# Patient Record
Sex: MALE | Race: WHITE | NOT HISPANIC OR LATINO | Employment: FULL TIME | URBAN - METROPOLITAN AREA
[De-identification: names, ages, dates, MRNs, and addresses within clinical notes are randomized per-mention and may not be internally consistent; named-entity substitution may affect disease eponyms.]

---

## 2017-01-01 PROBLEM — K64.4 EXTERNAL HEMORRHOIDS: Status: ACTIVE | Noted: 2017-01-01

## 2017-01-01 PROBLEM — K57.30 DIVERTICULOSIS OF LARGE INTESTINE: Status: ACTIVE | Noted: 2017-01-01

## 2017-01-09 ENCOUNTER — OFFICE VISIT (OUTPATIENT)
Dept: SURGERY | Facility: CLINIC | Age: 37
End: 2017-01-09
Payer: COMMERCIAL

## 2017-01-09 ENCOUNTER — TELEPHONE (OUTPATIENT)
Dept: SURGERY | Facility: CLINIC | Age: 37
End: 2017-01-09

## 2017-01-09 VITALS — HEIGHT: 71 IN | WEIGHT: 315 LBS | BODY MASS INDEX: 44.1 KG/M2 | TEMPERATURE: 97.7 F

## 2017-01-09 DIAGNOSIS — E66.01 MORBID OBESITY WITH BMI OF 50.0-59.9, ADULT (H): ICD-10-CM

## 2017-01-09 DIAGNOSIS — K82.8 BILIARY DYSKINESIA: Primary | ICD-10-CM

## 2017-01-09 DIAGNOSIS — G89.18 POST-OP PAIN: ICD-10-CM

## 2017-01-09 PROCEDURE — 99243 OFF/OP CNSLTJ NEW/EST LOW 30: CPT | Performed by: SURGERY

## 2017-01-09 RX ORDER — CEFAZOLIN SODIUM 1 G/3ML
1 INJECTION, POWDER, FOR SOLUTION INTRAMUSCULAR; INTRAVENOUS SEE ADMIN INSTRUCTIONS
Status: CANCELLED | OUTPATIENT
Start: 2017-01-09

## 2017-01-09 RX ORDER — CEFAZOLIN SODIUM 1 G/50ML
3 SOLUTION INTRAVENOUS
Status: CANCELLED | OUTPATIENT
Start: 2017-01-09

## 2017-01-09 NOTE — MR AVS SNAPSHOT
After Visit Summary   1/9/2017    Frankie Gomez    MRN: [id number]           Patient Information     Date Of Birth          1980        Visit Information        Provider Department      1/9/2017 9:00 AM Maria Elena Reynoso MD [facility]        Care Instructions    You should stay on a low-fat diet prior to surgery. This means of avoiding all high fat foods.    You can eat things like cereal with low-fat milk. Avoid ice cream but you may have sherbet.  You can eat chicken and turkey without the skin. Extra lean hamburger is okay particular if it is rinsed after cooking. No high fat or marbled meats like ribeye.  You can have pasta with tomato sauce but no cheese or Gideon, etc.  Avoid peanut butter as this is oil based.  Salads are okay but you must use fat-free salad dressing.  Fruits and vegetables are great. Avoid dipping sauces.            Follow-ups after your visit        Who to contact     If you have questions or need follow up information about today's clinic visit or your schedule please contact [facility] directly at [phone].  Normal or non-critical lab and imaging results will be communicated to you by [employer], letter or phone within 4 business days after the clinic has received the results. If you do not hear from us within 7 days, please contact the clinic through [employer] or phone. If you have a critical or abnormal lab result, we will notify you by phone as soon as possible.  Submit refill requests through [employer] or call your pharmacy and they will forward the refill request to us. Please allow 3 business days for your refill to be completed.          Additional Information About Your Visit        MyChart Information     [employer] gives you secure access to your electronic health record. If you see a primary care provider, you can also send messages to your care team and make appointments. If you have questions, please call your primary  "care clinic.  If you do not have a primary care provider, please call [phone] and they will assist you.        Care EveryWhere ID     This is your Care EveryWhere ID. This could be used by other organizations to access your [facility] medical records  [id number]        Your Vitals Were     Temperature Height BMI (Body Mass Index)             97.7  F (36.5  C) (Skin) 5' 11\" (1.803 m) 53.58 kg/m2          Blood Pressure from Last 3 Encounters:   12/30/16 143/94   11/07/16 124/90   09/11/16 153/108    Weight from Last 3 Encounters:   01/09/17 384 lb (174.181 kg)   12/30/16 379 lb (171.913 kg)   11/16/16 375 lb (170.099 kg)              Today, you had the following     No orders found for display       Primary Care Provider Office Phone # Fax #    Erika Brian [phone]       [place] [id number]        Thank you!     Thank you for choosing [facility]  for your care. Our goal is always to provide you with excellent care. Hearing back from our patients is one way we can continue to improve our services. Please take a few minutes to complete the written survey that you may receive in the mail after your visit with us. Thank you!             Your Updated Medication List - Protect others around you: Learn how to safely use, store and throw away your medicines at www.disposemymeds.org.          This list is accurate as of: 1/9/17  9:15 AM.  Always use your most recent med list.                   Brand Name Dispense Instructions for use    IBUPROFEN PO      Take 600 mg by mouth daily       lisinopril-hydrochlorothiazide 10-12.5 MG per tablet    PRINZIDE/ZESTORETIC    90 tablet    Take 1 tablet by mouth daily       omeprazole 40 MG capsule    priLOSEC     Take 40 mg by mouth as needed       order for DME     1 Device    Auto CPAP @@ 5-15 cm with heated humidifier via nasal mask.       TYLENOL PO      Take 1,000 mg by mouth daily         "

## 2017-01-09 NOTE — NURSING NOTE
"Chief Complaint   Patient presents with     Consult     RUQ pain       Initial Temp(Src) 97.7  F (36.5  C) (Skin)  Ht 5' 11\" (1.803 m)  Wt 384 lb (174.181 kg)  BMI 53.58 kg/m2 Estimated body mass index is 53.58 kg/(m^2) as calculated from the following:    Height as of this encounter: 5' 11\" (1.803 m).    Weight as of this encounter: 384 lb (174.181 kg).  BP completed using cuff size: NA (Not Taken)  Norman DODGE CMA      "

## 2017-01-09 NOTE — PATIENT INSTRUCTIONS
You should stay on a low-fat diet prior to surgery. This means of avoiding all high fat foods.    You can eat things like cereal with low-fat milk. Avoid ice cream but you may have sherbet.  You can eat chicken and turkey without the skin. Extra lean hamburger is okay particular if it is rinsed after cooking. No high fat or marbled meats like ribeye.  You can have pasta with tomato sauce but no cheese or Gideon, etc.  Avoid peanut butter as this is oil based.  Salads are okay but you must use fat-free salad dressing.  Fruits and vegetables are great. Avoid dipping sauces.

## 2017-01-09 NOTE — PROGRESS NOTES
[facility] Surgery Consultation    Frankie Gomez MRN# [id number]   Age: 36 year old YOB: 1980     Date of consultation: 1/9/2017    Reason for consult: Biliary dyskinesia       Requesting physician: Erika Brian                       Chief Complaint:   Abdominal pain, right upper quadrant     History is obtained from the patient and review of the chart         History of Present Illness:   This patient is a 36 year old morbidly obese male with past medical history as noted below, who presents with complaints of right upper quadrant abdominal pain after eating fatty foods such as pizza.Patient was noted have distended gallbladder on ultrasound. CT showed no other cause for his pain. Colonoscopy and EGD did not show etiology of his pain. He complains of early satiety with fatty food. He complains of right upper quadrant abdominal pain with fatty food. HIDA scan was abnormal with diminished ejection fraction. As best as he can recall, he says he CCK injection did cause mild symptoms.              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     Former smoker              Past Surgical History:     Past Surgical History   Procedure Laterality Date     Hc colonoscopy w snare removal tumor/polyp/lesion  1998     Appendectomy  07/25/09     Excise mass foot Right 2/13/2015     Procedure: EXCISE MASS FOOT;  Surgeon: Ramo Decker DPM;  Location: [facility]     Colonoscopy N/A 12/30/2016     Procedure: COMBINED  COLONOSCOPY, SINGLE OR MULTIPLE BIOPSY/POLYPECTOMY BY BIOPSY;  Surgeon: Rikki Jenkins MD;  Location: [facility] GI     Esophagoscopy, gastroscopy, duodenoscopy (egd), combined N/A 12/30/2016     Procedure: COMBINED ESOPHAGOSCOPY, GASTROSCOPY, DUODENOSCOPY (EGD), BIOPSY SINGLE OR MULTIPLE;  Surgeon: Rikki Jenkins MD;  Location:  GI             Social History:     Social History   Substance Use Topics     Smoking status: Former Smoker -- 0.75 packs/day for 13 years     Types: Cigarettes     Quit date: 04/15/2013     Smokeless tobacco: Never Used      Comment: using electric cigarette     Alcohol Use: Yes      Comment: 3-4 times/year             Family History:     Family History   Problem Relation Age of Onset     CANCER Paternal Grandmother      Connective Tissue Disorder Mother      sjogrens     Depression Mother      on meds     Neurologic Disorder Mother      restless leg, migraines     Thyroid Disease Mother      C.A.D. Mother      Obesity Mother      Az-en-Y, snores     Obesity Father      snores     Obesity Paternal Grandfather              Immunizations:     VACCINE/DOSE   Diptheria   DPT   DTAP   HBIG   Hepatitis A   Hepatitis B   HIB   Influenza   Measles   Meningococcal   MMR   Mumps   Pneumococcal   Polio   Rubella   Small Pox   TDAP   Varicella   Zoster             Allergies:     Allergies   Allergen Reactions     No Known Allergies              Medications:     Current Outpatient Prescriptions   Medication Sig     IBUPROFEN PO Take 600 mg by mouth daily     omeprazole (PRILOSEC) 40 MG capsule Take 40 mg by mouth as needed     order for DME Auto CPAP @@ 5-15 cm with heated humidifier via nasal mask.     lisinopril-hydrochlorothiazide (PRINZIDE,ZESTORETIC) 10-12.5 MG per tablet Take 1 tablet by mouth daily     [DISCONTINUED] hydrochlorothiazide 12.5 MG TABS Take 1 tablet (12.5 mg) by mouth daily     Acetaminophen (TYLENOL PO) Take 1,000 mg by mouth daily      No current facility-administered  "medications for this visit.             Review of Systems:   C: NEGATIVE for fever, chills, change in weight  E/M: NEGATIVE for ear, mouth and throat problems  R: NEGATIVE for significant cough or SOB  CV: NEGATIVE for chest pain, palpitations or peripheral edema  GI: positive for nausea abdominal pain and loose stool. Worsened by fatty food.          Physical Exam:   All vitals have been reviewed  Temp(Src) 97.7  F (36.5  C) (Skin)  Ht 5' 11\" (1.803 m)  Wt 384 lb (174.181 kg)  BMI 53.58 kg/m2  Body mass index is 53.58 kg/(m^2).        General: Alert and oriented in no obvious distress  Eyes: No scleral icterus  Skin: Warm and dry without diaphoresis or jaundice  Respiratory: Unlabored  Abdomen: Obese, soft without tenderness in right upper quadrant at present. There is no rebound rigidity or guarding.  Musculoskeletal: Patient noted to move all extremities normally  Psychiatric: No obvious changes in mood or affect               Data:   All laboratory data reviewed  Results for orders placed or performed during the hospital encounter of 12/30/16   UPPER GI ENDOSCOPY   Result Value Ref Range    [place] [address] [phone]     Endoscopy Department  _______________________________________________________________________________  Patient Name: Frankie Gomez           Procedure Date: 12/30/2016 2:23 PM  MRN: [id number]                       Account Number: [id number]  YOB: 1980             Admit Type: Outpatient  Age: 36                               Room: Room 1  Gender: Male                          Note Status: Finalized  Attending MD: Rikki Jenkins MD    _______________________________________________________________________________     Procedure:           Upper GI endoscopy  Indications:         Epigastric abdominal pain, Diarrhea  Providers:           Rikki Jenkins MD  Referring MD:        Erika ZALDIVAR" MD Snehal, Troy Drummond MD  Medicines:           Midazolam 7 mg IV, Fentanyl 125 micrograms IV, Lidocaine                        gel  Complications:       No immediate complications.  _______ ________________________________________________________________________  Procedure:           Pre-Anesthesia Assessment:                       - Prior to the procedure, a History and Physical was                        performed, and patient medications, allergies and                        sensitivities were reviewed. The patient's tolerance of                        previous anesthesia was reviewed.                       - ASA Grade Assessment: II - A patient with mild                        systemic disease.                       After obtaining informed consent, the endoscope was                        passed under direct vision. Throughout the procedure,                        the patient's blood pressure, pulse, and oxygen                        saturations were monitored continuously. The Endoscope                        was introduced through the mouth, and advanced to the                        third part of duodenum.                                                                                    Findings:       The esophagus was normal.       The stomach was normal.       The examined duodenum was normal. Biopsies were taken with a cold        forceps for histology.                                                                                   Impression:          - Normal esophagus.                       - Normal stomach.                       - Normal examined duodenum.  Recommendation:      - Await pathology results.                       - Perform a colonoscopy today.                                                                                     ______________________  Rikki Jenkins MD  12/30/2016 2:45 PM  This report has been signed electronically.  Number of Addenda: 0    Note Initiated On:  12/30/2016 2:23 PM     COLONOSCOPY   Result Value Ref Range    COLONOSCOPY       [place] [address] [facility] [place] [address] [phone]     Endoscopy Department  _______________________________________________________________________________  Patient Name: Frankie Gomez           Procedure Date: 12/30/2016 2:45 PM  MRN: [id number]                       Account Number: [id number]  YOB: 1980             Admit Type: Outpatient  Age: 36                               Room: Room 1  Gender: Male                          Note Status: Finalized  Attending MD: Rikki Jenkins MD    _______________________________________________________________________________     Procedure:           Colonoscopy  Indications:         Abdominal pain, Diarrhea  Providers:           Rikki Jenkins MD  Referring MD:        Erika Brian MD, Troy Drummond MD  Medicines:           Midazolam 4 mg IV, Fentanyl 25 micrograms IV, See the                        other procedure note for documentation of the                        administered m edications  Complications:       No immediate complications.  _______________________________________________________________________________  Procedure:           Pre-Anesthesia Assessment:                       - Prior to the procedure, a History and Physical was                        performed, and patient medications, allergies and                        sensitivities were reviewed. The patient's tolerance of                        previous anesthesia was reviewed.                       - ASA Grade Assessment: II - A patient with mild                        systemic disease.                       After obtaining informed consent, the colonoscope was                        passed under direct vision. Throughout the procedure,                        the patient's blood pressure, pulse, and oxygen                        saturations were monitored  continuously. The Colonoscope                        was introduced through the anus and advanced to the                        terminal il eum. The quality of the bowel preparation was                        excellent.                                                                                   Findings:       The terminal ileum appeared normal.       A few small and large-mouthed diverticula were found in the sigmoid        colon. The colon was otherwise normal. Biopsies were taken with a cold        forceps for histology.       Medium-sized external hemorrhoids were noted.                                                                                   Impression:          - Diverticulosis in the sigmoid colon.                       - External hemorrhoids, medium.  Recommendation:      - Await pathology results.                       - Advise propofol for future sedated exams.                       - Patient has a contact number available for                        emergencies. The signs and symptoms of potential delayed                        complications were discussed with the patient. Return to                         normal activities tomorrow. Written discharge                        instructions were provided to the patient.                                                                                     ______________________  Sia Jenkins MD  12/30/2016 3:00 PM  This report has been signed electronically.  Number of Addenda: 0    Note Initiated On: 12/30/2016 2:45 PM     Surgical pathology exam   Result Value Ref Range    Copath Report       Patient Name: ROSEMARIE CARLSON  MR#: [id number]  Specimen #: [id number]  Collected: 12/30/2016  Received: 1/2/2017  Reported: 1/3/2017 15:26  Ordering Phy(s): SIA JENKINS    For improved result formatting, select 'View Enhanced Report Format'  under Linked Documents section.    SPECIMEN(S):  A: Duodenal biopsy  B: Colon biopsy, random    FINAL  "DIAGNOSIS:   And A.  Duodenum, mucosal biopsy:  - Normal small bowel mucosa.    B.  Colon, right mucosal biopsies:  - Normal colonic mucosa.    Electronically signed out by:    Eyal Araujo M.D.    CLINICAL HISTORY:  36-year-old male.  From electronic medical record:  Epigastric abdominal  pain, diarrhea.  On 12/30/60 upper GI endoscopy reported as normal  esophagus, normal stomach and normal examined and duodenum.    GROSS:  A. The specimen is received in formalin, labeled with the patient's name  and date of birth, and designated \"duodenal biopsies\". It consists of  six tan tissue fragments ranging from 0.1-0.2 cm in greatest dimension.   Entirely submitted in one cassette.    B. The specimen is received in formalin, labeled with the patient's name  and date of birth, and designated \"random colon biopsies\". It consists  of six tan tissue fragments ranging from 0.1-0.2 cm in greatest  dimension.  One specimen fragment is inked blue as an identification  marker.  Entirely submitted in one cassette. (Dictated by: Bridgett SINCLAIR 1/2/2017 09:11 AM)    MICROSCOPIC:  Microscopic examination is performed.    CPT Codes:  A: 00448-QC8  B: 75304-NS1    TESTING LAB LOCATION:  [address] [address] [phone]    COLLECTION SITE:  Client: [facility]  Location: [facility] (P)            ULTRASOUND ABDOMEN LIMITED RIGHT UPPER QUADRANT  9/12/2016 12:57 AM       HISTORY: Right upper quadrant abdominal pain.     COMPARISON: None.     FINDINGS: Markedly limited visualization of the liver due to large  body habitus. The gallbladder is mildly distended but otherwise  unremarkable without visualized gallstones, wall thickening or  pericholecystic fluid. No focal tenderness over the gallbladder. The  common duct is mildly dilated measuring 1.0 cm in diameter. The  intrahepatic bile ducts are not well-visualized. The right kidney " has  normal size and echogenicity measuring 11.4 cm in length. No right  intrarenal collecting system dilatation, calculi or masses. No free  fluid in the upper right hemiabdomen.                                                                       IMPRESSION:    1. The gallbladder is mildly distended, but is otherwise unremarkable  in appearance.  2. Mild dilatation of the common bile duct, measuring 1.0 cm in  diameter.  3. Markedly limited visualization of the liver due to large body  habitus.        CT scan dated 9/12/16  shows:    IMPRESSION:    1. No convincing cause of acute pain identified in the abdomen or  pelvis.  2. No renal or ureteral calculi or evidence of urinary obstruction.  3. A few colonic diverticula are present without evidence of  diverticulitis.  4. The gallbladder is mildly distended, but otherwise unremarkable.     NUCLEAR MEDICINE HEPATOBILIARY SCAN WITH GALLBLADDER EJECTION FRACTION  11/16/2016 2:05 PM       HISTORY: Right upper quadrant pain.     TECHNIQUE: 7.5 mCi technetium labeled mebrofenin given intravenously.  3.4 mcg CCK given intravenously at 60 minutes.     COMPARISON: None.     FINDINGS: There is prompt and homogeneous uptake of the  radiopharmaceutical by the liver. Intrahepatic and faint extrahepatic  bile duct visualization is seen by 15 minutes and small bowel is  identified by 20 minutes. The gallbladder begins to visualize at 30  minutes and fills progressively to 60 minutes. Computer-calculated  gallbladder ejection fraction is 11% which is abnormally low (normal  35-75%).                                                                       IMPRESSION:  1. No evidence for cystic duct or common duct obstruction.  2. Delayed visualization of the gallbladder which may indicate chronic  cholecystitis.  3. Abnormally low gallbladder ejection fraction at 11%.          Assessment and Plan:   Assessment:  biliary dyskinesia, morbid obesity     Patient Active Problem List     Diagnosis Date Noted     Diverticulosis of large intestine 01/01/2017     Priority: Medium     External hemorrhoids 01/01/2017     Priority: Medium     SVT (supraventricular tachycardia) (H) 05/27/2015     Priority: Medium     NASIR (obstructive sleep apnea) 12/15/2014     [place] 11/20/2014 AHI 5.2 RDI 11.8        Heartburn      Morbid obesity (H)      Hypertension      Hyperlipidemia      Diagnosis updated by automated process. Provider to review and confirm.       Asthma      [employer] utility       Fatty liver disease, nonalcoholic      Generalized anxiety disorder      Diagnosis updated by automated process. Provider to review and confirm.       Depression      Panic      Chronic low back pain      fall injury, fell through stairs-hit concrete       Lumbosacral neuritis 01/28/2013     CARDIOVASCULAR SCREENING; LDL GOAL LESS THAN 130 02/02/2011     Major depressive disorder, single episode, moderate (H) 02/02/2011     Displacement of intervertebral disc, site unspecified, without myelopathy 02/12/2007     Tobacco use disorder 11/21/2006     Pain in joint, forearm 06/25/2006     Degeneration of lumbar or lumbosacral intervertebral disc 08/10/2005           Plan:   The patient was thoroughly counseled regarding his biliary dyskinesia. Low-fat diet was recommended. Laparoscopic cholecystectomy was discussed.    The patient was informed that the proposed procedure or medical intervention involves removal of the gallbladder laparoscopically (with small incisions and camera) possibly open and does offer a very good likelihood of symptom relief.     The patient was made aware of the risks of the procedure, including but not limited to:    bleeding, conversion to open, bile leak, bile duct injury or other adjacent organ injury, retained gallstones, cardiac or pulmonary related complications and anesthesia complications. Also that difficulties may be encountered during recovery to include:  bile leak or retained stone  requiring ERCP.     In the course of the evaluation we did discuss other therapeutic options with the patient, including antibiotics and/or low fat diet. The risks and benefits of these options were also discussed which include but are not limited to: recurrent worsening episodes.     Also discussed were possible problems or difficulties the patient may encounter if treatment was not pursued at this time. These include: worsening episodes, cholangitis and/or pancreatitis.     The patient was informed that Maria Elena Reynoso MD will be primarily responsible for the procedure. Assistance during the procedure and during hospitalization may also be provided by other physicians, nurses and technicians.       The patient will be provided additional education resources by the support staff. If there are ever any questions regarding their diagnosis or the procedure, the patient is encouraged to ask.     All of the patient s  questions have been answered to his satisfaction and he has indicated a clear understanding of this discussion.   Frankie expressed understanding of risks, benefits and alternatives and wished to proceed.     All findings, test results, and diagnosis were discussed with the patient. Frankie  participated in the decision making process and agreed with the plan of care. Questions were sought and answered.                Attestation:  I have reviewed today's vital signs, notes, medications, labs and imaging.  Amount of time performed on this consult: 30 minutes of which greater than 50% was spent in counseling and coordination of care.    Maria Elena Reynoso MD       Please schedule for surgery, pre op H&P, and post ops.    Surgery:  Patient Name:  Frankie Gomez ([id number])  Procedure:   Laparoscopic cholecystectomy  Diagnosis:    Biliary dyskinesia  Morbid obesity   Assistant: first assist  Surgeon:  Maria Elena Reynoso MD  Anesthesia:  General  PT type:  Same Day Surgery  Time needed: 75  minutes  Patient position:  lying supine  Mini fluoro:  No  C-arm:  no  Equipment:  routine  Anticoagulation:  No  Vendor:  no  Surgeon Notes: morbid obesity    Post op appts:    12-15 days po    Expected work restrictions:  10 pound lifting,pushing and pulling restriction for 3 weeks postop    [facility] Discussed:  NO

## 2017-01-09 NOTE — TELEPHONE ENCOUNTER
Surgery Scheduled    Date of Surgery 2/13 Time of Surgery 200pm  Procedure: Laparoscopic cholecystectomy  Hospital/Surgical Facility: [place] [facility]  Surgeon: Dr. Reynoso  Type of Anesthesia : General  Pre-op 1/30 with Dr. Brian  2 week post op:2/27 with Dr. reynoso        Surgery packet given to patient in clinic. Patients instructed to arrive 1 hours prior to surgery. Patient understood and agrees to plan.    Nancy Han  Surgical Scheduler

## 2017-01-24 NOTE — PROGRESS NOTES
[address]  [facility] [address]  [phone]  Dept: [phone]    PRE-OP EVALUATION:  Today's date: 2017    Frankie Gomez (: 1980) presents for pre-operative evaluation assessment as requested by Dr. Reynoso.  He requires evaluation and anesthesia risk assessment prior to undergoing surgery/procedure for treatment of gallbladder .  Proposed procedure: LAPAROSCOPIC CHOLECYSTECTOMY    Date of Surgery/ Procedure: 17   Time of Surgery/ Procedure: 2:00pm  Hospital/Surgical Facility:  [facility]    Primary Physician: Erika Brian  Type of Anesthesia Anticipated: General    Patient has a Health Care Directive or Living Will:  NO    1. YES - DO YOU HAVE A HISTORY OF HEART ATTACK, STROKE, STENT, BYPASS OR SURGERY ON AN ARTERY IN THE HEAD, NECK, HEART OR LEG? cathetor ablation for SVT  2. NO - Do you ever have any pain or discomfort in your chest?  3. NO - Do you have a history of  Heart Failure?  4. YES - Are you troubled by shortness of breath when: walking on the level, up a slight hill or at night?  5. YES - Do you currently have a cold, bronchitis or other respiratory infection?  6. YES - Do you have a cough, shortness of breath or wheezing?  7. NO - Do you sometimes get pains in the calves of your legs when you walk?  8. NO - Do you or anyone in your family have previous history of blood clots?  9. NO - Do you or does anyone in your family have a serious bleeding problem such as prolonged bleeding following surgeries or cuts?  10. NO - Have you ever had problems with anemia or been told to take iron pills?  11. NO - Have you had any abnormal blood loss such as black, tarry or bloody stools, or abnormal vaginal bleeding?  12. NO - Have you ever had a blood transfusion?  13. NO - Have you or any of your relatives ever had problems with anesthesia?  14. YES - Do you have sleep apnea, excessive snoring or daytime drowsiness?  15. NO - Do you  have any prosthetic heart valves?  16. NO - Do you have prosthetic joints?  17. NO - Is there any chance that you may be pregnant?      HPI:                                                      Brief HPI related to upcoming procedure: recurrent ab pain -- gallbladder thought to be source      See problem list for active medical problems.  Problems all longstanding and stable, except as noted/documented.  See ROS for pertinent symptoms related to these conditions.                                                                                                  .    MEDICAL HISTORY:                                                      Patient Active Problem List    Diagnosis Date Noted     Diverticulosis of large intestine 01/01/2017     Priority: Medium     External hemorrhoids 01/01/2017     Priority: Medium     SVT (supraventricular tachycardia) (H) 05/27/2015     Priority: Medium     NASIR (obstructive sleep apnea) 12/15/2014     Priority: Medium     [place]ce 11/20/2014 AHI 5.2 RDI 11.8        Heartburn      Priority: Medium     Morbid obesity (H)      Priority: Medium     Hypertension      Priority: Medium     Hyperlipidemia      Priority: Medium     Diagnosis updated by automated process. Provider to review and confirm.       Asthma      Priority: Medium     Imo Update utility       Fatty liver disease, nonalcoholic      Priority: Medium     Generalized anxiety disorder      Priority: Medium     Diagnosis updated by automated process. Provider to review and confirm.       Panic      Priority: Medium     Chronic low back pain      Priority: Medium     fall injury, fell through stairs-hit concrete       Lumbosacral neuritis 01/28/2013     Priority: Medium     CARDIOVASCULAR SCREENING; LDL GOAL LESS THAN 130 02/02/2011     Priority: Medium     Major depressive disorder, single episode, moderate (H) 02/02/2011     Priority: Medium     Displacement of intervertebral disc, site unspecified, without myelopathy 02/12/2007      Priority: Medium     Tobacco use disorder 11/21/2006     Priority: Medium     Pain in joint, forearm 06/25/2006     Priority: Medium     Degeneration of lumbar or lumbosacral intervertebral disc 08/10/2005     Priority: Medium      Past Medical History   Diagnosis Date     Morbid obesity (H)      Unspecified sinusitis (chronic)      Chronic low back pain      fall injury, fell through stairs-hit concrete     Hypertension      Hyperlipaemia      Diverticulitis      OA (osteoarthritis)      Fatty liver disease, nonalcoholic      Depression      MARISOL (generalised anxiety disorder)      Panic      NASIR (obstructive sleep apnea) 11/20/2014     AHI 5.2     SVT (supraventricular tachycardia) (H)      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     Former smoker      Past Surgical History   Procedure Laterality Date     Hc colonoscopy w snare removal tumor/polyp/lesion  1998     Appendectomy  07/25/09     Excise mass foot Right 2/13/2015     Procedure: EXCISE MASS FOOT;  Surgeon: Ramo Decker DPM;  Location: [facility] OR     Colonoscopy N/A 12/30/2016     Procedure: COMBINED COLONOSCOPY, SINGLE OR MULTIPLE BIOPSY/POLYPECTOMY BY BIOPSY;  Surgeon: Rikki Jenkins MD;  Location:  GI     Esophagoscopy, gastroscopy, duodenoscopy (egd), combined N/A 12/30/2016     Procedure: COMBINED ESOPHAGOSCOPY, GASTROSCOPY, DUODENOSCOPY (EGD), BIOPSY SINGLE OR MULTIPLE;  Surgeon: Rikki Jenkins MD;  Location: [facility] GI     Current Outpatient Prescriptions   Medication Sig Dispense Refill     IBUPROFEN PO Take 600 mg by mouth daily       order for DME Auto CPAP @@ 5-15 cm with heated humidifier via nasal mask. 1 Device 0     lisinopril-hydrochlorothiazide (PRINZIDE,ZESTORETIC) 10-12.5 MG per tablet Take 1 tablet by mouth daily 90 tablet 1     Acetaminophen (TYLENOL PO) Take 1,000 mg by mouth daily        omeprazole (PRILOSEC) 40 MG capsule Take 40 mg by mouth as needed       [DISCONTINUED] hydrochlorothiazide 12.5 MG TABS Take 1 tablet (12.5 mg) by  "mouth daily 90 tablet 3     OTC products: NSAIDS    Allergies   Allergen Reactions     No Known Allergies       Latex Allergy: NO    Social History   Substance Use Topics     Smoking status: Former Smoker -- 0.75 packs/day for 13 years     Types: Cigarettes     Quit date: 04/15/2013     Smokeless tobacco: Never Used      Comment: using electric cigarette     Alcohol Use: No     History   Drug Use     Yes     Special: Marijuana     Comment: marijuana occasional use       REVIEW OF SYSTEMS:                                                    Congestion noted today, improving.  A little cough but mucinex improved    Constitutional, neuro, ENT, endocrine, pulmonary, cardiac, gastrointestinal, genitourinary, musculoskeletal, integument and psychiatric systems are negative, except as otherwise noted.    EXAM:                                                    /80 mmHg  Pulse 80  Temp(Src) 98.6  F (37  C) (Temporal)  Resp 18  Ht 5' 11\" (1.803 m)  Wt 376 lb (170.552 kg)  BMI 52.46 kg/m2  SpO2 98%    GENERAL APPEARANCE: healthy, alert and no distress     EYES: EOMI, - PERRL     HENT: ear canals and TM's normal and nose and mouth without ulcers or lesions     NECK: no adenopathy, no asymmetry, masses, or scars and thyroid normal to palpation     RESP: lungs clear to auscultation - no rales, rhonchi or wheezes     BREAST: normal without masses, tenderness or nipple discharge and no palpable axillary masses or adenopathy     CV: regular rates and rhythm, normal S1 S2, no S3 or S4 and no murmur, click or rub -     ABDOMEN:  soft, nontender, no HSM or masses and bowel sounds normal     Rectal: Prostate symmetric w/o nodularity, no masses palpated     MS: extremities normal- no gross deformities noted, no evidence of inflammation in joints, FROM in all extremities.     SKIN: no suspicious lesions or rashes  Rectal:  Normal small prostate     NEURO: Normal strength and tone, sensory exam grossly normal, mentation intact " and speech normal     PSYCH: mentation appears normal. and affect normal/bright     LYMPHATICS: No axillary, cervical, inguinal, or supraclavicular nodes    DIAGNOSTICS:                                                    EKG: Not indicated due to non-vascular surgery and low risk of event (age <65 and without cardiac risk factors)    Recent Labs   Lab Test  12/15/16   1134  11/07/16   1706  09/11/16   2340   06/01/16   1745   08/12/13   1121   HGB  15.5  14.7  13.8   < >  14.2   < >  14.5   PLT  268  269  311   < >  276   < >  252   NA   --   137  140   < >  140   < >  143   POTASSIUM   --   3.7  3.4   < >  3.6   < >  3.9   CR  0.85  0.82  0.94   < >  0.75   < >  0.68   A1C   --    --    --    --   5.5   --   5.2    < > = values in this interval not displayed.        IMPRESSION:                                                    Reason for surgery/procedure: gallbladder   Diagnosis/reason for consult: asthma, depression, anxiety, morbid obesity, NASIR    The proposed surgical procedure is considered INTERMEDIATE risk.    REVISED CARDIAC RISK INDEX  The patient has the following serious cardiovascular risks for perioperative complications such as (MI, PE, VFib and 3  AV Block):  No serious cardiac risks  INTERPRETATION: 0 risks: Class I (very low risk - 0.4% complication rate)    The patient has the following additional risks for perioperative complications:  No identified additional risks      ICD-10-CM    1. Preop general physical exam Z01.818    2. Need for prophylactic vaccination and inoculation against influenza Z23    3. NASIR (obstructive sleep apnea) G47.33    4. Generalized anxiety disorder F41.1    5. Major depressive disorder, single episode, moderate (H) F32.1        RECOMMENDATIONS:                                                        Obstructive Sleep Apnea (or suspected sleep apnea)  Patient is to bring their home CPAP with them on the day of surgery        Anticoagulant or Antiplatelet Medication  Use  NSAIDS: Ibuprofen (Motrin):         Stop 7 day prior to surgery        ACE Inhibitor or Angiotensin Receptor Blocker (ARB) Use  Ace inhibitor or Angiotensin Receptor Blocker (ARB) and should HOLD this medication for the 24 hours prior to surgery.      APPROVAL GIVEN to proceed with proposed procedure, without further diagnostic evaluation       Patient has BPH like symptoms -- scoring 25 on the AUA scale, though most symptom descriptions sound to be muscular relaxtion issues.  Rectal done today with very tight sphincter and discomfort with exam suggestive of this as well.  Can do trial of flomax, but advised that therapy may be more helpful.  He is not sure about that.  Seems set on prostate is the issue.    Signed Electronically by: Erika Brian MD, MD    Copy of this evaluation report is provided to requesting physician.    [facility] Preop Guidelines  HPI      ROS      Physical Exam

## 2017-01-24 NOTE — PATIENT INSTRUCTIONS
Before Your Surgery      Call your surgeon if there is any change in your health. This includes signs of a cold or flu (such as a sore throat, runny nose, cough, rash or fever).    Do not smoke, drink alcohol or take over the counter medicine (unless your surgeon or primary care doctor tells you to) for the 24 hours before and after surgery.    If you take prescribed drugs: Follow your doctor s orders about which medicines to take and which to stop until after surgery.    Eating and drinking prior to surgery: follow the instructions from your surgeon    Take a shower or bath the night before surgery. Use the soap your surgeon gave you to gently clean your skin. If you do not have soap from your surgeon, use your regular soap. Do not shave or scrub the surgery site.  Wear clean pajamas and have clean sheets on your bed.     DO not take prinzide on the day of surgery (or 24 hours prior to surgery)  STOP ibuprofen for 7 days prior (or per surgeon instructions)

## 2017-01-30 ENCOUNTER — OFFICE VISIT (OUTPATIENT)
Dept: FAMILY MEDICINE | Facility: OTHER | Age: 37
End: 2017-01-30
Payer: COMMERCIAL

## 2017-01-30 VITALS
SYSTOLIC BLOOD PRESSURE: 112 MMHG | RESPIRATION RATE: 18 BRPM | DIASTOLIC BLOOD PRESSURE: 80 MMHG | HEIGHT: 71 IN | OXYGEN SATURATION: 98 % | WEIGHT: 315 LBS | TEMPERATURE: 98.6 F | BODY MASS INDEX: 44.1 KG/M2 | HEART RATE: 80 BPM

## 2017-01-30 DIAGNOSIS — E78.5 HYPERLIPIDEMIA, UNSPECIFIED HYPERLIPIDEMIA TYPE: Primary | ICD-10-CM

## 2017-01-30 DIAGNOSIS — N40.1 BENIGN PROSTATIC HYPERPLASIA WITH LOWER URINARY TRACT SYMPTOMS, UNSPECIFIED MORPHOLOGY: ICD-10-CM

## 2017-01-30 DIAGNOSIS — E66.01 MORBID OBESITY DUE TO EXCESS CALORIES (H): ICD-10-CM

## 2017-01-30 DIAGNOSIS — F41.1 GENERALIZED ANXIETY DISORDER: ICD-10-CM

## 2017-01-30 DIAGNOSIS — Z23 NEED FOR PROPHYLACTIC VACCINATION AND INOCULATION AGAINST INFLUENZA: ICD-10-CM

## 2017-01-30 DIAGNOSIS — K64.4 EXTERNAL HEMORRHOIDS: ICD-10-CM

## 2017-01-30 DIAGNOSIS — Z13.6 CARDIOVASCULAR SCREENING; LDL GOAL LESS THAN 130: ICD-10-CM

## 2017-01-30 DIAGNOSIS — Z01.818 PREOP GENERAL PHYSICAL EXAM: Primary | ICD-10-CM

## 2017-01-30 DIAGNOSIS — F32.1 MAJOR DEPRESSIVE DISORDER, SINGLE EPISODE, MODERATE (H): ICD-10-CM

## 2017-01-30 DIAGNOSIS — G47.33 OSA (OBSTRUCTIVE SLEEP APNEA): ICD-10-CM

## 2017-01-30 LAB
CHOLEST SERPL-MCNC: 249 MG/DL
HDLC SERPL-MCNC: 35 MG/DL
LDLC SERPL CALC-MCNC: 177 MG/DL
NONHDLC SERPL-MCNC: 214 MG/DL
PSA SERPL-ACNC: 0.76 UG/L (ref 0–4)
TRIGL SERPL-MCNC: 187 MG/DL

## 2017-01-30 PROCEDURE — 36415 COLL VENOUS BLD VENIPUNCTURE: CPT | Performed by: FAMILY MEDICINE

## 2017-01-30 PROCEDURE — 80061 LIPID PANEL: CPT | Performed by: FAMILY MEDICINE

## 2017-01-30 PROCEDURE — 99214 OFFICE O/P EST MOD 30 MIN: CPT | Performed by: FAMILY MEDICINE

## 2017-01-30 PROCEDURE — G0103 PSA SCREENING: HCPCS | Performed by: FAMILY MEDICINE

## 2017-01-30 RX ORDER — TAMSULOSIN HYDROCHLORIDE 0.4 MG/1
0.4 CAPSULE ORAL DAILY
Qty: 30 CAPSULE | Refills: 1 | Status: SHIPPED | OUTPATIENT
Start: 2017-01-30 | End: 2018-02-18

## 2017-01-30 ASSESSMENT — PAIN SCALES - GENERAL: PAINLEVEL: NO PAIN (0)

## 2017-01-30 ASSESSMENT — ANXIETY QUESTIONNAIRES
7. FEELING AFRAID AS IF SOMETHING AWFUL MIGHT HAPPEN: 0 = NOT AT ALL
GAD7 TOTAL SCORE: 2
GAD7 TOTAL SCORE: 2

## 2017-01-30 ASSESSMENT — PATIENT HEALTH QUESTIONNAIRE - PHQ9
10. IF YOU CHECKED OFF ANY PROBLEMS, HOW DIFFICULT HAVE THESE PROBLEMS MADE IT FOR YOU TO DO YOUR WORK, TAKE CARE OF THINGS AT HOME, OR GET ALONG WITH OTHER PEOPLE: NOT DIFFICULT AT ALL
SUM OF ALL RESPONSES TO PHQ QUESTIONS 1-9: 5

## 2017-01-30 NOTE — MR AVS SNAPSHOT
After Visit Summary   1/30/2017    Frankie Gomez    MRN: [id number]           Patient Information     Date Of Birth          1980        Visit Information        Provider Department      1/30/2017 8:00 AM Erika Brian MD [facility]        Today's Diagnoses     Preop general physical exam    -  1     Need for prophylactic vaccination and inoculation against influenza         NASIR (obstructive sleep apnea)         Generalized anxiety disorder         Major depressive disorder, single episode, moderate (H)         CARDIOVASCULAR SCREENING; LDL GOAL LESS THAN 130         Morbid obesity due to excess calories (H)         Benign prostatic hyperplasia with lower urinary tract symptoms, unspecified morphology         External hemorrhoids           Care Instructions      Before Your Surgery      Call your surgeon if there is any change in your health. This includes signs of a cold or flu (such as a sore throat, runny nose, cough, rash or fever).    Do not smoke, drink alcohol or take over the counter medicine (unless your surgeon or primary care doctor tells you to) for the 24 hours before and after surgery.    If you take prescribed drugs: Follow your doctor s orders about which medicines to take and which to stop until after surgery.    Eating and drinking prior to surgery: follow the instructions from your surgeon    Take a shower or bath the night before surgery. Use the soap your surgeon gave you to gently clean your skin. If you do not have soap from your surgeon, use your regular soap. Do not shave or scrub the surgery site.  Wear clean pajamas and have clean sheets on your bed.     DO not take prinzide on the day of surgery (or 24 hours prior to surgery)  STOP ibuprofen for 7 days prior (or per surgeon instructions)        Follow-ups after your visit        Your next 10 appointments already scheduled     Feb 13, 2017   Procedure with Maria Elena Reynoso MD   [place]  [facility] ([facility])    [address] [place] [id number] [phone]           From [address]: Exit at "[employer] on south side of [place]. Turn right on [address]. Turn left at stoplight on [address]. [facility] will be in view two blocks ahead            Feb 27, 2017  8:00 AM   Return Visit with Maria Elena Reynoso MD   [facility]    [address]  [place] [id number] [phone]              Who to contact     If you have questions or need follow up information about today's clinic visit or your schedule please contact [facility] directly at [phone].  Normal or non-critical lab and imaging results will be communicated to you by MyChart, letter or phone within 4 business days after the clinic has received the results. If you do not hear from us within 7 days, please contact the clinic through [employer] or phone. If you have a critical or abnormal lab result, we will notify you by phone as soon as possible.  Submit refill requests through [email or web address] or call your pharmacy and they will forward the refill request to us. Please allow 3 business days for your refill to be completed.          Additional Information About Your Visit        [employer] Social Information     [email or web address] gives you secure access to your electronic health record. If you see a primary care provider, you can also send messages to your care team and make appointments. If you have questions, please call your primary care clinic.  If you do not have a primary care provider, please call [phone] and they will assist you.        Care EveryWhere ID     This is your Care EveryWhere ID. This could be used by other organizations to access your [facility] medical records  [id number]        Your Vitals Were     Pulse Temperature Respirations Height BMI (Body Mass Index) Pulse Oximetry    80 98.6  F (37  C) (Temporal)  "18 5' 11\" (1.803 m) 52.46 kg/m2 98%       Blood Pressure from Last 3 Encounters:   01/30/17 112/80   12/30/16 143/94   11/07/16 124/90    Weight from Last 3 Encounters:   01/30/17 376 lb (170.552 kg)   01/09/17 384 lb (174.181 kg)   12/30/16 379 lb (171.913 kg)              We Performed the Following     Asthma Action Plan   (Please complete E-AAP by signing order and opening link in order details)     Lipid panel reflex to direct LDL     PSA, screen          Today's Medication Changes          These changes are accurate as of: 1/30/17  8:46 AM.  If you have any questions, ask your nurse or doctor.               These medicines have changed or have updated prescriptions.        Dose/Directions    * order for DME   This may have changed:  Another medication with the same name was added. Make sure you understand how and when to take each.   Used for:  Sleep apnea, unspecified type   Changed by:  Rosey Low APRN CNP        Auto CPAP @@ 5-15 cm with heated humidifier via nasal mask.   Quantity:  1 Device   Refills:  0       * order for DME   This may have changed:  You were already taking a medication with the same name, and this prescription was added. Make sure you understand how and when to take each.   Used for:  NASIR (obstructive sleep apnea)   Changed by:  Erika Brian MD        Equipment being ordered: cpap supplies -- tubing with humidity and nasal mask   Quantity:  1 Device   Refills:  0       * Notice:  This list has 2 medication(s) that are the same as other medications prescribed for you. Read the directions carefully, and ask your doctor or other care provider to review them with you.         Where to get your medicines      Some of these will need a paper prescription and others can be bought over the counter.  Ask your nurse if you have questions.     Bring a paper prescription for each of these medications    - order for DME             Primary Care Provider Office Phone # Fax #    " Erika Brian -[phone]       [facility]  [address] [address]        Thank you!     Thank you for choosing [facility]  for your care. Our goal is always to provide you with excellent care. Hearing back from our patients is one way we can continue to improve our services. Please take a few minutes to complete the written survey that you may receive in the mail after your visit with us. Thank you!             Your Updated Medication List - Protect others around you: Learn how to safely use, store and throw away your medicines at www.disposemymeds.org.          This list is accurate as of: 1/30/17  8:46 AM.  Always use your most recent med list.                   Brand Name Dispense Instructions for use    IBUPROFEN PO      Take 600 mg by mouth daily       lisinopril-hydrochlorothiazide 10-12.5 MG per tablet    PRINZIDE/ZESTORETIC    90 tablet    Take 1 tablet by mouth daily       omeprazole 40 MG capsule    priLOSEC     Take 40 mg by mouth as needed       * order for DME     1 Device    Auto CPAP @@ 5-15 cm with heated humidifier via nasal mask.       * order for DME     1 Device    Equipment being ordered: cpap supplies -- tubing with humidity and nasal mask       TYLENOL PO      Take 1,000 mg by mouth daily       * Notice:  This list has 2 medication(s) that are the same as other medications prescribed for you. Read the directions carefully, and ask your doctor or other care provider to review them with you.

## 2017-01-30 NOTE — NURSING NOTE
"Chief Complaint   Patient presents with     Pre-Op Exam     Panel Management     flu, lipid, phq9, evelina, aap, act       Initial /80 mmHg  Pulse 80  Temp(Src) 98.6  F (37  C) (Temporal)  Resp 18  Ht 5' 11\" (1.803 m)  Wt 376 lb (170.552 kg)  BMI 52.46 kg/m2  SpO2 98% Estimated body mass index is 52.46 kg/(m^2) as calculated from the following:    Height as of this encounter: 5' 11\" (1.803 m).    Weight as of this encounter: 376 lb (170.552 kg).  BP completed using cuff size: large  "

## 2017-01-30 NOTE — PROGRESS NOTES
Quick Note:    Frankie, your PSA results were normal. The cholesterol has increased over the years and is at your highest yet. We may need to consider meds soon as it has continued to rise. I will have staff call to d/w you.  Please let me know if you have any questions.   Erika Brian MD      ______

## 2017-01-30 NOTE — TELEPHONE ENCOUNTER
Please call and offer cholesterol meds.and let me know where to send meds.  ALso plan recheck in 6-8 weeks fasting and will add blood sugar screen as well.    Frankie, your PSA results were normal.  The cholesterol has increased over the years and is at your highest yet.  We may need to consider meds soon as it has continued to rise. I will have staff call to d/w you.  Please let me know if you have any questions.    Erika Brian MD

## 2017-01-30 NOTE — Clinical Note
My Asthma Action Plan  Name: Frankie Gomez   Date: 1/30/2017   My doctor: Erika Brian   My clinic: [address]  [facility] [address] [phone] My Control Medicine: none  My Rescue Medicine: Albuterol        Dose: 108   My Asthma Severity: intermittent  Avoid your asthma triggers: See attached        GREEN ZONE   Good Control    I feel good    No cough or wheeze    Can work, sleep and play without asthma symptoms       Take your asthma control medicine every day.    1. If exercise triggers your asthma, take Albuterol 2 puffs    15 minutes before exercise or sports, and    During exercise if you have asthma symptoms  2. Spacer to use with inhaler: yes - ask us if you do not have one             YELLOW ZONE Getting Worse  I have ANY of these:    I do not feel good    Cough or wheeze    Chest feels tight    Wake up at night   1. Keep taking your Green Zone medications  2. Start taking your rescue medicine:    every 20 minutes for up to 1 hour. Then every 4 hours for 24-48 hours.  3. If you stay in the Yellow Zone for more than 12-24 hours, contact your doctor.  4. If you do not return to the Green Zone in 12-24 hours or you get worse, start taking your oral steroid medicine if prescribed by your provider.           RED ZONE Medical Alert - Get Help  I have ANY of these:    I feel awful    Medicine is not helping    Breathing getting harder    Trouble walking or talking    Nose opens wide to breathe       1. Take your rescue medicine NOW  2. If your provider has prescribed an oral steroid medicine, start taking it NOW  3. Call your doctor NOW  4. If you are still in the Red Zone after 20 minutes and you have not reached your doctor:    Take your rescue medicine again and    Call 911 or go to the emergency room right away    See your regular doctor within 2 weeks of an Emergency Room or Urgent Care visit for follow-up treatment.          Electronically signed by: Erika  MD Snehal, January 30, 2017    Annual Reminders:  Meet with Asthma Educator,  Flu Shot in the Fall, consider Pneumonia Vaccination for patients with asthma (aged 19 and older).                                  Asthma Triggers  How To Control Things That Make Your Asthma Worse    Triggers are things that make your asthma worse.  Look at the list below to help you find your triggers and what you can do about them.  You can help prevent asthma flare-ups by staying away from your triggers.      Trigger                                                          What you can do   Cigarette Smoke  Tobacco smoke can make asthma worse. Do not allow smoking in your home, car or around you.  Be sure no one smokes at a child s day care or school.  If you smoke, ask your health care provider for ways to help you quick.  Ask family members to quit too.  Ask your health care provider for a referral to Quit plan to help you quit smoking, or call 8-321-686-PLAN.     Colds, Flu, Bronchitis  These are common triggers of asthma. Wash your hands often.  Don t touch your eyes, nose or mouth.  Get a flu shot every year.     Dust Mites  These are tiny bugs that live in cloth or carpet. They are too small to see. Wash sheets and blankets in hot water every week.   Encase pillows and mattress in dust mite proof covers.  Avoid having carpet if you can. If you have carpet, vacuum weekly.   Use a dust mask and HEPA vacuum.   Pollen and Outdoor Mold  Some people are allergic to trees, grass, or weed pollen, or molds. Try to keep your windows closed.  Limit time out doors when pollen count is high.   Ask you health care provider about taking medicine during allergy season.     Animal Dander  Some people are allergic to skin flakes, urine or saliva from pets with fur or feathers. Keep pets with fur or feathers out of your home.    If you can t keep the pet outdoors, then keep the pet out of your bedroom.  Keep the bedroom door closed.  Keep pets off  cloth furniture and away from stuffed toys.     Mice, Rats, and Cockroaches  Some people are allergic to the waste from these pets.   Cover food and garbage.  Clean up spills and food crumbs.  Store grease in the refrigerator.   Keep food out of the bedroom.   Indoor Mold  This can be a trigger if your home has high moisture Fix leaking faucets, pipes, or other sources of water.   Clean moldy surfaces.  Dehumidify basement if it is damp and smelly.   Smoke, Strong Odors, and Sprays  These can reduce air quality. Stay away from strong odors and sprays, such as perfume, powder, hair spray, paints, smoke incense, paints, cleaning products, candles and new carpet.   Exercise or Sports  Some people with asthma have this trigger. Be active!  Ask you doctor about taking medicine before sports or exercise to prevent symptoms.    Warm up for 5-10 minutes before and after sports or exercise.     Other Triggers of Asthma  Cold air:  Cover your nose and mouth with a scarf.  Sometimes laughing or crying can be a trigger.  Some medicines and food can trigger asthma.

## 2017-01-31 ASSESSMENT — ANXIETY QUESTIONNAIRES: GAD7 TOTAL SCORE: 2

## 2017-01-31 ASSESSMENT — ASTHMA QUESTIONNAIRES: ACT_TOTALSCORE: 20

## 2017-01-31 ASSESSMENT — PATIENT HEALTH QUESTIONNAIRE - PHQ9: SUM OF ALL RESPONSES TO PHQ QUESTIONS 1-9: 5

## 2017-02-01 RX ORDER — ATORVASTATIN CALCIUM 10 MG/1
10 TABLET, FILM COATED ORAL DAILY
Qty: 90 TABLET | Refills: 1 | Status: SHIPPED | OUTPATIENT
Start: 2017-02-01 | End: 2017-12-12

## 2017-02-01 NOTE — TELEPHONE ENCOUNTER
Pharmacy pended - patient would like to start medication. Will send to [facility] to review/advise.  Noemy Harkins, CMA

## 2017-02-10 NOTE — H&P (VIEW-ONLY)
[address]  [facility] [address]  [phone]  Dept: [phone]    PRE-OP EVALUATION:  Today's date: 2017    Frankie Gomez (: 1980) presents for pre-operative evaluation assessment as requested by Dr. Reynoso.  He requires evaluation and anesthesia risk assessment prior to undergoing surgery/procedure for treatment of gallbladder .  Proposed procedure: LAPAROSCOPIC CHOLECYSTECTOMY    Date of Surgery/ Procedure: 17   Time of Surgery/ Procedure: 2:00pm  Hospital/Surgical Facility:  [facility]    Primary Physician: Erika Brian  Type of Anesthesia Anticipated: General    Patient has a Health Care Directive or Living Will:  NO    1. YES - DO YOU HAVE A HISTORY OF HEART ATTACK, STROKE, STENT, BYPASS OR SURGERY ON AN ARTERY IN THE HEAD, NECK, HEART OR LEG? cathetor ablation for SVT  2. NO - Do you ever have any pain or discomfort in your chest?  3. NO - Do you have a history of  Heart Failure?  4. YES - Are you troubled by shortness of breath when: walking on the level, up a slight hill or at night?  5. YES - Do you currently have a cold, bronchitis or other respiratory infection?  6. YES - Do you have a cough, shortness of breath or wheezing?  7. NO - Do you sometimes get pains in the calves of your legs when you walk?  8. NO - Do you or anyone in your family have previous history of blood clots?  9. NO - Do you or does anyone in your family have a serious bleeding problem such as prolonged bleeding following surgeries or cuts?  10. NO - Have you ever had problems with anemia or been told to take iron pills?  11. NO - Have you had any abnormal blood loss such as black, tarry or bloody stools, or abnormal vaginal bleeding?  12. NO - Have you ever had a blood transfusion?  13. NO - Have you or any of your relatives ever had problems with anesthesia?  14. YES - Do you have sleep apnea, excessive snoring or daytime drowsiness?  15. NO - Do you  have any prosthetic heart valves?  16. NO - Do you have prosthetic joints?  17. NO - Is there any chance that you may be pregnant?      HPI:                                                      Brief HPI related to upcoming procedure: recurrent ab pain -- gallbladder thought to be source      See problem list for active medical problems.  Problems all longstanding and stable, except as noted/documented.  See ROS for pertinent symptoms related to these conditions.                                                                                                  .    MEDICAL HISTORY:                                                      Patient Active Problem List    Diagnosis Date Noted     Diverticulosis of large intestine 01/01/2017     Priority: Medium     External hemorrhoids 01/01/2017     Priority: Medium     SVT (supraventricular tachycardia) (H) 05/27/2015     Priority: Medium     NASIR (obstructive sleep apnea) 12/15/2014     Priority: Medium     [place] 11/20/2014 AHI 5.2 RDI 11.8        Heartburn      Priority: Medium     Morbid obesity (H)      Priority: Medium     Hypertension      Priority: Medium     Hyperlipidemia      Priority: Medium     Diagnosis updated by automated process. Provider to review and confirm.       Asthma      Priority: Medium     [employer] Update utility       Fatty liver disease, nonalcoholic      Priority: Medium     Generalized anxiety disorder      Priority: Medium     Diagnosis updated by automated process. Provider to review and confirm.       Panic      Priority: Medium     Chronic low back pain      Priority: Medium     fall injury, fell through stairs-hit concrete       Lumbosacral neuritis 01/28/2013     Priority: Medium     CARDIOVASCULAR SCREENING; LDL GOAL LESS THAN 130 02/02/2011     Priority: Medium     Major depressive disorder, single episode, moderate (H) 02/02/2011     Priority: Medium     Displacement of intervertebral disc, site unspecified, without myelopathy 02/12/2007      Priority: Medium     Tobacco use disorder 11/21/2006     Priority: Medium     Pain in joint, forearm 06/25/2006     Priority: Medium     Degeneration of lumbar or lumbosacral intervertebral disc 08/10/2005     Priority: Medium      Past Medical History   Diagnosis Date     Morbid obesity (H)      Unspecified sinusitis (chronic)      Chronic low back pain      fall injury, fell through stairs-hit concrete     Hypertension      Hyperlipaemia      Diverticulitis      OA (osteoarthritis)      Fatty liver disease, nonalcoholic      Depression      MARISOL (generalised anxiety disorder)      Panic      NASIR (obstructive sleep apnea) 11/20/2014     AHI 5.2     SVT (supraventricular tachycardia) (H)      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     Former smoker      Past Surgical History   Procedure Laterality Date     Hc colonoscopy w snare removal tumor/polyp/lesion  1998     Appendectomy  07/25/09     Excise mass foot Right 2/13/2015     Procedure: EXCISE MASS FOOT;  Surgeon: Ramo Decker DPM;  Location: [facility] OR     Colonoscopy N/A 12/30/2016     Procedure: COMBINED COLONOSCOPY, SINGLE OR MULTIPLE BIOPSY/POLYPECTOMY BY BIOPSY;  Surgeon: Rikki Jenkins MD;  Location:  GI     Esophagoscopy, gastroscopy, duodenoscopy (egd), combined N/A 12/30/2016     Procedure: COMBINED ESOPHAGOSCOPY, GASTROSCOPY, DUODENOSCOPY (EGD), BIOPSY SINGLE OR MULTIPLE;  Surgeon: Rikki Jenkins MD;  Location: [facility] GI     Current Outpatient Prescriptions   Medication Sig Dispense Refill     IBUPROFEN PO Take 600 mg by mouth daily       order for DME Auto CPAP @@ 5-15 cm with heated humidifier via nasal mask. 1 Device 0     lisinopril-hydrochlorothiazide (PRINZIDE,ZESTORETIC) 10-12.5 MG per tablet Take 1 tablet by mouth daily 90 tablet 1     Acetaminophen (TYLENOL PO) Take 1,000 mg by mouth daily        omeprazole (PRILOSEC) 40 MG capsule Take 40 mg by mouth as needed       [DISCONTINUED] hydrochlorothiazide 12.5 MG TABS Take 1 tablet (12.5 mg) by  "mouth daily 90 tablet 3     OTC products: NSAIDS    Allergies   Allergen Reactions     No Known Allergies       Latex Allergy: NO    Social History   Substance Use Topics     Smoking status: Former Smoker -- 0.75 packs/day for 13 years     Types: Cigarettes     Quit date: 04/15/2013     Smokeless tobacco: Never Used      Comment: using electric cigarette     Alcohol Use: No     History   Drug Use     Yes     Special: Marijuana     Comment: marijuana occasional use       REVIEW OF SYSTEMS:                                                    Congestion noted today, improving.  A little cough but mucinex improved    Constitutional, neuro, ENT, endocrine, pulmonary, cardiac, gastrointestinal, genitourinary, musculoskeletal, integument and psychiatric systems are negative, except as otherwise noted.    EXAM:                                                    /80 mmHg  Pulse 80  Temp(Src) 98.6  F (37  C) (Temporal)  Resp 18  Ht 5' 11\" (1.803 m)  Wt 376 lb (170.552 kg)  BMI 52.46 kg/m2  SpO2 98%    GENERAL APPEARANCE: healthy, alert and no distress     EYES: EOMI, - PERRL     HENT: ear canals and TM's normal and nose and mouth without ulcers or lesions     NECK: no adenopathy, no asymmetry, masses, or scars and thyroid normal to palpation     RESP: lungs clear to auscultation - no rales, rhonchi or wheezes     BREAST: normal without masses, tenderness or nipple discharge and no palpable axillary masses or adenopathy     CV: regular rates and rhythm, normal S1 S2, no S3 or S4 and no murmur, click or rub -     ABDOMEN:  soft, nontender, no HSM or masses and bowel sounds normal     Rectal: Prostate symmetric w/o nodularity, no masses palpated     MS: extremities normal- no gross deformities noted, no evidence of inflammation in joints, FROM in all extremities.     SKIN: no suspicious lesions or rashes  Rectal:  Normal small prostate     NEURO: Normal strength and tone, sensory exam grossly normal, mentation intact " and speech normal     PSYCH: mentation appears normal. and affect normal/bright     LYMPHATICS: No axillary, cervical, inguinal, or supraclavicular nodes    DIAGNOSTICS:                                                    EKG: Not indicated due to non-vascular surgery and low risk of event (age <65 and without cardiac risk factors)    Recent Labs   Lab Test  12/15/16   1134  11/07/16   1706  09/11/16   2340   06/01/16   1745   08/12/13   1121   HGB  15.5  14.7  13.8   < >  14.2   < >  14.5   PLT  268  269  311   < >  276   < >  252   NA   --   137  140   < >  140   < >  143   POTASSIUM   --   3.7  3.4   < >  3.6   < >  3.9   CR  0.85  0.82  0.94   < >  0.75   < >  0.68   A1C   --    --    --    --   5.5   --   5.2    < > = values in this interval not displayed.        IMPRESSION:                                                    Reason for surgery/procedure: gallbladder   Diagnosis/reason for consult: asthma, depression, anxiety, morbid obesity, NASIR    The proposed surgical procedure is considered INTERMEDIATE risk.    REVISED CARDIAC RISK INDEX  The patient has the following serious cardiovascular risks for perioperative complications such as (MI, PE, VFib and 3  AV Block):  No serious cardiac risks  INTERPRETATION: 0 risks: Class I (very low risk - 0.4% complication rate)    The patient has the following additional risks for perioperative complications:  No identified additional risks      ICD-10-CM    1. Preop general physical exam Z01.818    2. Need for prophylactic vaccination and inoculation against influenza Z23    3. NASIR (obstructive sleep apnea) G47.33    4. Generalized anxiety disorder F41.1    5. Major depressive disorder, single episode, moderate (H) F32.1        RECOMMENDATIONS:                                                        Obstructive Sleep Apnea (or suspected sleep apnea)  Patient is to bring their home CPAP with them on the day of surgery        Anticoagulant or Antiplatelet Medication  Use  NSAIDS: Ibuprofen (Motrin):         Stop 7 day prior to surgery        ACE Inhibitor or Angiotensin Receptor Blocker (ARB) Use  Ace inhibitor or Angiotensin Receptor Blocker (ARB) and should HOLD this medication for the 24 hours prior to surgery.      APPROVAL GIVEN to proceed with proposed procedure, without further diagnostic evaluation       Patient has BPH like symptoms -- scoring 25 on the AUA scale, though most symptom descriptions sound to be muscular relaxtion issues.  Rectal done today with very tight sphincter and discomfort with exam suggestive of this as well.  Can do trial of flomax, but advised that therapy may be more helpful.  He is not sure about that.  Seems set on prostate is the issue.    Signed Electronically by: Erika Brian MD, MD    Copy of this evaluation report is provided to requesting physician.    [facility] Preop Guidelines  HPI      ROS      Physical Exam

## 2017-02-10 NOTE — TELEPHONE ENCOUNTER
Left message for return call.  Move to [facility] schedule if patient would like- Dr. Reynoso will be out.

## 2017-02-11 ENCOUNTER — ANESTHESIA EVENT (OUTPATIENT)
Dept: SURGERY | Facility: CLINIC | Age: 37
End: 2017-02-11
Payer: COMMERCIAL

## 2017-02-13 ENCOUNTER — OFFICE VISIT (OUTPATIENT)
Dept: SURGERY | Facility: CLINIC | Age: 37
End: 2017-02-13
Payer: COMMERCIAL

## 2017-02-13 ENCOUNTER — ANESTHESIA (OUTPATIENT)
Dept: SURGERY | Facility: CLINIC | Age: 37
End: 2017-02-13
Payer: COMMERCIAL

## 2017-02-13 ENCOUNTER — HOSPITAL ENCOUNTER (OUTPATIENT)
Facility: CLINIC | Age: 37
Discharge: HOME | End: 2017-02-14
Attending: SPECIALIST | Admitting: SPECIALIST
Payer: COMMERCIAL

## 2017-02-13 ENCOUNTER — SURGERY (OUTPATIENT)
Age: 37
End: 2017-02-13

## 2017-02-13 DIAGNOSIS — K82.8 BILIARY DYSKINESIA: Primary | ICD-10-CM

## 2017-02-13 DIAGNOSIS — G89.18 POST-OP PAIN: Primary | ICD-10-CM

## 2017-02-13 DIAGNOSIS — E66.01 MORBID OBESITY WITH BMI OF 50.0-59.9, ADULT (H): ICD-10-CM

## 2017-02-13 PROBLEM — Z90.49 S/P LAPAROSCOPIC CHOLECYSTECTOMY: Status: ACTIVE | Noted: 2017-02-13

## 2017-02-13 PROCEDURE — 25800025 ZZH RX 258: Performed by: NURSE ANESTHETIST, CERTIFIED REGISTERED

## 2017-02-13 PROCEDURE — 25000132 ZZH RX MED GY IP 250 OP 250 PS 637: Performed by: SPECIALIST

## 2017-02-13 PROCEDURE — 25000566 ZZH SEVOFLURANE, EA 15 MIN: Performed by: SPECIALIST

## 2017-02-13 PROCEDURE — 71000015 ZZH RECOVERY PHASE 1 LEVEL 2 EA ADDTL HR: Performed by: SPECIALIST

## 2017-02-13 PROCEDURE — 25000128 H RX IP 250 OP 636: Performed by: SPECIALIST

## 2017-02-13 PROCEDURE — 25000125 ZZHC RX 250: Performed by: NURSE ANESTHETIST, CERTIFIED REGISTERED

## 2017-02-13 PROCEDURE — 36000056 ZZH SURGERY LEVEL 3 1ST 30 MIN: Performed by: SPECIALIST

## 2017-02-13 PROCEDURE — 27110028 ZZH OR GENERAL SUPPLY NON-STERILE: Performed by: SPECIALIST

## 2017-02-13 PROCEDURE — 25800025 ZZH RX 258: Performed by: SPECIALIST

## 2017-02-13 PROCEDURE — 25000564 ZZH DESFLURANE, EA 15 MIN: Performed by: SPECIALIST

## 2017-02-13 PROCEDURE — 37000008 ZZH ANESTHESIA TECHNICAL FEE, 1ST 30 MIN: Performed by: SPECIALIST

## 2017-02-13 PROCEDURE — 40000306 ZZH STATISTIC PRE PROC ASSESS II: Performed by: SPECIALIST

## 2017-02-13 PROCEDURE — 71000027 ZZH RECOVERY PHASE 2 EACH 15 MINS: Performed by: SPECIALIST

## 2017-02-13 PROCEDURE — 71000014 ZZH RECOVERY PHASE 1 LEVEL 2 FIRST HR: Performed by: SPECIALIST

## 2017-02-13 PROCEDURE — 25000128 H RX IP 250 OP 636: Performed by: NURSE ANESTHETIST, CERTIFIED REGISTERED

## 2017-02-13 PROCEDURE — 36000058 ZZH SURGERY LEVEL 3 EA 15 ADDTL MIN: Performed by: SPECIALIST

## 2017-02-13 PROCEDURE — 25000132 ZZH RX MED GY IP 250 OP 250 PS 637: Performed by: NURSE ANESTHETIST, CERTIFIED REGISTERED

## 2017-02-13 PROCEDURE — 99213 OFFICE O/P EST LOW 20 MIN: CPT | Performed by: SPECIALIST

## 2017-02-13 PROCEDURE — 88304 TISSUE EXAM BY PATHOLOGIST: CPT | Performed by: SPECIALIST

## 2017-02-13 PROCEDURE — 88304 TISSUE EXAM BY PATHOLOGIST: CPT | Mod: 26 | Performed by: SPECIALIST

## 2017-02-13 PROCEDURE — 47562 LAPAROSCOPIC CHOLECYSTECTOMY: CPT | Mod: 22 | Performed by: SPECIALIST

## 2017-02-13 PROCEDURE — 37000009 ZZH ANESTHESIA TECHNICAL FEE, EACH ADDTL 15 MIN: Performed by: SPECIALIST

## 2017-02-13 PROCEDURE — 27210794 ZZH OR GENERAL SUPPLY STERILE: Performed by: SPECIALIST

## 2017-02-13 PROCEDURE — 25000125 ZZHC RX 250: Performed by: SPECIALIST

## 2017-02-13 RX ORDER — GLYCOPYRROLATE 0.2 MG/ML
INJECTION, SOLUTION INTRAMUSCULAR; INTRAVENOUS PRN
Status: DISCONTINUED | OUTPATIENT
Start: 2017-02-13 | End: 2017-02-13

## 2017-02-13 RX ORDER — ONDANSETRON 2 MG/ML
4 INJECTION INTRAMUSCULAR; INTRAVENOUS EVERY 6 HOURS PRN
Status: DISCONTINUED | OUTPATIENT
Start: 2017-02-13 | End: 2017-02-14 | Stop reason: HOSPADM

## 2017-02-13 RX ORDER — DIMENHYDRINATE 50 MG/ML
25 INJECTION, SOLUTION INTRAMUSCULAR; INTRAVENOUS
Status: DISCONTINUED | OUTPATIENT
Start: 2017-02-13 | End: 2017-02-13 | Stop reason: HOSPADM

## 2017-02-13 RX ORDER — ONDANSETRON 4 MG/1
4 TABLET, ORALLY DISINTEGRATING ORAL EVERY 30 MIN PRN
Status: DISCONTINUED | OUTPATIENT
Start: 2017-02-13 | End: 2017-02-13 | Stop reason: HOSPADM

## 2017-02-13 RX ORDER — DIMENHYDRINATE 50 MG/ML
INJECTION, SOLUTION INTRAMUSCULAR; INTRAVENOUS PRN
Status: DISCONTINUED | OUTPATIENT
Start: 2017-02-13 | End: 2017-02-13

## 2017-02-13 RX ORDER — OXYCODONE AND ACETAMINOPHEN 5; 325 MG/1; MG/1
1-2 TABLET ORAL EVERY 4 HOURS PRN
Status: DISCONTINUED | OUTPATIENT
Start: 2017-02-13 | End: 2017-02-14 | Stop reason: HOSPADM

## 2017-02-13 RX ORDER — SCOLOPAMINE TRANSDERMAL SYSTEM 1 MG/1
1 PATCH, EXTENDED RELEASE TRANSDERMAL ONCE
Status: COMPLETED | OUTPATIENT
Start: 2017-02-13 | End: 2017-02-13

## 2017-02-13 RX ORDER — HYDROXYZINE HYDROCHLORIDE 25 MG/1
50 TABLET, FILM COATED ORAL
Status: COMPLETED | OUTPATIENT
Start: 2017-02-13 | End: 2017-02-13

## 2017-02-13 RX ORDER — HYDROMORPHONE HYDROCHLORIDE 1 MG/ML
.3-.5 INJECTION, SOLUTION INTRAMUSCULAR; INTRAVENOUS; SUBCUTANEOUS
Status: DISCONTINUED | OUTPATIENT
Start: 2017-02-13 | End: 2017-02-14 | Stop reason: HOSPADM

## 2017-02-13 RX ORDER — SODIUM CHLORIDE, SODIUM LACTATE, POTASSIUM CHLORIDE, CALCIUM CHLORIDE 600; 310; 30; 20 MG/100ML; MG/100ML; MG/100ML; MG/100ML
INJECTION, SOLUTION INTRAVENOUS CONTINUOUS
Status: DISCONTINUED | OUTPATIENT
Start: 2017-02-13 | End: 2017-02-13 | Stop reason: HOSPADM

## 2017-02-13 RX ORDER — OXYCODONE HYDROCHLORIDE 5 MG/1
15 TABLET ORAL EVERY 4 HOURS PRN
Status: DISCONTINUED | OUTPATIENT
Start: 2017-02-13 | End: 2017-02-14 | Stop reason: HOSPADM

## 2017-02-13 RX ORDER — HYDRALAZINE HYDROCHLORIDE 20 MG/ML
2.5-5 INJECTION INTRAMUSCULAR; INTRAVENOUS EVERY 10 MIN PRN
Status: DISCONTINUED | OUTPATIENT
Start: 2017-02-13 | End: 2017-02-13 | Stop reason: HOSPADM

## 2017-02-13 RX ORDER — ONDANSETRON 2 MG/ML
4 INJECTION INTRAMUSCULAR; INTRAVENOUS EVERY 30 MIN PRN
Status: DISCONTINUED | OUTPATIENT
Start: 2017-02-13 | End: 2017-02-13 | Stop reason: HOSPADM

## 2017-02-13 RX ORDER — NALOXONE HYDROCHLORIDE 0.4 MG/ML
.1-.4 INJECTION, SOLUTION INTRAMUSCULAR; INTRAVENOUS; SUBCUTANEOUS
Status: DISCONTINUED | OUTPATIENT
Start: 2017-02-13 | End: 2017-02-14

## 2017-02-13 RX ORDER — MEPERIDINE HYDROCHLORIDE 50 MG/ML
12.5 INJECTION INTRAMUSCULAR; INTRAVENOUS; SUBCUTANEOUS
Status: DISCONTINUED | OUTPATIENT
Start: 2017-02-13 | End: 2017-02-13 | Stop reason: HOSPADM

## 2017-02-13 RX ORDER — FENTANYL CITRATE 50 UG/ML
25-50 INJECTION, SOLUTION INTRAMUSCULAR; INTRAVENOUS
Status: DISCONTINUED | OUTPATIENT
Start: 2017-02-13 | End: 2017-02-13 | Stop reason: HOSPADM

## 2017-02-13 RX ORDER — KETOROLAC TROMETHAMINE 30 MG/ML
30 INJECTION, SOLUTION INTRAMUSCULAR; INTRAVENOUS EVERY 6 HOURS PRN
Status: DISCONTINUED | OUTPATIENT
Start: 2017-02-13 | End: 2017-02-13 | Stop reason: HOSPADM

## 2017-02-13 RX ORDER — ALBUTEROL SULFATE 90 UG/1
AEROSOL, METERED RESPIRATORY (INHALATION) PRN
Status: DISCONTINUED | OUTPATIENT
Start: 2017-02-13 | End: 2017-02-13

## 2017-02-13 RX ORDER — ONDANSETRON 4 MG/1
4 TABLET, ORALLY DISINTEGRATING ORAL EVERY 6 HOURS PRN
Status: DISCONTINUED | OUTPATIENT
Start: 2017-02-13 | End: 2017-02-14 | Stop reason: HOSPADM

## 2017-02-13 RX ORDER — CEFAZOLIN SODIUM 1 G/50ML
3 SOLUTION INTRAVENOUS
Status: DISCONTINUED | OUTPATIENT
Start: 2017-02-13 | End: 2017-02-13 | Stop reason: HOSPADM

## 2017-02-13 RX ORDER — BUPIVACAINE HYDROCHLORIDE AND EPINEPHRINE 2.5; 5 MG/ML; UG/ML
INJECTION, SOLUTION INFILTRATION; PERINEURAL PRN
Status: DISCONTINUED | OUTPATIENT
Start: 2017-02-13 | End: 2017-02-13 | Stop reason: HOSPADM

## 2017-02-13 RX ORDER — OXYCODONE HYDROCHLORIDE 5 MG/1
10 TABLET ORAL EVERY 4 HOURS PRN
Status: DISCONTINUED | OUTPATIENT
Start: 2017-02-13 | End: 2017-02-14 | Stop reason: HOSPADM

## 2017-02-13 RX ORDER — LIDOCAINE HYDROCHLORIDE 20 MG/ML
INJECTION, SOLUTION INFILTRATION; PERINEURAL PRN
Status: DISCONTINUED | OUTPATIENT
Start: 2017-02-13 | End: 2017-02-13

## 2017-02-13 RX ORDER — DEXTROSE MONOHYDRATE, SODIUM CHLORIDE, AND POTASSIUM CHLORIDE 50; 1.49; 4.5 G/1000ML; G/1000ML; G/1000ML
INJECTION, SOLUTION INTRAVENOUS CONTINUOUS
Status: DISCONTINUED | OUTPATIENT
Start: 2017-02-13 | End: 2017-02-14 | Stop reason: HOSPADM

## 2017-02-13 RX ORDER — CEFAZOLIN SODIUM 1 G/3ML
1 INJECTION, POWDER, FOR SOLUTION INTRAMUSCULAR; INTRAVENOUS SEE ADMIN INSTRUCTIONS
Status: DISCONTINUED | OUTPATIENT
Start: 2017-02-13 | End: 2017-02-13 | Stop reason: HOSPADM

## 2017-02-13 RX ORDER — NEOSTIGMINE METHYLSULFATE 1 MG/ML
VIAL (ML) INJECTION PRN
Status: DISCONTINUED | OUTPATIENT
Start: 2017-02-13 | End: 2017-02-13

## 2017-02-13 RX ORDER — NALOXONE HYDROCHLORIDE 0.4 MG/ML
.1-.4 INJECTION, SOLUTION INTRAMUSCULAR; INTRAVENOUS; SUBCUTANEOUS
Status: DISCONTINUED | OUTPATIENT
Start: 2017-02-13 | End: 2017-02-14 | Stop reason: HOSPADM

## 2017-02-13 RX ORDER — OXYCODONE AND ACETAMINOPHEN 5; 325 MG/1; MG/1
1-2 TABLET ORAL
Status: COMPLETED | OUTPATIENT
Start: 2017-02-13 | End: 2017-02-13

## 2017-02-13 RX ORDER — ONDANSETRON 2 MG/ML
INJECTION INTRAMUSCULAR; INTRAVENOUS PRN
Status: DISCONTINUED | OUTPATIENT
Start: 2017-02-13 | End: 2017-02-13

## 2017-02-13 RX ORDER — OXYCODONE AND ACETAMINOPHEN 5; 325 MG/1; MG/1
1-2 TABLET ORAL EVERY 4 HOURS PRN
Qty: 30 TABLET | Refills: 0 | Status: SHIPPED | OUTPATIENT
Start: 2017-02-13 | End: 2017-02-16

## 2017-02-13 RX ORDER — NALOXONE HYDROCHLORIDE 0.4 MG/ML
.1-.4 INJECTION, SOLUTION INTRAMUSCULAR; INTRAVENOUS; SUBCUTANEOUS
Status: DISCONTINUED | OUTPATIENT
Start: 2017-02-13 | End: 2017-02-13 | Stop reason: HOSPADM

## 2017-02-13 RX ORDER — FENTANYL CITRATE 50 UG/ML
INJECTION, SOLUTION INTRAMUSCULAR; INTRAVENOUS PRN
Status: DISCONTINUED | OUTPATIENT
Start: 2017-02-13 | End: 2017-02-13

## 2017-02-13 RX ORDER — ALBUTEROL SULFATE 0.83 MG/ML
2.5 SOLUTION RESPIRATORY (INHALATION) EVERY 4 HOURS PRN
Status: DISCONTINUED | OUTPATIENT
Start: 2017-02-13 | End: 2017-02-13 | Stop reason: HOSPADM

## 2017-02-13 RX ORDER — PROPOFOL 10 MG/ML
INJECTION, EMULSION INTRAVENOUS PRN
Status: DISCONTINUED | OUTPATIENT
Start: 2017-02-13 | End: 2017-02-13

## 2017-02-13 RX ADMIN — FENTANYL CITRATE 50 MCG: 50 INJECTION, SOLUTION INTRAMUSCULAR; INTRAVENOUS at 16:21

## 2017-02-13 RX ADMIN — FENTANYL CITRATE 50 MCG: 50 INJECTION, SOLUTION INTRAMUSCULAR; INTRAVENOUS at 15:57

## 2017-02-13 RX ADMIN — SCOPALAMINE 1 PATCH: 1 PATCH, EXTENDED RELEASE TRANSDERMAL at 17:18

## 2017-02-13 RX ADMIN — FENTANYL CITRATE 50 MCG: 50 INJECTION, SOLUTION INTRAMUSCULAR; INTRAVENOUS at 14:20

## 2017-02-13 RX ADMIN — OXYCODONE HYDROCHLORIDE AND ACETAMINOPHEN 2 TABLET: 5; 325 TABLET ORAL at 16:39

## 2017-02-13 RX ADMIN — HYDROMORPHONE HYDROCHLORIDE 0.5 MG: 1 INJECTION, SOLUTION INTRAMUSCULAR; INTRAVENOUS; SUBCUTANEOUS at 23:40

## 2017-02-13 RX ADMIN — SODIUM CHLORIDE, POTASSIUM CHLORIDE, SODIUM LACTATE AND CALCIUM CHLORIDE: 600; 310; 30; 20 INJECTION, SOLUTION INTRAVENOUS at 13:18

## 2017-02-13 RX ADMIN — ONDANSETRON 4 MG: 2 INJECTION INTRAMUSCULAR; INTRAVENOUS at 14:15

## 2017-02-13 RX ADMIN — PHENYLEPHRINE HYDROCHLORIDE 100 MCG: 10 INJECTION, SOLUTION INTRAMUSCULAR; INTRAVENOUS; SUBCUTANEOUS at 14:28

## 2017-02-13 RX ADMIN — SUCCINYLCHOLINE CHLORIDE 200 MG: 20 INJECTION, SOLUTION INTRAMUSCULAR; INTRAVENOUS at 14:05

## 2017-02-13 RX ADMIN — DIMENHYDRINATE 25 MG: 50 INJECTION, SOLUTION INTRAMUSCULAR; INTRAVENOUS at 14:18

## 2017-02-13 RX ADMIN — KETOROLAC TROMETHAMINE 30 MG: 30 INJECTION, SOLUTION INTRAMUSCULAR at 16:04

## 2017-02-13 RX ADMIN — MIDAZOLAM HYDROCHLORIDE 2 MG: 1 INJECTION, SOLUTION INTRAMUSCULAR; INTRAVENOUS at 13:48

## 2017-02-13 RX ADMIN — FENTANYL CITRATE 100 MCG: 50 INJECTION, SOLUTION INTRAMUSCULAR; INTRAVENOUS at 15:41

## 2017-02-13 RX ADMIN — POTASSIUM CHLORIDE, DEXTROSE MONOHYDRATE AND SODIUM CHLORIDE: 150; 5; 450 INJECTION, SOLUTION INTRAVENOUS at 21:05

## 2017-02-13 RX ADMIN — GLYCOPYRROLATE 1 MG: 0.2 INJECTION, SOLUTION INTRAMUSCULAR; INTRAVENOUS at 15:32

## 2017-02-13 RX ADMIN — RANITIDINE HYDROCHLORIDE 150 MG: 150 TABLET, FILM COATED ORAL at 20:01

## 2017-02-13 RX ADMIN — NEOSTIGMINE METHYLSULFATE 5 MG: 1 INJECTION INTRAMUSCULAR; INTRAVENOUS; SUBCUTANEOUS at 15:32

## 2017-02-13 RX ADMIN — SODIUM CHLORIDE, POTASSIUM CHLORIDE, SODIUM LACTATE AND CALCIUM CHLORIDE: 600; 310; 30; 20 INJECTION, SOLUTION INTRAVENOUS at 14:17

## 2017-02-13 RX ADMIN — FENTANYL CITRATE 50 MCG: 50 INJECTION, SOLUTION INTRAMUSCULAR; INTRAVENOUS at 16:46

## 2017-02-13 RX ADMIN — HYDROMORPHONE HYDROCHLORIDE 0.5 MG: 1 INJECTION, SOLUTION INTRAMUSCULAR; INTRAVENOUS; SUBCUTANEOUS at 15:59

## 2017-02-13 RX ADMIN — Medication 20 ML: at 15:35

## 2017-02-13 RX ADMIN — LIDOCAINE HYDROCHLORIDE 40 MG: 20 INJECTION, SOLUTION INFILTRATION; PERINEURAL at 14:05

## 2017-02-13 RX ADMIN — HYDROMORPHONE HYDROCHLORIDE 0.5 MG: 1 INJECTION, SOLUTION INTRAMUSCULAR; INTRAVENOUS; SUBCUTANEOUS at 20:02

## 2017-02-13 RX ADMIN — HYDROMORPHONE HYDROCHLORIDE 0.5 MG: 1 INJECTION, SOLUTION INTRAMUSCULAR; INTRAVENOUS; SUBCUTANEOUS at 16:26

## 2017-02-13 RX ADMIN — PHENYLEPHRINE HYDROCHLORIDE 100 MCG: 10 INJECTION, SOLUTION INTRAMUSCULAR; INTRAVENOUS; SUBCUTANEOUS at 14:25

## 2017-02-13 RX ADMIN — FENTANYL CITRATE 50 MCG: 50 INJECTION, SOLUTION INTRAMUSCULAR; INTRAVENOUS at 16:11

## 2017-02-13 RX ADMIN — SODIUM CHLORIDE, POTASSIUM CHLORIDE, SODIUM LACTATE AND CALCIUM CHLORIDE: 600; 310; 30; 20 INJECTION, SOLUTION INTRAVENOUS at 17:09

## 2017-02-13 RX ADMIN — FENTANYL CITRATE 100 MCG: 50 INJECTION, SOLUTION INTRAMUSCULAR; INTRAVENOUS at 14:03

## 2017-02-13 RX ADMIN — ROCURONIUM BROMIDE 10 MG: 10 INJECTION INTRAVENOUS at 14:50

## 2017-02-13 RX ADMIN — HYDROXYZINE HYDROCHLORIDE 50 MG: 25 TABLET ORAL at 17:17

## 2017-02-13 RX ADMIN — ROCURONIUM BROMIDE 10 MG: 10 INJECTION INTRAVENOUS at 14:20

## 2017-02-13 RX ADMIN — HYDROMORPHONE HYDROCHLORIDE 1 MG: 1 INJECTION, SOLUTION INTRAMUSCULAR; INTRAVENOUS; SUBCUTANEOUS at 15:43

## 2017-02-13 RX ADMIN — ROCURONIUM BROMIDE 40 MG: 10 INJECTION INTRAVENOUS at 14:14

## 2017-02-13 RX ADMIN — FENTANYL CITRATE 50 MCG: 50 INJECTION, SOLUTION INTRAMUSCULAR; INTRAVENOUS at 18:56

## 2017-02-13 RX ADMIN — FENTANYL CITRATE 50 MCG: 50 INJECTION, SOLUTION INTRAMUSCULAR; INTRAVENOUS at 15:15

## 2017-02-13 RX ADMIN — LIDOCAINE HYDROCHLORIDE 0.1 ML: 10 INJECTION, SOLUTION EPIDURAL; INFILTRATION; INTRACAUDAL; PERINEURAL at 13:17

## 2017-02-13 RX ADMIN — Medication 40 ML: at 14:45

## 2017-02-13 RX ADMIN — HYDROMORPHONE HYDROCHLORIDE 0.5 MG: 1 INJECTION, SOLUTION INTRAMUSCULAR; INTRAVENOUS; SUBCUTANEOUS at 16:14

## 2017-02-13 RX ADMIN — PROPOFOL 400 MG: 10 INJECTION, EMULSION INTRAVENOUS at 14:05

## 2017-02-13 RX ADMIN — HYDROMORPHONE HYDROCHLORIDE 0.5 MG: 1 INJECTION, SOLUTION INTRAMUSCULAR; INTRAVENOUS; SUBCUTANEOUS at 16:38

## 2017-02-13 RX ADMIN — OXYCODONE HYDROCHLORIDE 10 MG: 5 TABLET ORAL at 21:10

## 2017-02-13 RX ADMIN — ALBUTEROL SULFATE 2 PUFF: 90 AEROSOL, METERED RESPIRATORY (INHALATION) at 13:40

## 2017-02-13 RX ADMIN — FENTANYL CITRATE 50 MCG: 50 INJECTION, SOLUTION INTRAMUSCULAR; INTRAVENOUS at 14:52

## 2017-02-13 ASSESSMENT — ENCOUNTER SYMPTOMS: DYSRHYTHMIAS: 1

## 2017-02-13 NOTE — IP AVS SNAPSHOT
MRN:[id number]                      After Visit Summary   2/13/2017    Frankie Gomez    MRN: [id number]           Thank you!     Thank you for choosing [facility] for your care. Our goal is always to provide you with excellent care. Hearing back from our patients is one way we can continue to improve our services. Please take a few minutes to complete the written survey that you may receive in the mail after you visit with us. Thank you!        Patient Information     Date Of Birth          1980        About your hospital stay     You were admitted on:  February 13, 2017 You last received care in the:  [address] [facility]    You were discharged on:  February 14, 2017       Who to Call     For medical emergencies, please call 911.  For non-urgent questions about your medical care, please call your primary care provider or clinic, [phone]  For questions related to your surgery, please call your surgery clinic        Attending Provider     Provider Specialty    Toby Bishop MD General Surgery       Primary Care Provider Office Phone # Fax #    Erika Kelsy Brian [phone]       [facility]  [address] [address]        After Care Instructions     Diet Instructions       Resume pre-procedure diet            Discharge Instructions       Patient to follow up with appointment in 7-10 days.            Do not - immerse incision in water until sutures removed       Do not immerse incision in water until sutures removed            Dressing       Keep dressing clean and dry.  Dressing / incisional care as instructed by RN and or Surgeon            Ice to affected area       Ice to operative site PRN            No Alcohol       For 24 hours post procedure            No driving or operating machinery        until the day after procedure            No lifting        No lifting over 20 lbs and no strenuous physical activity for  2 weeks             Shower       No shower for 24 hours post procedure. May shower Postoperative Day (POD)  2                  Your next 10 appointments already scheduled     Feb 20, 2017  2:45 PM CST   [facility]    [address] [place] [id number] [phone]              Further instructions from your care team                    Home Care Following Laparoscopic Cholecystectomy  Dr. Bishop    Wound Healing and Care of the Incision    Your recovery will be much quicker since you don t have a large abdominal incision.  During laparoscopic surgery, gas is put into your abdomen to lift the abdominal wall up and away from the operative site.  Before the end of your surgery, the doctor rinses the area with a sterile liquid.  Most, but not all of this gas is removed before your surgery ends.  Your body will absorb the rest.  You may experience pain in the shoulder areas or a bloated abdomen because of the gas.    Remove dressings/bandages after 48 hours.  If you  have small strips of tape over your incisions, leave them in place.  They will gradually curl up and fall off.  If any of them are still in place in two weeks, remove them.    You may then shower.  Pat your incisions dry after showering.  If there is any drainage from the incisions or if it is more comfortable for you, put a new band-aid/gauze over each incision.    Activity    For the first week, avoid heavy lifting (no more than 10 pounds) and strenuous activities, otherwise you may return to your usual activity.    Listen to what your body is telling you.  If any activity hurts: STOP and try it again in a few days.    Many patients report feeling more tired the second week after surgery.  This is most likely related to the healing process.    Returning to work will depend on how you feel and the type of work that you do and should be discussed with your doctor.  Most  people return to work within 1-2 weeks of surgery.    Drainage    You may experience drainage from one or more of your incisions.  If drainage is present, wash the area with mild soap and water twice a day.  If drainage is staining your clothing, cover with a light bandage (gauze or band-aid).  If you are concerned about the amount or the color of drainage, call your doctor.    Diet and Appetite    Stay on a low fat diet for the next 5-6 weeks.  It is not uncommon to experience some loose stools or diarrhea after surgery.  This is your body s way of adapting to the bile, which will slowly drain into your intestine.    Remember - most prescription pain pills can and do cause constipation.  Walking, extra fluids and increased fiber (fresh fruits and vegetables, etc.) are natural remedies for constipation.  Ask your doctor about a stool softener such as Colace or Metamucil.    Driving    Most people can drive within 2-3 days of surgery.  You should have stopped taking prescription pain medication and be pain free enough to make an emergency stop before beginning to drive.    Call your physician if you have:     Redness, or concerns about drainage from your incisions.    A temperature of more than 100.5 degrees F.    Pain not relieved by your prescription pain pills and or a short rest.    Any questions and concerns about your recovery.    Follow-up Care    Make an office appointment for 1 week after surgery.  Call to schedule. ([phone])      [facility]  Adult Discharge Orders & Instructions     For 24 hours after surgery    1. Get plenty of rest.  A responsible adult must stay with you for at least 24 hours after you leave the hospital.   2. Do not drive or use heavy equipment.  If you have weakness or tingling, don't drive or use heavy equipment until this feeling goes away.  3. Do not drink alcohol.  4. Avoid strenuous or risky activities.  Ask for help when climbing stairs.   5. You  "may feel lightheaded.  IF so, sit for a few minutes before standing.  Have someone help you get up.   6. If you have nausea (feel sick to your stomach): Drink only clear liquids such as apple juice, ginger ale, broth or 7-Up.  Rest may also help.  Be sure to drink enough fluids.  Move to a regular diet as you feel able.  7. You may have a slight fever. Call the doctor if your fever is over 100 F (37.7 C) (taken under the tongue) or lasts longer than 24 hours.  8. You may have a dry mouth, a sore throat, muscle aches or trouble sleeping.  These should go away after 24 hours.  9. Do not make important or legal decisions.   Call your doctor for any of the followin.  Signs of infection (fever, growing tenderness at the surgery site, a large amount of drainage or bleeding, severe pain, foul-smelling drainage, redness, swelling).    2. It has been over 8 to 10 hours since surgery and you are still not able to urinate (pass water).    3.  Headache for over 24 hours.    To contact a doctor, call [phone], a 24 hour nurse advice line.       Pending Results     Date and Time Order Name Status Description    2017 1532 Surgical pathology exam In process             Admission Information     Date & Time Provider Department Dept. Phone    2017 Toby Bishop MD [address] Medical Surgical [phone]      Your Vitals Were     Blood Pressure Pulse Temperature Respirations Height Weight    130/79 (BP Location: Right arm) 72 98.3  F (36.8  C) (Oral) 16 1.803 m (5' 11\") 166.9 kg (368 lb)    Pulse Oximetry BMI (Body Mass Index)                96% 51.33 kg/m2          [employer] Information     [employer] gives you secure access to your electronic health record. If you see a primary care provider, you can also send messages to your care team and make appointments. If you have questions, please call your primary care clinic.  If you do not have a primary care provider, please call [phone] and they will " assist you.        Care EveryWhere ID     This is your Care EveryWhere ID. This could be used by other organizations to access your [facility] medical records  [id number]           Review of your medicines      START taking        Dose / Directions    oxyCODONE-acetaminophen 5-325 MG per tablet   Commonly known as:  PERCOCET   Used for:  Post-op pain        Dose:  1-2 tablet   Take 1-2 tablets by mouth every 4 hours as needed for pain (moderate to severe)   Quantity:  30 tablet   Refills:  0         CONTINUE these medicines which have NOT CHANGED        Dose / Directions    atorvastatin 10 MG tablet   Commonly known as:  LIPITOR   Used for:  Hyperlipidemia, unspecified hyperlipidemia type        Dose:  10 mg   Take 1 tablet (10 mg) by mouth daily   Quantity:  90 tablet   Refills:  1       IBUPROFEN PO   Indication:  Mild to Moderate Pain        Dose:  600 mg   Take 600 mg by mouth daily Reported on 2/13/2017   Refills:  0       lisinopril-hydrochlorothiazide 10-12.5 MG per tablet   Commonly known as:  PRINZIDE/ZESTORETIC   Used for:  Essential hypertension with goal blood pressure less than 130/85        Dose:  1 tablet   Take 1 tablet by mouth daily   Quantity:  90 tablet   Refills:  1       omeprazole 40 MG capsule   Commonly known as:  priLOSEC        Dose:  40 mg   Take 40 mg by mouth as needed Reported on 2/13/2017   Refills:  0       * order for DME   Used for:  Sleep apnea, unspecified type        Auto CPAP @@ 5-15 cm with heated humidifier via nasal mask.   Quantity:  1 Device   Refills:  0       * order for DME   Used for:  NASIR (obstructive sleep apnea)        Equipment being ordered: cpap supplies -- tubing with humidity and nasal mask   Quantity:  1 Device   Refills:  0       tamsulosin 0.4 MG capsule   Commonly known as:  FLOMAX   Used for:  Benign prostatic hyperplasia with lower urinary tract symptoms, unspecified morphology        Dose:  0.4 mg   Take 1 capsule (0.4 mg) by mouth daily   Quantity:  30  capsule   Refills:  1       TYLENOL PO   Notes to Patient:  Make sure not to be taking at the same time as Percocet. Max daily acetaminophen dose of 4,000mg in a 24 hour period.        Dose:  1000 mg   Take 1,000 mg by mouth daily Reported on 2/13/2017   Refills:  0       * Notice:  This list has 2 medication(s) that are the same as other medications prescribed for you. Read the directions carefully, and ask your doctor or other care provider to review them with you.         Where to get your medicines      Some of these will need a paper prescription and others can be bought over the counter. Ask your nurse if you have questions.     Bring a paper prescription for each of these medications     oxyCODONE-acetaminophen 5-325 MG per tablet                Protect others around you: Learn how to safely use, store and throw away your medicines at www.disposemymeds.org.             Medication List: This is a list of all your medications and when to take them. Check marks below indicate your daily home schedule. Keep this list as a reference.      Medications           Morning Afternoon Evening Bedtime As Needed    atorvastatin 10 MG tablet   Commonly known as:  LIPITOR   Take 1 tablet (10 mg) by mouth daily                                   IBUPROFEN PO   Take 600 mg by mouth daily Reported on 2/13/2017                                   lisinopril-hydrochlorothiazide 10-12.5 MG per tablet   Commonly known as:  PRINZIDE/ZESTORETIC   Take 1 tablet by mouth daily                                   omeprazole 40 MG capsule   Commonly known as:  priLOSEC   Take 40 mg by mouth as needed Reported on 2/13/2017                                   * order for DME   Auto CPAP @@ 5-15 cm with heated humidifier via nasal mask.                                * order for DME   Equipment being ordered: cpap supplies -- tubing with humidity and nasal mask                                oxyCODONE-acetaminophen 5-325 MG per tablet   Commonly  known as:  PERCOCET   Take 1-2 tablets by mouth every 4 hours as needed for pain (moderate to severe)   Last time this was given:  2 tablets on 2/13/2017  4:39 PM                                   tamsulosin 0.4 MG capsule   Commonly known as:  FLOMAX   Take 1 capsule (0.4 mg) by mouth daily                                   TYLENOL PO   Take 1,000 mg by mouth daily Reported on 2/13/2017   Notes to Patient:  Make sure not to be taking at the same time as Percocet. Max daily acetaminophen dose of 4,000mg in a 24 hour period.                                   * Notice:  This list has 2 medication(s) that are the same as other medications prescribed for you. Read the directions carefully, and ask your doctor or other care provider to review them with you.

## 2017-02-13 NOTE — NURSING NOTE
"    Chief Complaint   Patient presents with     Consult     viridiana       Initial There were no vitals taken for this visit. Estimated body mass index is 52.44 kg/(m^2) as calculated from the following:    Height as of 1/30/17: 5' 11\" (1.803 m).    Weight as of 1/30/17: 376 lb (170.6 kg)..  BP completed using cuff size: NA (Not Taken)      Reva Rainey CMA  ([facility])    "

## 2017-02-13 NOTE — ANESTHESIA CARE TRANSFER NOTE
Patient: Frankie Gomez    Procedure(s):  Laparoscopic Cholecystectomy - Wound Class: II-Clean Contaminated    Diagnosis: biliary dyskinsea, morbid obesity  Diagnosis Additional Information: No value filed.    Anesthesia Type:   General, RSI, ETT     Note:  Airway :Face Mask  Patient transferred to:PACU        Vitals: (Last set prior to Anesthesia Care Transfer)    CRNA VITALS  2/13/2017 1511 - 2/13/2017 1547      2/13/2017             Pulse: 105    SpO2: 93 %    Resp Rate (observed): (!)  5                Electronically Signed By: TASHA Mijares CRNA  February 13, 2017  3:47 PM

## 2017-02-13 NOTE — BRIEF OP NOTE
[facility] Brief Operative Note    Pre-operative diagnosis: biliary dyskinsea, morbid obesity   Post-operative diagnosis Same   Procedure: Procedure(s):  Laparoscopic Cholecystectomy - Wound Class: II-Clean Contaminated - 22 modifier   Surgeon: Toby Bishop MD, FACS   Assistants(s): Jonah Degroot Surg Tech, Diana Garcia MS3   Estimated blood loss: Less than 10 ml    Specimens: Gallbladder   Findings: Gallbladder with adhesions and edema.  Morbid obesity making the procedure technically difficult     Toby Bishop MD, FACS    #[id number]

## 2017-02-13 NOTE — PROGRESS NOTES
F/U for biliary dyskinesia      Subjective:  Pt had seen Dr. Reynoso for biliary dyskinesia.  Was scheduled for OR today.  Dr. Reynoso is out on medical leave.  He is scheduled for his lap juan carlos today.  Now presents to meet me.  Still with RUQ pain. CCK reproduced his sx.   Denies nausea, vomiting, fever or chills.    Objective:  B/P: Data Unavailable, T: Data Unavailable, P: Data Unavailable, R: Data Unavailable  Abd: soft, non tender, non distended    Assessment/Plan:  Patient is a 36-year-old morbid obese white male with biliary dyskinesia. He was originally scheduled Dr. Reynoso but she is out on medical leave.  The patient elected to proceed with this procedure. Platelets times for laparoscopic cholecystectomy. The procedure, risks, benefits and alternative discussed were discussed and he agrees to proceed.    Toby Bishop MD, FACS

## 2017-02-13 NOTE — ANESTHESIA POSTPROCEDURE EVALUATION
Patient: Frankie Gomez    Procedure(s):  Laparoscopic Cholecystectomy - Wound Class: II-Clean Contaminated    Diagnosis:biliary dyskinsea, morbid obesity  Diagnosis Additional Information: No value filed.    Anesthesia Type:  General, RSI, ETT    Note:  Anesthesia Post Evaluation    Patient location during evaluation: PACU  Patient participation: Able to fully participate in evaluation  Level of consciousness: awake  Pain management: satisfactory to patient  Airway patency: patent  Cardiovascular status: acceptable and hemodynamically stable  Respiratory status: acceptable, room air and nonlabored ventilation  Hydration status: stable  PONV: none     Anesthetic complications: None    Comments: Patient was happy with the anesthesia care received and no anesthesia related complications were noted.  He did have higher than average pain from the procedure with increased amounts of narcotics needed.  I will follow up with the patient again if it is needed.        Last vitals:  Vitals:    02/13/17 1645 02/13/17 1650 02/13/17 1655   BP: 112/68 124/69 107/71   Resp: 12 14 21   Temp:      SpO2: 99% 98% 98%         Electronically Signed By: TASHA Pro CRNA  February 13, 2017  5:05 PM

## 2017-02-13 NOTE — TELEPHONE ENCOUNTER
Spoke to patient, he will proceed with surgery today with Dr. Bishop.  Patient scheduled free of charge consult with Dr. Bishop

## 2017-02-13 NOTE — IP AVS SNAPSHOT
[address] [facility]    [address] [facility]     [place] [address]    Phone:  [phone]                                       After Visit Summary   2/13/2017    Frankie Gomez    MRN: [id number]           After Visit Summary Signature Page     I have received my discharge instructions, and my questions have been answered. I have discussed any challenges I see with this plan with the nurse or doctor.    ..........................................................................................................................................  Patient/Patient Representative Signature      ..........................................................................................................................................  Patient Representative Print Name and Relationship to Patient    ..................................................               ................................................  Date                                            Time    ..........................................................................................................................................  Reviewed by Signature/Title    ...................................................              ..............................................  Date                                                            Time

## 2017-02-13 NOTE — DISCHARGE INSTRUCTIONS
Home Care Following Laparoscopic Cholecystectomy  Dr. Bishop    Wound Healing and Care of the Incision    Your recovery will be much quicker since you don t have a large abdominal incision.  During laparoscopic surgery, gas is put into your abdomen to lift the abdominal wall up and away from the operative site.  Before the end of your surgery, the doctor rinses the area with a sterile liquid.  Most, but not all of this gas is removed before your surgery ends.  Your body will absorb the rest.  You may experience pain in the shoulder areas or a bloated abdomen because of the gas.    Remove dressings/bandages after 48 hours.  If you  have small strips of tape over your incisions, leave them in place.  They will gradually curl up and fall off.  If any of them are still in place in two weeks, remove them.    You may then shower.  Pat your incisions dry after showering.  If there is any drainage from the incisions or if it is more comfortable for you, put a new band-aid/gauze over each incision.    Activity    For the first week, avoid heavy lifting (no more than 10 pounds) and strenuous activities, otherwise you may return to your usual activity.    Listen to what your body is telling you.  If any activity hurts: STOP and try it again in a few days.    Many patients report feeling more tired the second week after surgery.  This is most likely related to the healing process.    Returning to work will depend on how you feel and the type of work that you do and should be discussed with your doctor.  Most people return to work within 1-2 weeks of surgery.    Drainage    You may experience drainage from one or more of your incisions.  If drainage is present, wash the area with mild soap and water twice a day.  If drainage is staining your clothing, cover with a light bandage (gauze or band-aid).  If you are concerned about the amount or the color of drainage, call your doctor.    Diet and Appetite    Stay on a  low fat diet for the next 5-6 weeks.  It is not uncommon to experience some loose stools or diarrhea after surgery.  This is your body s way of adapting to the bile, which will slowly drain into your intestine.    Remember - most prescription pain pills can and do cause constipation.  Walking, extra fluids and increased fiber (fresh fruits and vegetables, etc.) are natural remedies for constipation.  Ask your doctor about a stool softener such as Colace or Metamucil.    Driving    Most people can drive within 2-3 days of surgery.  You should have stopped taking prescription pain medication and be pain free enough to make an emergency stop before beginning to drive.    Call your physician if you have:     Redness, or concerns about drainage from your incisions.    A temperature of more than 100.5 degrees F.    Pain not relieved by your prescription pain pills and or a short rest.    Any questions and concerns about your recovery.    Follow-up Care    Make an office appointment for 1 week after surgery.  Call to schedule. ([phone])      [facility]  Adult Discharge Orders & Instructions     For 24 hours after surgery    1. Get plenty of rest.  A responsible adult must stay with you for at least 24 hours after you leave the hospital.   2. Do not drive or use heavy equipment.  If you have weakness or tingling, don't drive or use heavy equipment until this feeling goes away.  3. Do not drink alcohol.  4. Avoid strenuous or risky activities.  Ask for help when climbing stairs.   5. You may feel lightheaded.  IF so, sit for a few minutes before standing.  Have someone help you get up.   6. If you have nausea (feel sick to your stomach): Drink only clear liquids such as apple juice, ginger ale, broth or 7-Up.  Rest may also help.  Be sure to drink enough fluids.  Move to a regular diet as you feel able.  7. You may have a slight fever. Call the doctor if your fever is over 100 F (37.7 C) (taken  under the tongue) or lasts longer than 24 hours.  8. You may have a dry mouth, a sore throat, muscle aches or trouble sleeping.  These should go away after 24 hours.  9. Do not make important or legal decisions.   Call your doctor for any of the followin.  Signs of infection (fever, growing tenderness at the surgery site, a large amount of drainage or bleeding, severe pain, foul-smelling drainage, redness, swelling).    2. It has been over 8 to 10 hours since surgery and you are still not able to urinate (pass water).    3.  Headache for over 24 hours.    To contact a doctor, call [phone], a 24 hour nurse advice line.

## 2017-02-13 NOTE — MR AVS SNAPSHOT
After Visit Summary   2/13/2017    Frankie Gomez    MRN: [id number]           Patient Information     Date Of Birth          1980        Visit Information        Provider Department      2/13/2017 9:30 AM Toby Bishop MD [facility]        Today's Diagnoses     Biliary dyskinesia    -  1    Morbid obesity with BMI of 50.0-59.9, adult (H)           Follow-ups after your visit        Your next 10 appointments already scheduled     Feb 13, 2017   Procedure with Toby Bishop MD   [facility] ([facility])    [address] [id number] [phone]           From [address]: Exit at [address] on south side of [place]. Turn right on [address]. Turn left at stoplight on [address]. [facility] will be in view two blocks ahead            Feb 20, 2017  2:45 PM CST   Return Visit with Toby Bishop MD   [facility] ([facility])    [address] [id number] [phone]              Who to contact     If you have questions or need follow up information about today's clinic visit or your schedule please contact [facility] directly at [phone].  Normal or non-critical lab and imaging results will be communicated to you by Data Virtualityhart, letter or phone within 4 business days after the clinic has received the results. If you do not hear from us within 7 days, please contact the clinic through Data Virtualityhart or phone. If you have a critical or abnormal lab result, we will notify you by phone as soon as possible.  Submit refill requests through OpinewsTV or call your pharmacy and they will forward the refill request to us. Please allow 3 business days for your refill to be completed.          Additional Information About Your Visit        [employer] Information     OpinewsTV gives you secure access to your electronic health record. If you see a primary  care provider, you can also send messages to your care team and make appointments. If you have questions, please call your primary care clinic.  If you do not have a primary care provider, please call [phone] and they will assist you.        Care EveryWhere ID     This is your Care EveryWhere ID. This could be used by other organizations to access your [place] medical records  [id number]         Blood Pressure from Last 3 Encounters:   01/30/17 112/80   12/30/16 (!) 143/94   11/07/16 124/90    Weight from Last 3 Encounters:   01/30/17 (!) 376 lb (170.6 kg)   01/09/17 (!) 384 lb (174.2 kg)   12/30/16 (!) 379 lb (171.9 kg)              Today, you had the following     No orders found for display       Primary Care Provider Office Phone # Fax #    Erika Kelsy Brian [phone]       [address] [address]        Thank you!     Thank you for choosing [facility]  for your care. Our goal is always to provide you with excellent care. Hearing back from our patients is one way we can continue to improve our services. Please take a few minutes to complete the written survey that you may receive in the mail after your visit with us. Thank you!             Your Updated Medication List - Protect others around you: Learn how to safely use, store and throw away your medicines at www.disposemymeds.org.          This list is accurate as of: 2/13/17 10:13 AM.  Always use your most recent med list.                   Brand Name Dispense Instructions for use    atorvastatin 10 MG tablet    LIPITOR    90 tablet    Take 1 tablet (10 mg) by mouth daily       IBUPROFEN PO      Take 600 mg by mouth daily Reported on 2/13/2017       lisinopril-hydrochlorothiazide 10-12.5 MG per tablet    PRINZIDE/ZESTORETIC    90 tablet    Take 1 tablet by mouth daily       omeprazole 40 MG capsule    priLOSEC     Take 40 mg by mouth as needed Reported on 2/13/2017       * order  for DME     1 Device    Auto CPAP @@ 5-15 cm with heated humidifier via nasal mask.       * order for DME     1 Device    Equipment being ordered: cpap supplies -- tubing with humidity and nasal mask       tamsulosin 0.4 MG capsule    FLOMAX    30 capsule    Take 1 capsule (0.4 mg) by mouth daily       TYLENOL PO      Take 1,000 mg by mouth daily Reported on 2/13/2017       * Notice:  This list has 2 medication(s) that are the same as other medications prescribed for you. Read the directions carefully, and ask your doctor or other care provider to review them with you.

## 2017-02-13 NOTE — ANESTHESIA PREPROCEDURE EVALUATION
Anesthesia Evaluation     . Pt has had prior anesthetic.     No history of anesthetic complications     ROS/MED HX    ENT/Pulmonary:     (+)sleep apnea, asthma Treatment: Inhaler prn,  uses CPAP , . .    Neurologic:  - neg neurologic ROS     Cardiovascular:     (+) hypertension----. : . . . :. dysrhythmias (hx SVT, none since cardiac ablation in 2015) . Previous cardiac testing Echodate:3-2015results:Valves normal, left ventricular concentric hypertrophy. EF 55-60%date: results:ECG reviewed date:6-1-16 results:Sinus Rhythm   -Prominent R(V1) and left axis for age -nonspecific -Seen with pulmonary or congenital heart disease -possible anterior fascicular block.    date: results:          METS/Exercise Tolerance:     Hematologic:  - neg hematologic  ROS       Musculoskeletal: Comment: CLBP        GI/Hepatic:     (+) GERD Asymptomatic on medication, liver disease,       Renal/Genitourinary:  - ROS Renal section negative       Endo:     (+) Obesity, .      Psychiatric:     (+) psychiatric history anxiety and depression      Infectious Disease:  - neg infectious disease ROS       Malignancy:      - no malignancy   Other:    - neg other ROS           Physical Exam  Normal systems: cardiovascular, pulmonary and dental    Airway   Mallampati: II  TM distance: >3 FB  Neck ROM: full    Dental     Cardiovascular   Rhythm and rate: regular and normal      Pulmonary    breath sounds clear to auscultation                    Anesthesia Plan      History & Physical Review  History and physical reviewed and following examination; no interval change.    ASA Status:  3 .    NPO Status:  > 8 hours    Plan for General and ETT with Intravenous and Propofol induction. Maintenance will be Inhalation and Balanced.    PONV prophylaxis:  Ondansetron (or other 5HT-3) and Meclizine or Dimenhydrinate  Will evaluate pt airway prior to paralytic as he has rare heartburn with no material ever in his throat.      Postoperative Care  Postoperative  pain management:  IV analgesics and Oral pain medications.      Consents  Anesthetic plan, risks, benefits and alternatives discussed with:  Patient.  Use of blood products discussed: No .   .                          .

## 2017-02-14 VITALS
OXYGEN SATURATION: 96 % | SYSTOLIC BLOOD PRESSURE: 130 MMHG | RESPIRATION RATE: 16 BRPM | HEIGHT: 71 IN | HEART RATE: 72 BPM | TEMPERATURE: 98.3 F | WEIGHT: 315 LBS | DIASTOLIC BLOOD PRESSURE: 79 MMHG | BODY MASS INDEX: 44.1 KG/M2

## 2017-02-14 DIAGNOSIS — R11.0 NAUSEA: Primary | ICD-10-CM

## 2017-02-14 LAB — GLUCOSE BLDC GLUCOMTR-MCNC: 126 MG/DL (ref 70–99)

## 2017-02-14 PROCEDURE — 82962 GLUCOSE BLOOD TEST: CPT

## 2017-02-14 PROCEDURE — 25800025 ZZH RX 258: Performed by: SPECIALIST

## 2017-02-14 PROCEDURE — 25000132 ZZH RX MED GY IP 250 OP 250 PS 637: Performed by: NURSE ANESTHETIST, CERTIFIED REGISTERED

## 2017-02-14 RX ORDER — ONDANSETRON 4 MG/1
4-8 TABLET, FILM COATED ORAL EVERY 8 HOURS PRN
Qty: 20 TABLET | Refills: 1 | Status: SHIPPED | OUTPATIENT
Start: 2017-02-14 | End: 2018-02-19

## 2017-02-14 RX ADMIN — POTASSIUM CHLORIDE, DEXTROSE MONOHYDRATE AND SODIUM CHLORIDE: 150; 5; 450 INJECTION, SOLUTION INTRAVENOUS at 06:30

## 2017-02-14 RX ADMIN — OXYCODONE HYDROCHLORIDE 10 MG: 5 TABLET ORAL at 10:11

## 2017-02-14 RX ADMIN — OXYCODONE HYDROCHLORIDE 10 MG: 5 TABLET ORAL at 01:27

## 2017-02-14 RX ADMIN — OXYCODONE HYDROCHLORIDE 10 MG: 5 TABLET ORAL at 06:30

## 2017-02-14 NOTE — PLAN OF CARE
Problem: Goal Outcome Summary  Goal: Goal Outcome Summary  Outcome: No Change  Patients vitals stable on room air. Patient using CPAP for sleep. Patient alert and oriented x4. Due to void. Patient has been tolerating fluids; advanced to regular diet and doing well. Dressings are clean, dry and intact. Lungs are clear. Patients pain is severe in abdomen. Dilaudid given at 2000 and oxycodone at 2115. CMS intact.

## 2017-02-14 NOTE — PROGRESS NOTES
Surgery POD #1      Subjective:  Pt is feeling much better - pain controlled with oral meds.  (+)Flatus      Objective:    Vitals:    02/13/17 2121 02/13/17 2230 02/13/17 2350 02/14/17 0730   BP: 140/75 121/78 124/84 130/79   BP Location:    Right arm   Pulse: 84 60 69 72   Resp: 16 18 18 16   Temp:  98.9  F (37.2  C) 98.9  F (37.2  C) 98.3  F (36.8  C)   TempSrc:  Oral Oral Oral   SpO2: 94% 97% 96% 96%   Weight:       Height:         Abd: soft, obese, incisions clean    Results for orders placed or performed during the hospital encounter of 02/13/17 (from the past 24 hour(s))   Glucose by meter   Result Value Ref Range    Glucose 126 (H) 70 - 99 mg/dL     Assessment/plan:  Pt s/p lap juan carlos - ONS for pain control.  Much improved.  Can go home.    Toby Bishop MD, FACS

## 2017-02-14 NOTE — OR NURSING
Transfer from   II to Room 256  Transferred to bed via Pt self assisit    S: 37 y/o male  S/P laparoscopic Cholecystectomy       Anesthesia Type:  general       Surgeon:  Dr. 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     Unspecified sinusitis (chronic)            Surgical History:    Past Surgical History   Procedure Laterality Date      colonoscopy w snare removal tumor/polyp/lesion  1998     Appendectomy  07/25/09     Excise mass foot Right 2/13/2015     Procedure: EXCISE MASS FOOT;  Surgeon: Ramo Decker DPM;  Location: [facility] OR     Colonoscopy N/A 12/30/2016     Procedure: COMBINED COLONOSCOPY, SINGLE OR MULTIPLE BIOPSY/POLYPECTOMY BY BIOPSY;  Surgeon: Rikki Jenkins MD;  Location:  GI     Esophagoscopy, gastroscopy, duodenoscopy (egd), combined N/A 12/30/2016     Procedure: COMBINED ESOPHAGOSCOPY, GASTROSCOPY, DUODENOSCOPY (EGD), BIOPSY SINGLE OR MULTIPLE;  Surgeon: Rikki Jenkins MD;  Location:  GI       A:  EBL: 10ml        IVF:  2200ml        UOP:  No urge        NPO:  ___Yes _x__No         Vomiting:  ___Yes _x__No         Drainage: see flowsheet        Skin Integrity: WDL except incisions        RFO: ___Yes_x__No         SSI Patient?  ___Yes__x_No         Brace/sling/equipment:  ___Yes_x__No          See flowsheets record for ongoing assessment, vital signs and pain assessment.    R: Post-Op vitals and assessments as ordered/indicated per  patient's condition.       Follow Post-Op orders and notify Physician prn.       Continue to involve patient/family in plan of care and discharge planning.       Reinforce Pre-Operative education.       Implement skin safety interventions as appropriate.    Telephone report given to MEI Beckham [facility]

## 2017-02-14 NOTE — PROGRESS NOTES
S-(situation): Patient registered to Observation. Patient arrived to room 256 via cart from ED.     B-(background): Lap Iman.     A-(assessment):  Vitals stable on 2L nasal cannula. Patients pain is severe in abdomen. Dressings are clean, dry and intact. Lungs are clear. CMS intact. Alert and oriented x4.     R-(recommendations): Orders and observation goals reviewed with patient.     Nursing Observation criteria listed below was met:    Skin issues/needs documented:Yes  Isolation needs addressed, if appropriate: NA  Fall Prevention: Education given and documented and signage used: Yes  Education Assessment documented:Yes  Education Documented (Pre-existing chronic infection such as, MRSA/VRE need education on admission): Yes  New medication patient education completed and documented (Possible Side Effects of Common Medications handout): Yes  Home medications if not able to send immediately home with family stored here: NA  Reminder note placed in discharge instructions: NA  Patient has discharge needs (If yes, please explain): No

## 2017-02-14 NOTE — PROGRESS NOTES
S:  End of shift note  B:  POD#1 cholecystectomy  A:  Pt afebrile.  VSS.  Had one dose one IV dilaudid.  PO oxycodone has controlled pain.  4 stabs wounds on abd c/d/i.  Pos. Bowel sounds.  No flatus.  Tolerating. Regular diet.  Up ambulated x1.  R:  Cont to monitor and control pain.

## 2017-02-14 NOTE — OP NOTE
DATE OF PROCEDURE:  2/13/2017      PREOPERATIVE DIAGNOSIS:  Biliary dyskinesia, morbid obesity.      POSTOPERATIVE DIAGNOSIS:  Biliary dyskinesia morbid obesity.      PROCEDURE:  Laparoscopic cholecystectomy, 22 modifier.        SURGEON:   Toby Bishop M.D.      FIRST ASSISTANT:  Jonah Degroot, Surg. Tech. who helped with exposure of the gallbladder.      SECOND ASSISTANT:   Diana Garcia MS3      ANESTHESIA:  General by endotracheal tube.      INDICATIONS FOR PROCEDURE:  Mr. Frankie Gomez is a 36-year-old gentleman who presented with multiple episodes of right upper quadrant pain and subsequent HIDA scan revealed biliary dyskinesia and CCK reproduced his symptoms.  For this reason, it was elected to take him to the operating room for a laparoscopic cholecystectomy.      OPERATIVE FINDINGS:  Gallbladder with adhesions and edema; morbid obesity did make the procedure technically difficult.      DETAILS OF PROCEDURE:  The patient was taken to the operating room and placed on the table in supine position.  After induction of general anesthesia, the abdomen was prepped and draped in sterile fashion.  He had an old supraumbilical incision from his laparoscopic appendectomy.  This was opened using an 11 blade.  We then bluntly dissected down to the fascia.  The fascia was opened using an 11 blade.  We then dissected into the preperitoneal space.  We attempted to identify his peritoneum which was technically difficult due to his extensive morbid obesity.  Eventually it was elected to attempt to pass a Visiport 5 mm port through this area and attempt to gain access to the abdomen.  This was done successfully and the abdomen was insufflated to 15 mmHg pressure.  Generalized inspection of the abdomen was carried out and there was no evidence of any injury to underlying bowel from the Visiport.  Two right upper quadrant 5 mm trocars and a 12 mm subxiphoid trocar was placed.  The gallbladder was grasped, pulled into  position and multiple omental adhesions were taken down using combination of ConMed and [place] dissector.  Eventually identified the base of the gallbladder.  We then dissected down to the medial and lateral aspects of the gallbladder which was quite edematous.  We continued to alternate medially and laterally eventually identifying the cystic duct.  The cystic artery was found to be right adjacent to the cystic duct and was dissected separately and cauterized, clipped and transected.  After skeletonizing the cystic duct, the cystic duct was then clipped and transected.  The gallbladder was removed from the liver bed using cautery and then removed from the abdomen using EndoCatch bag.  The area was then copiously irrigated.  All fluid was suctioned out.  We then reinspected the liver bed; there was no evidence of any bleeding.  The subxiphoid trocar was then removed and that area was then closed using a fascial closure device.  The remaining 5 mm trocars were removed, and the supraumbilical fascia was then closed using a figure-of-eight 0 Vicryl.  All skin incisions were closed using staples.  Sterile dressings were applied.      The patient was then taken from the operating room to the recovery room in stable condition to be sent home.         DELANEY ROLLINS MD             D: 2017 15:43   T: 2017 17:14   MT: SHANON#[id number]      Name:     ROSEMARIE CARLSON   MRN:      [id number]        Account:        [id number]   :      1980           Procedure Date: 2017      Document: [id number]

## 2017-02-14 NOTE — PROGRESS NOTES
S-(situation): Patient discharged to home via walking with his mother.    B-(background): Observation goals met     A-(assessment): Goals were met for D/C.    R-(recommendations): Discharge instructions reviewed with pt and Mom. Listed belongings gathered and returned to patient.  F/U appt has been made.  Patient Education resolved: Yes  New medications-Pt. Has been educated about reason of use and side effects Yes  Home and hospital acquired medications returned to patient NA  Medication Bin checked and emptied on discharge Yes

## 2017-02-15 LAB — COPATH REPORT: NORMAL

## 2017-02-16 RX ORDER — OXYCODONE AND ACETAMINOPHEN 5; 325 MG/1; MG/1
1-2 TABLET ORAL EVERY 4 HOURS PRN
Qty: 30 TABLET | Refills: 0 | Status: SHIPPED | OUTPATIENT
Start: 2017-02-16 | End: 2018-02-19

## 2017-02-16 NOTE — TELEPHONE ENCOUNTER
Refill for oxycodone needed. Surgery on Monday, gall bladder removed. Uses [facility] pharmacy. Please call.  Francia Lin RN-[facility]

## 2017-02-16 NOTE — TELEPHONE ENCOUNTER
Patient called and notifed RX has been placed at [facility] for patient pick-up.  Patient verbally states he understands place of Rx .........................................................Reva Rainey CMA  ([facility])

## 2017-02-20 ENCOUNTER — OFFICE VISIT (OUTPATIENT)
Dept: SURGERY | Facility: CLINIC | Age: 37
End: 2017-02-20
Payer: COMMERCIAL

## 2017-02-20 VITALS — BODY MASS INDEX: 52.87 KG/M2 | TEMPERATURE: 98.7 F | OXYGEN SATURATION: 100 % | HEART RATE: 135 BPM | WEIGHT: 315 LBS

## 2017-02-20 DIAGNOSIS — Z98.890 POST-OPERATIVE STATE: Primary | ICD-10-CM

## 2017-02-20 PROCEDURE — 99024 POSTOP FOLLOW-UP VISIT: CPT | Performed by: SPECIALIST

## 2017-02-20 RX ORDER — OXYCODONE AND ACETAMINOPHEN 5; 325 MG/1; MG/1
1 TABLET ORAL EVERY 6 HOURS PRN
Qty: 20 TABLET | Refills: 0 | Status: SHIPPED | OUTPATIENT
Start: 2017-02-20 | End: 2018-02-19

## 2017-02-20 NOTE — NURSING NOTE
"Chief Complaint   Patient presents with     Surgical Followup       Initial Pulse 135  Temp 98.7  F (37.1  C) (Temporal)  Wt (!) 379 lb 1.6 oz (172 kg)  SpO2 100%  BMI 52.87 kg/m2 Estimated body mass index is 52.87 kg/(m^2) as calculated from the following:    Height as of 2/13/17: 5' 11\" (1.803 m).    Weight as of this encounter: 379 lb 1.6 oz (172 kg).  Medication Reconciliation: complete    "

## 2017-02-20 NOTE — PROGRESS NOTES
F/U for lap juan carlos.    Subjective:  Pt feels OK.  Pre-op sx have resolved.   Passing gas, moving his bowels.  Had episode of vomiting and diarrhea yesterday - now resolved.  Feels better today.  Still some pain at subxyphoid port.    Objective:  B/P: Data Unavailable, T: 98.7, P: 135, R: Data Unavailable  Abd: Soft, non tender, non distended, staples out.  Some tenderness at subxyphoid port.      Assessment/Plan:  Pt s/p lap juan carlos. Doing well.  Pre-op sx have resolved.  He will increase her activity as fatty food intake as tolerated.  F/U with me PRN.  Knows go to ER is pain worsens or develops fevers/chills    Toby Bishop MD

## 2017-02-20 NOTE — MR AVS SNAPSHOT
After Visit Summary   2/20/2017    Frankie Gomez    MRN: [id number]           Patient Information     Date Of Birth          1980        Visit Information        Provider Department      2/20/2017 2:45 PM Toby Bishop MD [facility]        Today's Diagnoses     Post-operative state    -  1       Follow-ups after your visit        Who to contact     If you have questions or need follow up information about today's clinic visit or your schedule please contact [facility] directly at [phone].  Normal or non-critical lab and imaging results will be communicated to you by [employer] Portfolio[employer], letter or phone within 4 business days after the clinic has received the results. If you do not hear from us within 7 days, please contact the clinic through Palettet or phone. If you have a critical or abnormal lab result, we will notify you by phone as soon as possible.  Submit refill requests through [employer] or call your pharmacy and they will forward the refill request to us. Please allow 3 business days for your refill to be completed.          Additional Information About Your Visit        MyChart Information     [employer] gives you secure access to your electronic health record. If you see a primary care provider, you can also send messages to your care team and make appointments. If you have questions, please call your primary care clinic.  If you do not have a primary care provider, please call [phone] and they will assist you.        Care EveryWhere ID     This is your Care EveryWhere ID. This could be used by other organizations to access your [place] medical records  [id number]        Your Vitals Were     Pulse Temperature Pulse Oximetry BMI (Body Mass Index)          135 98.7  F (37.1  C) (Temporal) 100% 52.87 kg/m2         Blood Pressure from Last 3 Encounters:   02/14/17 130/79   01/30/17 112/80   12/30/16 (!) 143/94    Weight from Last 3 Encounters:   02/20/17  (!) 379 lb 1.6 oz (172 kg)   02/13/17 (!) 368 lb (166.9 kg)   01/30/17 (!) 376 lb (170.6 kg)              Today, you had the following     No orders found for display         Today's Medication Changes          These changes are accurate as of: 2/20/17  3:46 PM.  If you have any questions, ask your nurse or doctor.               These medicines have changed or have updated prescriptions.        Dose/Directions    * oxyCODONE-acetaminophen 5-325 MG per tablet   Commonly known as:  PERCOCET   This may have changed:  Another medication with the same name was added. Make sure you understand how and when to take each.   Used for:  Post-op pain   Changed by:  Maria Elena Reynoso MD        Dose:  1-2 tablet   Take 1-2 tablets by mouth every 4 hours as needed for pain (moderate to severe)   Quantity:  30 tablet   Refills:  0       * oxyCODONE-acetaminophen 5-325 MG per tablet   Commonly known as:  PERCOCET   This may have changed:  You were already taking a medication with the same name, and this prescription was added. Make sure you understand how and when to take each.   Used for:  Post-operative state   Changed by:  Toby Bishop MD        Dose:  1 tablet   Take 1 tablet by mouth every 6 hours as needed for pain   Quantity:  20 tablet   Refills:  0       * Notice:  This list has 2 medication(s) that are the same as other medications prescribed for you. Read the directions carefully, and ask your doctor or other care provider to review them with you.         Where to get your medicines      Some of these will need a paper prescription and others can be bought over the counter.  Ask your nurse if you have questions.     Bring a paper prescription for each of these medications     oxyCODONE-acetaminophen 5-325 MG per tablet                Primary Care Provider Office Phone # Fax #    Erika Brian -[phone]       [address] [address]        Thank you!      Thank you for choosing [facility]  for your care. Our goal is always to provide you with excellent care. Hearing back from our patients is one way we can continue to improve our services. Please take a few minutes to complete the written survey that you may receive in the mail after your visit with us. Thank you!             Your Updated Medication List - Protect others around you: Learn how to safely use, store and throw away your medicines at www.disposemymeds.org.          This list is accurate as of: 2/20/17  3:46 PM.  Always use your most recent med list.                   Brand Name Dispense Instructions for use    atorvastatin 10 MG tablet    LIPITOR    90 tablet    Take 1 tablet (10 mg) by mouth daily       IBUPROFEN PO      Take 600 mg by mouth daily Reported on 2/13/2017       lisinopril-hydrochlorothiazide 10-12.5 MG per tablet    PRINZIDE/ZESTORETIC    90 tablet    Take 1 tablet by mouth daily       omeprazole 40 MG capsule    priLOSEC     Take 40 mg by mouth as needed Reported on 2/13/2017       ondansetron 4 MG tablet    ZOFRAN    20 tablet    Take 1-2 tablets (4-8 mg) by mouth every 8 hours as needed for nausea       * order for DME     1 Device    Auto CPAP @@ 5-15 cm with heated humidifier via nasal mask.       * order for DME     1 Device    Equipment being ordered: cpap supplies -- tubing with humidity and nasal mask       * oxyCODONE-acetaminophen 5-325 MG per tablet    PERCOCET    30 tablet    Take 1-2 tablets by mouth every 4 hours as needed for pain (moderate to severe)       * oxyCODONE-acetaminophen 5-325 MG per tablet    PERCOCET    20 tablet    Take 1 tablet by mouth every 6 hours as needed for pain       tamsulosin 0.4 MG capsule    FLOMAX    30 capsule    Take 1 capsule (0.4 mg) by mouth daily       TYLENOL PO      Take 1,000 mg by mouth daily Reported on 2/13/2017       * Notice:  This list has 4 medication(s) that are the same as other medications prescribed for you.  Read the directions carefully, and ask your doctor or other care provider to review them with you.

## 2017-02-21 DIAGNOSIS — I10 ESSENTIAL HYPERTENSION WITH GOAL BLOOD PRESSURE LESS THAN 130/85: ICD-10-CM

## 2017-02-21 RX ORDER — LISINOPRIL/HYDROCHLOROTHIAZIDE 10-12.5 MG
1 TABLET ORAL DAILY
Qty: 90 TABLET | Refills: 0 | Status: SHIPPED | OUTPATIENT
Start: 2017-02-21 | End: 2017-07-05

## 2017-02-21 NOTE — TELEPHONE ENCOUNTER
Prinzide      Last Written Prescription Date: 06/01/16  Last Fill Quantity: 90, # refills: 1  Last Office Visit with [facility] or [facility] prescribing provider: 01/30/17       Potassium   Date Value Ref Range Status   11/07/2016 3.7 3.4 - 5.3 mmol/L Final     Creatinine   Date Value Ref Range Status   12/15/2016 0.85 0.66 - 1.25 mg/dL Final     BP Readings from Last 3 Encounters:   02/14/17 130/79   01/30/17 112/80   12/30/16 (!) 143/94     3 months approved per medication refill protocol.   Katerina Chester, [facility]  [phone]

## 2017-02-27 ENCOUNTER — TELEPHONE (OUTPATIENT)
Dept: SURGERY | Facility: CLINIC | Age: 37
End: 2017-02-27

## 2017-02-27 NOTE — TELEPHONE ENCOUNTER
Letter written per Dr. Bishop. MOISE for patient with this information. Letter will be at upper level specialty clinic.  Norman DODGE CMA

## 2017-02-27 NOTE — TELEPHONE ENCOUNTER
Reason for Call:  Other call back    Detailed comments: Dr. Bishop patient, had GB removed on 02/13/17. Patient went back to work today, they are requesting a return to work without restrictions. Please call with any questions or ready to be picked up     Phone Number Patient can be reached at: Home number on file [phone] (home)    Best Time: as soon as possible, patient went back to work today and they need a note today.     Can we leave a detailed message on this number? YES    Call taken on 2/27/2017 at 12:11 PM by Nkechi Hutchison

## 2017-02-27 NOTE — LETTER
[address] [address]  [phone]          February 27, 2017    RE:  Frankie Gomez                                                                                                                                                       [address] [address] [place] [id number]            To whom it may concern:    Frankie Gomez is under my professional care. He  may return to work with the following: The employee is ABLE to return to work today, unrestricted.         Sincerely,        Toby Bishop MD

## 2017-05-26 ENCOUNTER — ALLIED HEALTH/NURSE VISIT (OUTPATIENT)
Dept: PEDIATRICS | Facility: CLINIC | Age: 37
End: 2017-05-26
Payer: COMMERCIAL

## 2017-05-26 VITALS — SYSTOLIC BLOOD PRESSURE: 112 MMHG | HEART RATE: 92 BPM | DIASTOLIC BLOOD PRESSURE: 85 MMHG

## 2017-05-26 DIAGNOSIS — I10 ESSENTIAL HYPERTENSION: Primary | ICD-10-CM

## 2017-05-26 PROCEDURE — 99207 ZZC NO CHARGE NURSE ONLY: CPT | Performed by: FAMILY MEDICINE

## 2017-05-26 NOTE — PROGRESS NOTES
Frankie Gomez is enrolled/participating in the retail pharmacy Blood Pressure Goals Achievement Program (BPGAP).  Frankie Gomez was evaluated at [facility] on May 26, 2017 at which time his blood pressure was:    BP Readings from Last 3 Encounters:   05/26/17 112/85   02/14/17 130/79   01/30/17 112/80     Reviewed lifestyle modifications for blood pressure control and reduction: including making healthy food choices, managing weight, getting regular exercise, smoking cessation, reducing alcohol consumption, monitoring blood pressure regularly.     Frankie Gomez is not experiencing symptoms.    Follow-Up: BP is at goal of < 140/90mmHg (patient 18+ years of age with or without diabetes).  Recommended follow-up at pharmacy in 6 months.     Recommendation to Provider:    Completed by: Stef Hatch Pharm. D.  [facility] Pharmacy Manager  [phone]  5/26/2017

## 2017-05-26 NOTE — MR AVS SNAPSHOT
After Visit Summary   5/26/2017    Frankie Gomez    MRN: [id number]           Patient Information     Date Of Birth          1980        Visit Information        Provider Department      5/26/2017 6:47 PM Erika Brian MD [facility]        Today's Diagnoses     Essential hypertension    -  1       Follow-ups after your visit        Follow-up notes from your care team     Return in about 6 months (around 11/26/2017).      Who to contact     If you have questions or need follow up information about today's clinic visit or your schedule please contact [facility] directly at [phone].  Normal or non-critical lab and imaging results will be communicated to you by [email or web address], letter or phone within 4 business days after the clinic has received the results. If you do not hear from us within 7 days, please contact the clinic through [email or web address] or phone. If you have a critical or abnormal lab result, we will notify you by phone as soon as possible.  Submit refill requests through Your Last Chance or call your pharmacy and they will forward the refill request to us. Please allow 3 business days for your refill to be completed.          Additional Information About Your Visit        MyChart Information     Your Last Chance gives you secure access to your electronic health record. If you see a primary care provider, you can also send messages to your care team and make appointments. If you have questions, please call your primary care clinic.  If you do not have a primary care provider, please call [phone] and they will assist you.      Your Last Chance is an electronic gateway that provides easy, online access to your medical records. With Your Last Chance, you can request a clinic appointment, read your test results, renew a prescription or communicate with your care team.     To access your existing account, please contact your [facility] or call [phone] for  assistance.        Care EveryWhere ID     This is your Care EveryWhere ID. This could be used by other organizations to access your [facility] medical records  [id number]        Your Vitals Were     Pulse                   92            Blood Pressure from Last 3 Encounters:   05/26/17 112/85   02/14/17 130/79   01/30/17 112/80    Weight from Last 3 Encounters:   02/20/17 (!) 379 lb 1.6 oz (172 kg)   02/13/17 (!) 368 lb (166.9 kg)   01/30/17 (!) 376 lb (170.6 kg)              Today, you had the following     No orders found for display       Primary Care Provider Office Phone # Fax #    Erika Kelsy Brian [phone]       [address] [address]        Thank you!     Thank you for choosing [facility]  for your care. Our goal is always to provide you with excellent care. Hearing back from our patients is one way we can continue to improve our services. Please take a few minutes to complete the written survey that you may receive in the mail after your visit with us. Thank you!             Your Updated Medication List - Protect others around you: Learn how to safely use, store and throw away your medicines at www.disposemymeds.org.          This list is accurate as of: 5/26/17  6:49 PM.  Always use your most recent med list.                   Brand Name Dispense Instructions for use    atorvastatin 10 MG tablet    LIPITOR    90 tablet    Take 1 tablet (10 mg) by mouth daily       IBUPROFEN PO      Take 600 mg by mouth daily Reported on 2/13/2017       lisinopril-hydrochlorothiazide 10-12.5 MG per tablet    PRINZIDE/ZESTORETIC    90 tablet    Take 1 tablet by mouth daily       omeprazole 40 MG capsule    priLOSEC     Take 40 mg by mouth as needed Reported on 2/13/2017       ondansetron 4 MG tablet    ZOFRAN    20 tablet    Take 1-2 tablets (4-8 mg) by mouth every 8 hours as needed for nausea       * order for DME     1 Device    Auto CPAP @@ 5-15  cm with heated humidifier via nasal mask.       * order for DME     1 Device    Equipment being ordered: cpap supplies -- tubing with humidity and nasal mask       * oxyCODONE-acetaminophen 5-325 MG per tablet    PERCOCET    30 tablet    Take 1-2 tablets by mouth every 4 hours as needed for pain (moderate to severe)       * oxyCODONE-acetaminophen 5-325 MG per tablet    PERCOCET    20 tablet    Take 1 tablet by mouth every 6 hours as needed for pain       tamsulosin 0.4 MG capsule    FLOMAX    30 capsule    Take 1 capsule (0.4 mg) by mouth daily       TYLENOL PO      Take 1,000 mg by mouth daily Reported on 2/13/2017       * Notice:  This list has 4 medication(s) that are the same as other medications prescribed for you. Read the directions carefully, and ask your doctor or other care provider to review them with you.

## 2017-07-05 DIAGNOSIS — I10 ESSENTIAL HYPERTENSION WITH GOAL BLOOD PRESSURE LESS THAN 130/85: ICD-10-CM

## 2017-07-05 NOTE — TELEPHONE ENCOUNTER
lisinopril-hydrochlorothiazide (PRINZIDE/ZESTORETIC) 10-12.5 MG per tablet        Last Written Prescription Date: 02/21/17  Last Fill Quantity: 90, # refills: 0  Last Office Visit with G, [facility] or [facility] prescribing provider: 01/30/17       Potassium   Date Value Ref Range Status   11/07/2016 3.7 3.4 - 5.3 mmol/L Final     Creatinine   Date Value Ref Range Status   12/15/2016 0.85 0.66 - 1.25 mg/dL Final     BP Readings from Last 3 Encounters:   05/26/17 112/85   02/14/17 130/79   01/30/17 112/80

## 2017-07-07 RX ORDER — LISINOPRIL/HYDROCHLOROTHIAZIDE 10-12.5 MG
TABLET ORAL
Qty: 90 TABLET | Refills: 1 | Status: SHIPPED | OUTPATIENT
Start: 2017-07-07 | End: 2018-02-18

## 2017-07-07 NOTE — TELEPHONE ENCOUNTER
lisinopril-hydrochlorothiazide (PRINZIDE/ZESTORETIC) 10-12.5 MG per tablet  Prescription approved per [facility] Refill Protocol.    Meera Levi RN, BSN

## 2017-12-12 DIAGNOSIS — E78.5 HYPERLIPIDEMIA, UNSPECIFIED HYPERLIPIDEMIA TYPE: ICD-10-CM

## 2017-12-13 RX ORDER — ATORVASTATIN CALCIUM 10 MG/1
10 TABLET, FILM COATED ORAL DAILY
Qty: 30 TABLET | Refills: 0 | Status: SHIPPED | OUTPATIENT
Start: 2017-12-13 | End: 2018-01-24

## 2017-12-13 NOTE — TELEPHONE ENCOUNTER
Hello,  last fill date:10-  Last quantity:90    Thank You,  Diana Sullivan  Pharmacy Technician  [facility]  [phone]

## 2018-01-24 DIAGNOSIS — E78.5 HYPERLIPIDEMIA, UNSPECIFIED HYPERLIPIDEMIA TYPE: ICD-10-CM

## 2018-01-26 ENCOUNTER — TELEPHONE (OUTPATIENT)
Dept: FAMILY MEDICINE | Facility: OTHER | Age: 38
End: 2018-01-26

## 2018-01-26 RX ORDER — ATORVASTATIN CALCIUM 10 MG/1
TABLET, FILM COATED ORAL
Qty: 30 TABLET | Refills: 0 | Status: SHIPPED | OUTPATIENT
Start: 2018-01-26 | End: 2018-02-18

## 2018-01-26 NOTE — TELEPHONE ENCOUNTER
"Assuming patient would need an appointment - but will flag for RN to triage, due to being \"winded a couple of times\"  Noemy Harkins, [facility]      "

## 2018-01-26 NOTE — TELEPHONE ENCOUNTER
Atorvastatin    Routing refill request to provider for review/approval because:  Rocío given x1 and patient did not follow up, please advise    Maria Isabel Uriarte, RN, BSN

## 2018-01-26 NOTE — TELEPHONE ENCOUNTER
Patient reports using an old albuterol inhaler on occasion when he gets congested.   Advised he needs an appointment and was scheduled.     Next 5 appointments (look out 90 days)     Feb 19, 2018  5:30 PM CST   [facility]   [facility]    [address] [place] [address]   [phone]                Edie Nayak RN, BSN

## 2018-01-26 NOTE — TELEPHONE ENCOUNTER
Reason for call:  Medication  Reason for Call:  Medication or medication refill:    Do you use a [facility]?  Name of the pharmacy and phone number for the current request:  [facility]    Name of the medication requested: albuterol inhaler    Other request: pt would like to have this because he has be winded a couple times and he would like to keep this as an emergency when he is not feeling well. Pt was seen last 1/30/17. Please advise.     Can we leave a detailed message on this number? YES    Phone number patient can be reached at: Home number on file [phone] (home)    Best Time: anytime    Call taken on 1/26/2018 at 10:12 AM by Irais Chappell

## 2018-01-26 NOTE — TELEPHONE ENCOUNTER
Please notify due for yearly recheck in office and fasting lab recheck. Will give ramon refill x2. No further refills until seen.     AE patient    Quoc Metz PA-C  [facility]

## 2018-02-07 ENCOUNTER — TELEPHONE (OUTPATIENT)
Dept: FAMILY MEDICINE | Facility: OTHER | Age: 38
End: 2018-02-07

## 2018-02-07 NOTE — TELEPHONE ENCOUNTER
Frankie Gomez is a 37 year old male who calls with intermittent sore throat and pain in nostrils, lungs and chills.    NURSING ASSESSMENT:  Description: Feels like the nasal cavities are burning or stinging sensation, like he has a runny nose. Throat is intermittently hurting. Feels like he has fluid in the lungs, having pain in the lungs. Takig mucinex and stated having a slight productive cough that is clearish to tan. Having the chills, increased tiredness and achy. Feels more sluggish. Denies headache, facial pressure, runny nose, difficulty breathing.  Onset/duration:  About 5 days  Precip. factors:  asthma  Associated symptoms:  Cough, sore throat, chills, body aches, tired  Improves/worsens symptoms:  Same   Pain scale (0-10)  Intermittent pain in throat  Last exam/Treatment:  01/30/2017  Allergies:   Allergies   Allergen Reactions     No Known Allergies      NURSING PLAN: Nursing advice to patient discussed past 48 hour iveth of flu like symptoms. discussed home care measures and to be evaluated if no improvment    RECOMMENDED DISPOSITION:  See in 24 hours - for evaluation  Will comply with recommendation: Yes stated took today off so will go to  to be evaluated   If further questions/concerns or if symptoms do not improve, worsen or new symptoms develop, call your PCP or [facility] as soon as possible.    NOTES:  Disposition was determined by the first positive assessment question, therefore all previous assessment questions were negative    Guideline used:  Telephone Triage Protocols for Nurses, Fifth Edition, Nazia Tsai  Cough  Influenza  Sinus Problems  Nursing Judgement    Meera Levi, RN, BSN

## 2018-02-07 NOTE — TELEPHONE ENCOUNTER
Reason for call:  Patient reporting a symptom    Symptom or request: symptoms    Duration (how long have symptoms been present): 5 days    Have you been treated for this before? No    Additional comments: patient is asking to speak to a nurse he is not sure if he has a cold or possible sinus stuff going on he is wondering if he should be seen or just take some over the counter medication?    Phone Number patient can be reached at:  Home number on file [phone] (home) or Cell number on file:    Telephone Information:   Mobile [phone]       Best Time:  anytime    Can we leave a detailed message on this number:  YES    Call taken on 2/7/2018 at 12:34 PM by Tracy Huff

## 2018-02-09 NOTE — PROGRESS NOTES
SUBJECTIVE:   Frankie Gomez is a 37 year old male who presents to clinic today for the following health issues:    HPI    Hyperlipidemia Follow-Up      Rate your low fat/cholesterol diet?: fair    Taking statin?  Yes, no muscle aches from statin    Other lipid medications/supplements?:  none      Hypertension Follow-up      Outpatient blood pressures are not being checked.    Low Salt Diet: not monitoring salt      Depression and Anxiety Follow-Up    Status since last visit: No change    Other associated symptoms:None    Complicating factors:     Significant life event: No     Current substance abuse: Marijuana use    He has tried multiple different medications, but didn't like any of the side effects that went along with them.     PHQ-9 SCORE 1/12/2015 8/8/2016 1/30/2017   Total Score 5 - -   Total Score MyChart - - 5 (Mild depression)   Total Score - 8 -     MARISOL-7 SCORE 1/13/2015 8/8/2016 1/30/2017   Total Score 6 - -   Total Score - - 2 (minimal anxiety)   Total Score - 5 -     PHQ-9  English  PHQ-9   Any Language  MARISOL-7  Suicide Assessment Five-step Evaluation and Treatment (SAFE-T)    Asthma Follow-Up    Was ACT completed today?    Yes    ACT Total Scores 2/19/2018   ACT TOTAL SCORE -   ASTHMA ER VISITS -   ASTHMA HOSPITALIZATIONS -   ACT TOTAL SCORE (Goal Greater than or Equal to 20) 19   In the past 12 months, how many times did you visit the emergency room for your asthma without being admitted to the hospital? 0   In the past 12 months, how many times were you hospitalized overnight because of your asthma? 0       Recent asthma triggers that patient is dealing with: upper respiratory infections     Got a refill for his albuterol from the [facility] here in [facility]. Doesn't need any refills of his medications today, is trying to get them move to a pharamacy that will let him fill them for 3 months with his insurance. When he is not sick he rarely needs to use the inhaler, but when he is ill,  he does need to use it several times a day.     Urinary Problems  He reports that he still struggles with problems urinating and also with erectile dysfunction. He was found to have an enlarged prostate on NO in the past and given a trial of Flomax, but he reports never taking this medication, and does not remember being prescribed it or what it was for.     Respiratory Concern  He has a FHx of COPD, and has felt for a long time that he has an over abundance of mucous in his lungs and airways. Feels mostly in the lungs and sinuses, sometimes a consistency of long, sticky, thicker mucous.       Problem list and histories reviewed & adjusted, as indicated.  Additional history: as documented    Patient Active Problem List   Diagnosis     Degeneration of lumbar or lumbosacral intervertebral disc     Pain in joint, forearm     Tobacco use disorder     Displacement of intervertebral disc, site unspecified, without myelopathy     CARDIOVASCULAR SCREENING; LDL GOAL LESS THAN 130     Major depressive disorder, single episode, moderate (H)     Lumbosacral neuritis     Heartburn     Morbid obesity (H)     Hypertension     Hyperlipidemia     Asthma     Fatty liver disease, nonalcoholic     Generalized anxiety disorder     Panic     Chronic low back pain     NASIR (obstructive sleep apnea)     SVT (supraventricular tachycardia) (H)     Diverticulosis of large intestine     External hemorrhoids     S/P laparoscopic cholecystectomy     Past Surgical History:   Procedure Laterality Date     APPENDECTOMY  07/25/09     COLONOSCOPY N/A 12/30/2016    Procedure: COMBINED COLONOSCOPY, SINGLE OR MULTIPLE BIOPSY/POLYPECTOMY BY BIOPSY;  Surgeon: Rikki Jenkins MD;  Location:  GI     ESOPHAGOSCOPY, GASTROSCOPY, DUODENOSCOPY (EGD), COMBINED N/A 12/30/2016    Procedure: COMBINED ESOPHAGOSCOPY, GASTROSCOPY, DUODENOSCOPY (EGD), BIOPSY SINGLE OR MULTIPLE;  Surgeon: Rikki Jenkins MD;  Location:  GI     EXCISE MASS FOOT Right 2/13/2015  "   Procedure: EXCISE MASS FOOT;  Surgeon: Ramo Decker DPM;  Location: [facility] OR      COLONOSCOPY W SNARE REMOVAL TUMOR/POLYP/LESION  1998     LAPAROSCOPIC CHOLECYSTECTOMY N/A 2/13/2017    Procedure: LAPAROSCOPIC CHOLECYSTECTOMY;  Surgeon: Toby Bishop MD;  Location:  OR       Social History   Substance Use Topics     Smoking status: Former Smoker     Packs/day: 0.75     Years: 13.00     Types: Cigarettes     Quit date: 4/15/2013     Smokeless tobacco: Never Used      Comment: using electric cigarette     Alcohol use No     Family History   Problem Relation Age of Onset     Connective Tissue Disorder Mother      sjogrens     Depression Mother      on meds     Neurologic Disorder Mother      restless leg, migraines     Thyroid Disease Mother      C.A.D. Mother      Obesity Mother      Az-en-Y, snores     Obesity Father      snores     CANCER Paternal Grandmother      Obesity Paternal Grandfather            ROS:  Constitutional, HEENT, cardiovascular, pulmonary, GI, , musculoskeletal, neuro, skin, endocrine and psych systems are negative, except as in HPI or otherwise noted.     This document serves as a record of the services and decisions personally performed and made by Erika Brian MD. It was created on her behalf by Nena Sibley, a trained medical scribe. The creation of this document is based the provider's statements to the medical scribe.  Nena Sibley, February 19, 2018 5:47 PM       OBJECTIVE:                                                    /78  Pulse 94  Temp 98.2  F (36.8  C) (Temporal)  Resp 14  Ht 1.803 m (5' 11\")  Wt (!) 166.9 kg (368 lb)  SpO2 98%  BMI 51.33 kg/m2  Body mass index is 51.33 kg/(m^2).   GENERAL: healthy, alert, well nourished, well hydrated, no distress, morbidly obese  RESP: lungs clear to auscultation - no rales, rhonchi or wheezes  CV: regular rates and rhythm, normal S1 S2, no S3 or S4, no murmur, click or rub  SKIN: no suspicious lesions, no rashes to " visible skin  NEURO: mentation intact and speech normal  PSYCH: Alert and oriented times 3; speech- coherent , normal rate and volume; able to articulate logical thoughts, able to abstract reason, no tangential thoughts, no hallucinations or delusions, affect- normal    Diagnostic test results:  No results found for this or any previous visit (from the past 24 hour(s)).     ASSESSMENT/PLAN:                                                        ICD-10-CM    1. Essential hypertension with goal blood pressure less than 130/85 I10 BASIC METABOLIC PANEL     lisinopril-hydrochlorothiazide (PRINZIDE/ZESTORETIC) 10-12.5 MG per tablet     tamsulosin (FLOMAX) 0.4 MG capsule     aspirin 81 MG EC tablet   2. Need for prophylactic vaccination and inoculation against influenza Z23    3. Hyperlipidemia, unspecified hyperlipidemia type E78.5 Lipid panel reflex to direct LDL Fasting     atorvastatin (LIPITOR) 10 MG tablet     aspirin 81 MG EC tablet   4. Morbid obesity (H) E66.01 BASIC METABOLIC PANEL     Lipid panel reflex to direct LDL Fasting     aspirin 81 MG EC tablet     Mood: Reviewed his symptoms and discussed different solutions for improving mood, including mood lights, Vitamin D supplements, cardiovascular exercise, counselor. NOT interested in meds and claims he is safe despite the 1 on the last question for phq9.    HTN: well controlled, though is having trouble getting to pharm every 30 days, so considering mail order. Printed for this today.    BPH: still with symptoms, has not yet tried flomax.  Will try -- ordered    Lipid: needs to return fasting for labs as also had abnormal high BS during hospitalization, need recheck fasting glucose as well.    Patient Instructions   Will schedule a lab appointment for 8 AM 2/21/18, to have your fasting labs completed when you are able to be fasting for 8 hours. Once you have these labs completed, you will be informed of the results of these tests.     Recommended trying a low  dose aspirin (81 mg) to treat your ED as this can improve blood flow somewhat. The Flowmax can also help to reduce the size of the prostate, and improve urinary symptoms.     Discussed potential causes for thicker mucous in the lungs, including being a carrier or cystic fibrosis. Diagnosing you as a carrier would require a genetic test. Recommended you reduce exposures to smoke and dust, and can try loosening in the mucous with steam from humidifier, or shower, or nebulizer. Using a humidifier can probably help to reduce the amount of coughing by keeping the airways moist. Can also try a nasal rinse, such as a NetiPot which can clear mucous out of the sinuses.     ACT given to complete and return.      Length of visit was 40 minutes with more than 50 percent of that time used for discussing medical concerns and education    Patient instructions discussed with patient    The information in this document, created by the medical scribe for me, accurately reflects the services I personally performed and the decisions made by me. I have reviewed and approved this document for accuracy.   MD Erika Yoo MD, MD  [facility]

## 2018-02-19 ENCOUNTER — OFFICE VISIT (OUTPATIENT)
Dept: FAMILY MEDICINE | Facility: OTHER | Age: 38
End: 2018-02-19
Payer: COMMERCIAL

## 2018-02-19 VITALS
RESPIRATION RATE: 14 BRPM | DIASTOLIC BLOOD PRESSURE: 78 MMHG | BODY MASS INDEX: 44.1 KG/M2 | HEIGHT: 71 IN | WEIGHT: 315 LBS | OXYGEN SATURATION: 98 % | SYSTOLIC BLOOD PRESSURE: 122 MMHG | TEMPERATURE: 98.2 F | HEART RATE: 94 BPM

## 2018-02-19 DIAGNOSIS — E66.01 MORBID OBESITY (H): ICD-10-CM

## 2018-02-19 DIAGNOSIS — E78.5 HYPERLIPIDEMIA, UNSPECIFIED HYPERLIPIDEMIA TYPE: ICD-10-CM

## 2018-02-19 DIAGNOSIS — Z23 NEED FOR PROPHYLACTIC VACCINATION AND INOCULATION AGAINST INFLUENZA: ICD-10-CM

## 2018-02-19 DIAGNOSIS — I10 ESSENTIAL HYPERTENSION WITH GOAL BLOOD PRESSURE LESS THAN 130/85: Primary | ICD-10-CM

## 2018-02-19 PROCEDURE — 99215 OFFICE O/P EST HI 40 MIN: CPT | Performed by: FAMILY MEDICINE

## 2018-02-19 RX ORDER — ALBUTEROL SULFATE 90 UG/1
AEROSOL, METERED RESPIRATORY (INHALATION)
COMMUNITY
Start: 2018-02-07 | End: 2021-05-10

## 2018-02-19 RX ORDER — ATORVASTATIN CALCIUM 10 MG/1
10 TABLET, FILM COATED ORAL DAILY
Qty: 90 TABLET | Refills: 3 | Status: SHIPPED | OUTPATIENT
Start: 2018-02-19 | End: 2019-03-29

## 2018-02-19 RX ORDER — TAMSULOSIN HYDROCHLORIDE 0.4 MG/1
0.4 CAPSULE ORAL DAILY
Qty: 90 CAPSULE | Refills: 3 | Status: SHIPPED | OUTPATIENT
Start: 2018-02-19 | End: 2019-03-28

## 2018-02-19 RX ORDER — LISINOPRIL/HYDROCHLOROTHIAZIDE 10-12.5 MG
1 TABLET ORAL DAILY
Qty: 90 TABLET | Refills: 3 | Status: SHIPPED | OUTPATIENT
Start: 2018-02-19 | End: 2019-03-28

## 2018-02-19 ASSESSMENT — ANXIETY QUESTIONNAIRES
6. BECOMING EASILY ANNOYED OR IRRITABLE: NOT AT ALL
2. NOT BEING ABLE TO STOP OR CONTROL WORRYING: NOT AT ALL
7. FEELING AFRAID AS IF SOMETHING AWFUL MIGHT HAPPEN: NOT AT ALL
GAD7 TOTAL SCORE: 1
IF YOU CHECKED OFF ANY PROBLEMS ON THIS QUESTIONNAIRE, HOW DIFFICULT HAVE THESE PROBLEMS MADE IT FOR YOU TO DO YOUR WORK, TAKE CARE OF THINGS AT HOME, OR GET ALONG WITH OTHER PEOPLE: NOT DIFFICULT AT ALL
1. FEELING NERVOUS, ANXIOUS, OR ON EDGE: SEVERAL DAYS
3. WORRYING TOO MUCH ABOUT DIFFERENT THINGS: NOT AT ALL
5. BEING SO RESTLESS THAT IT IS HARD TO SIT STILL: NOT AT ALL

## 2018-02-19 ASSESSMENT — PATIENT HEALTH QUESTIONNAIRE - PHQ9: 5. POOR APPETITE OR OVEREATING: NOT AT ALL

## 2018-02-19 ASSESSMENT — PAIN SCALES - GENERAL: PAINLEVEL: NO PAIN (0)

## 2018-02-19 NOTE — PATIENT INSTRUCTIONS
Will schedule a lab appointment for 8 AM 2/21/18, to have your fasting labs completed when you are able to be fasting for 8 hours. Once you have these labs completed, you will be informed of the results of these tests.     Recommended trying a low dose aspirin (81 mg) to treat your ED as this can improve blood flow somewhat. The Flowmax can also help to reduce the size of the prostate, and improve urinary symptoms.     Discussed potential causes for thicker mucous in the lungs, including being a carrier or cystic fibrosis. Diagnosing you as a carrier would require a genetic test. Recommended you reduce exposures to smoke and dust, and can try loosening in the mucous with steam from humidifier, or shower, or nebulizer. Using a humidifier can probably help to reduce the amount of coughing by keeping the airways moist. Can also try a nasal rinse, such as a NetiPot which can clear mucous out of the sinuses.     ACT given.

## 2018-02-19 NOTE — MR AVS SNAPSHOT
After Visit Summary   2/19/2018    Frankie Gomez    MRN: [id number]           Patient Information     Date Of Birth          1980        Visit Information        Provider Department      2/19/2018 5:30 PM Erika Brian MD [facility]        Today's Diagnoses     Essential hypertension with goal blood pressure less than 130/85    -  1    Need for prophylactic vaccination and inoculation against influenza        Hyperlipidemia, unspecified hyperlipidemia type        Morbid obesity (H)          Care Instructions    Will schedule a lab appointment for 8 AM 2/21/18, to have your fasting labs completed when you are able to be fasting for 8 hours. Once you have these labs completed, you will be informed of the results of these tests.     Recommended trying a low dose aspirin (81 mg) to treat your ED as this can improve blood flow somewhat. The Flowmax can also help to reduce the size of the prostate, and improve urinary symptoms.     Discussed potential causes for thicker mucous in the lungs, including being a carrier or cystic fibrosis. Diagnosing you as a carrier would require a genetic test. Recommended you reduce exposures to smoke and dust, and can try loosening in the mucous with steam from humidifier, or shower, or nebulizer. Using a humidifier can probably help to reduce the amount of coughing by keeping the airways moist. Can also try a nasal rinse, such as a NetiPot which can clear mucous out of the sinuses.     ACT given.           Follow-ups after your visit        Follow-up notes from your care team     Return in about 2 days (around 2/21/2018), or if symptoms worsen or fail to improve, for Lab Work.      Future tests that were ordered for you today     Open Future Orders        Priority Expected Expires Ordered    BASIC METABOLIC PANEL Routine  3/19/2018 2/19/2018    Lipid panel reflex to direct LDL Fasting Routine  3/19/2018 2/19/2018            Who to contact     If  "you have questions or need follow up information about today's clinic visit or your schedule please contact [facility] directly at [phone].  Normal or non-critical lab and imaging results will be communicated to you by Barburritohart, letter or phone within 4 business days after the clinic has received the results. If you do not hear from us within 7 days, please contact the clinic through Barburritohart or phone. If you have a critical or abnormal lab result, we will notify you by phone as soon as possible.  Submit refill requests through Schoo or call your pharmacy and they will forward the refill request to us. Please allow 3 business days for your refill to be completed.          Additional Information About Your Visit        BarburritoharTelematik Information     Schoo gives you secure access to your electronic health record. If you see a primary care provider, you can also send messages to your care team and make appointments. If you have questions, please call your primary care clinic.  If you do not have a primary care provider, please call [phone] and they will assist you.        Care EveryWhere ID     This is your Care EveryWhere ID. This could be used by other organizations to access your [place] medical records  [id number]        Your Vitals Were     Pulse Temperature Respirations Height Pulse Oximetry BMI (Body Mass Index)    94 98.2  F (36.8  C) (Temporal) 14 1.803 m (5' 11\") 98% 51.33 kg/m2       Blood Pressure from Last 3 Encounters:   02/19/18 122/78   05/26/17 112/85   02/14/17 130/79    Weight from Last 3 Encounters:   02/19/18 (!) 166.9 kg (368 lb)   02/20/17 (!) 172 kg (379 lb 1.6 oz)   02/13/17 (!) 166.9 kg (368 lb)              We Performed the Following     DEPRESSION ACTION PLAN (DAP)          Today's Medication Changes          These changes are accurate as of 2/19/18  6:26 PM.  If you have any questions, ask your nurse or doctor.               Start taking these medicines.        " Dose/Directions    aspirin 81 MG EC tablet   Used for:  Essential hypertension with goal blood pressure less than 130/85, Hyperlipidemia, unspecified hyperlipidemia type, Morbid obesity (H)   Started by:  Erika Brian MD        Dose:  81 mg   Take 1 tablet (81 mg) by mouth daily   Quantity:  90 tablet   Refills:  3         These medicines have changed or have updated prescriptions.        Dose/Directions    atorvastatin 10 MG tablet   Commonly known as:  LIPITOR   This may have changed:  See the new instructions.   Used for:  Hyperlipidemia, unspecified hyperlipidemia type   Changed by:  Erika Brian MD        Dose:  10 mg   Take 1 tablet (10 mg) by mouth daily   Quantity:  90 tablet   Refills:  3       lisinopril-hydrochlorothiazide 10-12.5 MG per tablet   Commonly known as:  PRINZIDE/ZESTORETIC   This may have changed:  See the new instructions.   Used for:  Essential hypertension with goal blood pressure less than 130/85   Changed by:  Erika Brian MD        Dose:  1 tablet   Take 1 tablet by mouth daily   Quantity:  90 tablet   Refills:  3         Stop taking these medicines if you haven't already. Please contact your care team if you have questions.     ondansetron 4 MG tablet   Commonly known as:  ZOFRAN   Stopped by:  Erika Brian MD           oxyCODONE-acetaminophen 5-325 MG per tablet   Commonly known as:  PERCOCET   Stopped by:  Erika Brian MD                Where to get your medicines      Some of these will need a paper prescription and others can be bought over the counter.  Ask your nurse if you have questions.     Bring a paper prescription for each of these medications     atorvastatin 10 MG tablet    lisinopril-hydrochlorothiazide 10-12.5 MG per tablet    tamsulosin 0.4 MG capsule       You don't need a prescription for these medications     aspirin 81 MG EC tablet                Primary Care Provider Office Phone # Fax #    Erika Brian [phone]        [address] [facility] [address]        Equal Access to Services     GIACOMO PEACOCK : Hadii fleicia Guerra, wahema perez, qasekou coronelmaflor perales, nidhi brown. So [facility] [phone].    ATENCIÓN: Si habla español, tiene a irizarry disposición servicios gratuitos de asistencia lingüística. Llame al [phone].    We comply with applicable federal civil rights laws and [place] laws. We do not discriminate on the basis of race, color, national origin, age, disability, sex, sexual orientation, or gender identity.            Thank you!     Thank you for choosing [facility]  for your care. Our goal is always to provide you with excellent care. Hearing back from our patients is one way we can continue to improve our services. Please take a few minutes to complete the written survey that you may receive in the mail after your visit with us. Thank you!             Your Updated Medication List - Protect others around you: Learn how to safely use, store and throw away your medicines at www.disposemymeds.org.          This list is accurate as of 2/19/18  6:26 PM.  Always use your most recent med list.                   Brand Name Dispense Instructions for use Diagnosis    aspirin 81 MG EC tablet     90 tablet    Take 1 tablet (81 mg) by mouth daily    Essential hypertension with goal blood pressure less than 130/85, Hyperlipidemia, unspecified hyperlipidemia type, Morbid obesity (H)       atorvastatin 10 MG tablet    LIPITOR    90 tablet    Take 1 tablet (10 mg) by mouth daily    Hyperlipidemia, unspecified hyperlipidemia type       IBUPROFEN PO      Take 600 mg by mouth daily Reported on 2/13/2017        lisinopril-hydrochlorothiazide 10-12.5 MG per tablet    PRINZIDE/ZESTORETIC    90 tablet    Take 1 tablet by mouth daily    Essential hypertension with goal blood pressure less than 130/85       omeprazole 40 MG capsule    priLOSEC     Take 40 mg by mouth as needed  Reported on 2/13/2017        * order for DME     1 Device    Auto CPAP @@ 5-15 cm with heated humidifier via nasal mask.    Sleep apnea, unspecified type       * order for DME     1 Device    Equipment being ordered: cpap supplies -- tubing with humidity and nasal mask    NASIR (obstructive sleep apnea)       PROAIR  (90 BASE) MCG/ACT Inhaler   Generic drug:  albuterol           tamsulosin 0.4 MG capsule    FLOMAX    90 capsule    Take 1 capsule (0.4 mg) by mouth daily    Essential hypertension with goal blood pressure less than 130/85       TYLENOL PO      Take 1,000 mg by mouth daily Reported on 2/13/2017        * Notice:  This list has 2 medication(s) that are the same as other medications prescribed for you. Read the directions carefully, and ask your doctor or other care provider to review them with you.

## 2018-02-19 NOTE — LETTER
My Depression Action Plan  Name: Frankie Gomez   Date of Birth 1980  Date: 2/9/2018    My doctor: Erika Brian   My clinic: [address]  [facility] [address] [phone]          GREEN    ZONE   Good Control    What it looks like:     Things are going generally well. You have normal up s and down s. You may even feel depressed from time to time, but bad moods usually last less than a day.   What you need to do:  1. Continue to care for yourself (see self care plan)  2. Check your depression survival kit and update it as needed  3. Follow your physician s recommendations including any medication.  4. Do not stop taking medication unless you consult with your physician first.           YELLOW         ZONE Getting Worse    What it looks like:     Depression is starting to interfere with your life.     It may be hard to get out of bed; you may be starting to isolate yourself from others.    Symptoms of depression are starting to last most all day and this has happened for several days.     You may have suicidal thoughts but they are not constant.   What you need to do:     1. Call your care team, your response to treatment will improve if you keep your care team informed of your progress. Yellow periods are signs an adjustment may need to be made.     2. Continue your self-care, even if you have to fake it!    3. Talk to someone in your support network    4. Open up your depression survival kit           RED    ZONE Medical Alert - Get Help    What it looks like:     Depression is seriously interfering with your life.     You may experience these or other symptoms: You can t get out of bed most days, can t work or engage in other necessary activities, you have trouble taking care of basic hygiene, or basic responsibilities, thoughts of suicide or death that will not go away, self-injurious behavior.     What you need to do:  1. Call your care team and  request a same-day appointment. If they are not available (weekends or after hours) call your local crisis line, emergency room or 911.      Electronically signed by: Olive Onofre, February 9, 2018    Depression Self Care Plan / Survival Kit    Self-Care for Depression  Here s the deal. Your body and mind are really not as separate as most people think.  What you do and think affects how you feel and how you feel influences what you do and think. This means if you do things that people who feel good do, it will help you feel better.  Sometimes this is all it takes.  There is also a place for medication and therapy depending on how severe your depression is, so be sure to consult with your medical provider and/ or Behavioral Health Consultant if your symptoms are worsening or not improving.     In order to better manage my stress, I will:    Exercise  Get some form of exercise, every day. This will help reduce pain and release endorphins, the  feel good  chemicals in your brain. This is almost as good as taking antidepressants!  This is not the same as joining a gym and then never going! (they count on that by the way ) It can be as simple as just going for a walk or doing some gardening, anything that will get you moving.      Hygiene   Maintain good hygiene (Get out of bed in the morning, Make your bed, Brush your teeth, Take a shower, and Get dressed like you were going to work, even if you are unemployed).  If your clothes don't fit try to get ones that do.    Diet  I will strive to eat foods that are good for me, drink plenty of water, and avoid excessive sugar, caffeine, alcohol, and other mood-altering substances.  Some foods that are helpful in depression are: complex carbohydrates, B vitamins, flaxseed, fish or fish oil, fresh fruits and vegetables.    Psychotherapy  I agree to participate in Individual Therapy (if recommended).    Medication  If prescribed medications, I agree to take them.  Missing  doses can result in serious side effects.  I understand that drinking alcohol, or other illicit drug use, may cause potential side effects.  I will not stop my medication abruptly without first discussing it with my provider.    Staying Connected With Others  I will stay in touch with my friends, family members, and my primary care provider/team.    Use your imagination  Be creative.  We all have a creative side; it doesn t matter if it s oil painting, sand castles, or mud pies! This will also kick up the endorphins.    Witness Beauty  (AKA stop and smell the roses) Take a look outside, even in mid-winter. Notice colors, textures. Watch the squirrels and birds.     Service to others  Be of service to others.  There is always someone else in need.  By helping others we can  get out of ourselves  and remember the really important things.  This also provides opportunities for practicing all the other parts of the program.    Humor  Laugh and be silly!  Adjust your TV habits for less news and crime-drama and more comedy.    Control your stress  Try breathing deep, massage therapy, biofeedback, and meditation. Find time to relax each day.     My support system    Clinic Contact:  Phone number:    Contact 1:  Phone number:    Contact 2:  Phone number:    Gnosticist/:  Phone number:    Therapist:  Phone number:    Local crisis center:    Phone number:    Other community support:  Phone number:

## 2018-02-20 ASSESSMENT — PATIENT HEALTH QUESTIONNAIRE - PHQ9: SUM OF ALL RESPONSES TO PHQ QUESTIONS 1-9: 8

## 2018-02-20 ASSESSMENT — ASTHMA QUESTIONNAIRES: ACT_TOTALSCORE: 19

## 2018-02-20 ASSESSMENT — ANXIETY QUESTIONNAIRES: GAD7 TOTAL SCORE: 1

## 2018-02-21 DIAGNOSIS — I10 ESSENTIAL HYPERTENSION WITH GOAL BLOOD PRESSURE LESS THAN 130/85: ICD-10-CM

## 2018-02-21 DIAGNOSIS — E78.5 HYPERLIPIDEMIA, UNSPECIFIED HYPERLIPIDEMIA TYPE: ICD-10-CM

## 2018-02-21 DIAGNOSIS — E66.01 MORBID OBESITY (H): ICD-10-CM

## 2018-02-21 LAB
ANION GAP SERPL CALCULATED.3IONS-SCNC: 8 MMOL/L (ref 3–14)
BUN SERPL-MCNC: 18 MG/DL (ref 7–30)
CALCIUM SERPL-MCNC: 8.3 MG/DL (ref 8.5–10.1)
CHLORIDE SERPL-SCNC: 104 MMOL/L (ref 94–109)
CHOLEST SERPL-MCNC: 149 MG/DL
CO2 SERPL-SCNC: 26 MMOL/L (ref 20–32)
CREAT SERPL-MCNC: 0.79 MG/DL (ref 0.66–1.25)
GFR SERPL CREATININE-BSD FRML MDRD: >90 ML/MIN/1.7M2
GLUCOSE SERPL-MCNC: 106 MG/DL (ref 70–99)
HDLC SERPL-MCNC: 42 MG/DL
LDLC SERPL CALC-MCNC: 88 MG/DL
NONHDLC SERPL-MCNC: 107 MG/DL
POTASSIUM SERPL-SCNC: 3.5 MMOL/L (ref 3.4–5.3)
SODIUM SERPL-SCNC: 138 MMOL/L (ref 133–144)
TRIGL SERPL-MCNC: 95 MG/DL

## 2018-02-21 PROCEDURE — 80048 BASIC METABOLIC PNL TOTAL CA: CPT | Performed by: FAMILY MEDICINE

## 2018-02-21 PROCEDURE — 36415 COLL VENOUS BLD VENIPUNCTURE: CPT | Performed by: FAMILY MEDICINE

## 2018-02-21 PROCEDURE — 80061 LIPID PANEL: CPT | Performed by: FAMILY MEDICINE

## 2018-02-21 NOTE — PROGRESS NOTES
Frankie, your results were all normal.    Please let me know if you have any questions.    Erika Brian MD

## 2018-11-19 ENCOUNTER — TRANSFERRED RECORDS (OUTPATIENT)
Dept: HEALTH INFORMATION MANAGEMENT | Facility: CLINIC | Age: 38
End: 2018-11-19

## 2018-12-07 ENCOUNTER — TRANSFERRED RECORDS (OUTPATIENT)
Dept: HEALTH INFORMATION MANAGEMENT | Facility: CLINIC | Age: 38
End: 2018-12-07

## 2019-03-28 ENCOUNTER — TELEPHONE (OUTPATIENT)
Dept: FAMILY MEDICINE | Facility: OTHER | Age: 39
End: 2019-03-28

## 2019-03-28 DIAGNOSIS — I10 ESSENTIAL HYPERTENSION WITH GOAL BLOOD PRESSURE LESS THAN 130/85: ICD-10-CM

## 2019-03-28 RX ORDER — TAMSULOSIN HYDROCHLORIDE 0.4 MG/1
CAPSULE ORAL
Qty: 30 CAPSULE | Refills: 0 | Status: SHIPPED | OUTPATIENT
Start: 2019-03-28 | End: 2019-07-30

## 2019-03-28 RX ORDER — LISINOPRIL/HYDROCHLOROTHIAZIDE 10-12.5 MG
TABLET ORAL
Qty: 30 TABLET | Refills: 0 | Status: SHIPPED | OUTPATIENT
Start: 2019-03-28 | End: 2019-07-30

## 2019-03-28 NOTE — TELEPHONE ENCOUNTER
Patient informed. He will call back to schedule, as he doesn't know when he is able to come in yet.

## 2019-03-28 NOTE — TELEPHONE ENCOUNTER
Flomax & Prinzide  Medication is being filled for 1 time refill only due to:  Patient needs to be seen because it has been more than one year since last visit.    Edie Nayak, RN, BSN

## 2019-03-29 DIAGNOSIS — E78.5 HYPERLIPIDEMIA, UNSPECIFIED HYPERLIPIDEMIA TYPE: ICD-10-CM

## 2019-03-29 RX ORDER — ATORVASTATIN CALCIUM 10 MG/1
TABLET, FILM COATED ORAL
Qty: 30 TABLET | Refills: 0 | Status: SHIPPED | OUTPATIENT
Start: 2019-03-29 | End: 2019-07-30

## 2019-03-29 NOTE — TELEPHONE ENCOUNTER
Lipitor  Medication is being filled for 1 time refill only due to:  Patient needs to be seen because it has been more than one year since last visit.   Outreach was attempted yesterday      Edie Nayak, RN, BSN

## 2019-05-13 ENCOUNTER — OFFICE VISIT (OUTPATIENT)
Dept: FAMILY MEDICINE | Facility: OTHER | Age: 39
End: 2019-05-13
Payer: COMMERCIAL

## 2019-05-13 VITALS
WEIGHT: 315 LBS | HEART RATE: 80 BPM | BODY MASS INDEX: 45.1 KG/M2 | TEMPERATURE: 98 F | RESPIRATION RATE: 18 BRPM | OXYGEN SATURATION: 97 % | HEIGHT: 70 IN

## 2019-05-13 DIAGNOSIS — L23.7 CONTACT DERMATITIS DUE TO POISON IVY: ICD-10-CM

## 2019-05-13 DIAGNOSIS — L08.9 LOCAL INFECTION OF SKIN AND SUBCUTANEOUS TISSUE: Primary | ICD-10-CM

## 2019-05-13 PROCEDURE — 99213 OFFICE O/P EST LOW 20 MIN: CPT | Performed by: NURSE PRACTITIONER

## 2019-05-13 RX ORDER — CEPHALEXIN 500 MG/1
500 CAPSULE ORAL 2 TIMES DAILY
Qty: 20 CAPSULE | Refills: 0 | Status: SHIPPED | OUTPATIENT
Start: 2019-05-13 | End: 2019-07-30

## 2019-05-13 RX ORDER — PREDNISONE 20 MG/1
20 TABLET ORAL 2 TIMES DAILY
Qty: 14 TABLET | Refills: 0 | Status: SHIPPED | OUTPATIENT
Start: 2019-05-13 | End: 2019-07-30

## 2019-05-13 RX ORDER — PREDNISONE 20 MG/1
20 TABLET ORAL 2 TIMES DAILY
Qty: 10 TABLET | Refills: 0 | Status: SHIPPED | OUTPATIENT
Start: 2019-05-13 | End: 2019-05-13

## 2019-05-13 ASSESSMENT — PATIENT HEALTH QUESTIONNAIRE - PHQ9
SUM OF ALL RESPONSES TO PHQ QUESTIONS 1-9: 3
SUM OF ALL RESPONSES TO PHQ QUESTIONS 1-9: 3
10. IF YOU CHECKED OFF ANY PROBLEMS, HOW DIFFICULT HAVE THESE PROBLEMS MADE IT FOR YOU TO DO YOUR WORK, TAKE CARE OF THINGS AT HOME, OR GET ALONG WITH OTHER PEOPLE: NOT DIFFICULT AT ALL

## 2019-05-13 ASSESSMENT — ASTHMA QUESTIONNAIRES
QUESTION_4 LAST FOUR WEEKS HOW OFTEN HAVE YOU USED YOUR RESCUE INHALER OR NEBULIZER MEDICATION (SUCH AS ALBUTEROL): NOT AT ALL
QUESTION_1 LAST FOUR WEEKS HOW MUCH OF THE TIME DID YOUR ASTHMA KEEP YOU FROM GETTING AS MUCH DONE AT WORK, SCHOOL OR AT HOME: NONE OF THE TIME
QUESTION_5 LAST FOUR WEEKS HOW WOULD YOU RATE YOUR ASTHMA CONTROL: COMPLETELY CONTROLLED
QUESTION_3 LAST FOUR WEEKS HOW OFTEN DID YOUR ASTHMA SYMPTOMS (WHEEZING, COUGHING, SHORTNESS OF BREATH, CHEST TIGHTNESS OR PAIN) WAKE YOU UP AT NIGHT OR EARLIER THAN USUAL IN THE MORNING: NOT AT ALL
QUESTION_2 LAST FOUR WEEKS HOW OFTEN HAVE YOU HAD SHORTNESS OF BREATH: THREE TO SIX TIMES A WEEK
ACT_TOTALSCORE: 23
ACUTE_EXACERBATION_TODAY: NO

## 2019-05-13 ASSESSMENT — ANXIETY QUESTIONNAIRES
GAD7 TOTAL SCORE: 2
7. FEELING AFRAID AS IF SOMETHING AWFUL MIGHT HAPPEN: NOT AT ALL
GAD7 TOTAL SCORE: 2
GAD7 TOTAL SCORE: 2
6. BECOMING EASILY ANNOYED OR IRRITABLE: NOT AT ALL
2. NOT BEING ABLE TO STOP OR CONTROL WORRYING: NOT AT ALL
5. BEING SO RESTLESS THAT IT IS HARD TO SIT STILL: NOT AT ALL
3. WORRYING TOO MUCH ABOUT DIFFERENT THINGS: SEVERAL DAYS
1. FEELING NERVOUS, ANXIOUS, OR ON EDGE: SEVERAL DAYS
7. FEELING AFRAID AS IF SOMETHING AWFUL MIGHT HAPPEN: NOT AT ALL
4. TROUBLE RELAXING: NOT AT ALL

## 2019-05-13 ASSESSMENT — MIFFLIN-ST. JEOR: SCORE: 2639.73

## 2019-05-13 NOTE — PROGRESS NOTES
SUBJECTIVE:   Frankie Gomez is a 38 year old male who presents to clinic today for the following health issues:  Answers for HPI/ROS submitted by the patient on 5/13/2019   If you checked off any problems, how difficult have these problems made it for you to do your work, take care of things at home, or get along with other people?: Not difficult at all  PHQ9 TOTAL SCORE: 3  MARISOL 7 TOTAL SCORE: 2      HPI  ED/UC Followup:    Facility:  [facility]   Date of visit: 5-6-19  Reason for visit: Poison Sumac   Current Status: Finished the prednisone but rash is still spreading    History of bad reactions to poison ivy. States he thought symptoms were improving with oral steroid but now are worsening. Waking up with drainage to multiple area on right forearm. Left arm has rash present as well and is now spreading on to chest area and hands.     Additional history: as documented    Reviewed and updated as needed this visit by clinical staff         Reviewed and updated as needed this visit by Provider         Patient Active Problem List   Diagnosis     Degeneration of lumbar or lumbosacral intervertebral disc     Pain in joint, forearm     Tobacco use disorder     Displacement of intervertebral disc, site unspecified, without myelopathy     CARDIOVASCULAR SCREENING; LDL GOAL LESS THAN 130     Major depressive disorder, single episode, moderate (H)     Lumbosacral neuritis     Heartburn     Morbid obesity (H)     Hypertension     Hyperlipidemia     Asthma     Fatty liver disease, nonalcoholic     Generalized anxiety disorder     Panic     Chronic low back pain     NASIR (obstructive sleep apnea)     SVT (supraventricular tachycardia) (H)     Diverticulosis of large intestine     External hemorrhoids     S/P laparoscopic cholecystectomy     Past Surgical History:   Procedure Laterality Date     APPENDECTOMY  07/25/09     COLONOSCOPY N/A 12/30/2016    Procedure: COMBINED COLONOSCOPY, SINGLE OR MULTIPLE BIOPSY/POLYPECTOMY  BY BIOPSY;  Surgeon: Rikki Jenkins MD;  Location: [facility]     ESOPHAGOSCOPY, GASTROSCOPY, DUODENOSCOPY (EGD), COMBINED N/A 2016    Procedure: COMBINED ESOPHAGOSCOPY, GASTROSCOPY, DUODENOSCOPY (EGD), BIOPSY SINGLE OR MULTIPLE;  Surgeon: Rikki Jenkins MD;  Location: [facility]     EXCISE MASS FOOT Right 2015    Procedure: EXCISE MASS FOOT;  Surgeon: Ramo Decker DPM;  Location: [facility]     HC COLONOSCOPY W SNARE REMOVAL TUMOR/POLYP/LESION       LAPAROSCOPIC CHOLECYSTECTOMY N/A 2017    Procedure: LAPAROSCOPIC CHOLECYSTECTOMY;  Surgeon: Toby Bishop MD;  Location: [facility]       Social History     Tobacco Use     Smoking status: Former Smoker     Packs/day: 0.75     Years: 13.00     Pack years: 9.75     Types: Cigarettes     Last attempt to quit: 4/15/2013     Years since quittin.0     Smokeless tobacco: Never Used     Tobacco comment: using electric cigarette   Substance Use Topics     Alcohol use: No     Family History   Problem Relation Age of Onset     Connective Tissue Disorder Mother         sjogrens     Depression Mother         on meds     Neurologic Disorder Mother         restless leg, migraines     Thyroid Disease Mother      C.A.D. Mother      Obesity Mother         Az-en-Y, snores     Obesity Father         snores     Cancer Paternal Grandmother      Obesity Paternal Grandfather          Current Outpatient Medications   Medication Sig Dispense Refill     Acetaminophen (TYLENOL PO) Take 1,000 mg by mouth daily Reported on 2017       aspirin 81 MG EC tablet Take 1 tablet (81 mg) by mouth daily 90 tablet 3     atorvastatin (LIPITOR) 10 MG tablet TAKE 1 TABLET BY MOUTH EVERY DAY 30 tablet 0     cephALEXin (KEFLEX) 500 MG capsule Take 1 capsule (500 mg) by mouth 2 times daily for 10 days 20 capsule 0     IBUPROFEN PO Take 600 mg by mouth daily Reported on 2017       lisinopril-hydrochlorothiazide (PRINZIDE/ZESTORETIC) 10-12.5 MG tablet TAKE 1 TABLET BY MOUTH EVERY  "DAY 30 tablet 0     omeprazole (PRILOSEC) 40 MG capsule Take 40 mg by mouth as needed Reported on 2/13/2017       order for DME Equipment being ordered: cpap supplies -- tubing with humidity and nasal mask 1 Device 0     order for DME Auto CPAP @@ 5-15 cm with heated humidifier via nasal mask. 1 Device 0     predniSONE (DELTASONE) 20 MG tablet Take 20 mg by mouth 2 times daily for 7 days. 14 tablet 0     PROAIR  (90 BASE) MCG/ACT inhaler        tamsulosin (FLOMAX) 0.4 MG capsule TAKE ONE CAPSULE BY MOUTH EVERY DAY 30 capsule 0     Allergies   Allergen Reactions     No Known Allergies      BP Readings from Last 3 Encounters:   02/19/18 122/78   05/26/17 112/85   02/14/17 130/79    Wt Readings from Last 3 Encounters:   05/13/19 (!) 170.1 kg (375 lb)   02/19/18 (!) 166.9 kg (368 lb)   02/20/17 (!) 172 kg (379 lb 1.6 oz)                  Labs reviewed in EPIC    ROS:  Constitutional, HEENT, cardiovascular, pulmonary, GI, , musculoskeletal, neuro, skin, endocrine and psych systems are negative, except as otherwise noted.    OBJECTIVE:     Pulse 80   Temp 98  F (36.7  C) (Temporal)   Resp 18   Ht 1.79 m (5' 10.47\")   Wt (!) 170.1 kg (375 lb)   SpO2 97%   BMI 53.09 kg/m    Body mass index is 53.09 kg/m .  GENERAL: healthy, alert and no distress  NECK: no adenopathy, no asymmetry, masses, or scars and thyroid normal to palpation  RESP: lungs clear to auscultation - no rales, rhonchi or wheezes  CV: regular rate and rhythm, normal S1 S2, no S3 or S4, no murmur, click or rub, no peripheral edema and peripheral pulses strong  MS: no gross musculoskeletal defects noted, no edema  SKIN: Examination of the rash reveals: Poison Ivy:multiple clusters of well-defined erythematous vesicles with clear drainage. Over right and left arm and scattered over chest and hands. Right arm increased swelling, dry today, erythema, warmth.     ASSESSMENT/PLAN:     1. Local infection of skin and subcutaneous tissue  Recommend oral " antibiotic for skin infection. Home instructions reviewed. Discussed risk and symptoms of sepsis related to infection and to return to clinic or ed if these symptoms should arise.     - cephALEXin (KEFLEX) 500 MG capsule; Take 1 capsule (500 mg) by mouth 2 times daily for 10 days  Dispense: 20 capsule; Refill: 0    2. Contact dermatitis due to poison ivy  Increased dosing of oral prednisone continue for 5-7 days   Will return to clinic if symptoms not improving   - predniSONE (DELTASONE) 20 MG tablet; Take 20 mg by mouth 2 times daily for 7 days.  Dispense: 14 tablet; Refill: 0    Patient Instructions       Patient Education     Managing a Poison Ivy, Poison Oak, or Poison Sumac Reaction  If you come in contact with urushiol    If you think you may have come in contact with the sap oil (called urushiol) contained in poison ivy, poison oak, or poison sumac plants, wash the affected part of your skin. Do this within 15 minutes after contact. Use water or preferably, soap and water.  Undress, and wash your clothes and gear as soon as you can. Be sure to wash any pet that was with you. Taking these steps can help prevent spreading sap oil to someone else. If you have a rash, but are not sure if it is from one of these plants, see your healthcare provider.  To soothe the itching  Your skin may react to poison oak, poison ivy, and poison sumac within hours to a few days after contact. Once you have come into contact with these plants, you can t stop the reaction. But you can take these steps to soothe the itching:    Don t scratch or scrub your rash. Not even if the itching is severe. Scratching can lead to infection.    Bathe in lukewarm (not hot) water. Or take short cool showers to relieve the itching. For a more soothing bath, add oatmeal to the water.    Use antihistamines that are taken by mouth. These include diphenhydramine. You can buy these at the pharmacy. Talk to your healthcare provider or pharmacist for more  information on oral antihistamines.    Use over-the-counter treatments on your skin. These include cortisone and calamine lotion.  How your skin may react  A mild rash may become red, swollen, and itchy. The rash may form a line on your skin where you brushed against the plant. If you have a severe rash, your itching may worsen. And your rash may blister and ooze. If this happens, seek medical care. The fluid from your blisters will not make your rash spread. With or without medical care, your rash may last up to 3 weeks. In the future, try to avoid coming in contact with these plants.  When to call your healthcare provider  Call your healthcare provider if:    Your rash is severe    The rash spreads beyond the exposed part of your body or affects your face.    The rash does not clear up within a few weeks  You may be given medicine to take by mouth or apply directly on the skin.     Call 911  Call 911 if you have any of the following:    Trouble breathing or swallowing    Any significant swelling   Date Last Reviewed: 3/1/2017    8658-3357 [employer]. [address] [place] [address]. All rights reserved. This information is not intended as a substitute for professional medical care. Always follow your healthcare professional's instructions.               TASHA Collier [facility]

## 2019-05-13 NOTE — LETTER
[facility]  [address] [facility] [address]  Phone: [phone]    May 13, 2019        Frankie Gomez  [address] [facility] [address]          To whom it may concern:    RE: Frankie Gomez    Patient was seen and treated today at our clinic and missed work may return to work when symptoms have improved.     Please contact me for questions or concerns.      Sincerely,        TASHA Collier CNP

## 2019-05-13 NOTE — PATIENT INSTRUCTIONS
Patient Education     Managing a Poison Ivy, Poison Oak, or Poison Sumac Reaction  If you come in contact with urushiol    If you think you may have come in contact with the sap oil (called urushiol) contained in poison ivy, poison oak, or poison sumac plants, wash the affected part of your skin. Do this within 15 minutes after contact. Use water or preferably, soap and water.  Undress, and wash your clothes and gear as soon as you can. Be sure to wash any pet that was with you. Taking these steps can help prevent spreading sap oil to someone else. If you have a rash, but are not sure if it is from one of these plants, see your healthcare provider.  To soothe the itching  Your skin may react to poison oak, poison ivy, and poison sumac within hours to a few days after contact. Once you have come into contact with these plants, you can t stop the reaction. But you can take these steps to soothe the itching:    Don t scratch or scrub your rash. Not even if the itching is severe. Scratching can lead to infection.    Bathe in lukewarm (not hot) water. Or take short cool showers to relieve the itching. For a more soothing bath, add oatmeal to the water.    Use antihistamines that are taken by mouth. These include diphenhydramine. You can buy these at the pharmacy. Talk to your healthcare provider or pharmacist for more information on oral antihistamines.    Use over-the-counter treatments on your skin. These include cortisone and calamine lotion.  How your skin may react  A mild rash may become red, swollen, and itchy. The rash may form a line on your skin where you brushed against the plant. If you have a severe rash, your itching may worsen. And your rash may blister and ooze. If this happens, seek medical care. The fluid from your blisters will not make your rash spread. With or without medical care, your rash may last up to 3 weeks. In the future, try to avoid coming in contact with these plants.  When to call your  healthcare provider  Call your healthcare provider if:    Your rash is severe    The rash spreads beyond the exposed part of your body or affects your face.    The rash does not clear up within a few weeks  You may be given medicine to take by mouth or apply directly on the skin.     Call 911  Call 911 if you have any of the following:    Trouble breathing or swallowing    Any significant swelling   Date Last Reviewed: 3/1/2017    1029-4927 [employer] [place]. [address] [place] [address], [place] [address]. All rights reserved. This information is not intended as a substitute for professional medical care. Always follow your healthcare professional's instructions.

## 2019-05-14 ASSESSMENT — PATIENT HEALTH QUESTIONNAIRE - PHQ9: SUM OF ALL RESPONSES TO PHQ QUESTIONS 1-9: 3

## 2019-05-14 ASSESSMENT — ANXIETY QUESTIONNAIRES: GAD7 TOTAL SCORE: 2

## 2019-05-14 ASSESSMENT — ASTHMA QUESTIONNAIRES: ACT_TOTALSCORE: 23

## 2019-07-25 NOTE — PROGRESS NOTES
SUBJECTIVE:   CC: Frankie Gomez is an 38 year old male who presents for preventative health visit.     Healthy Habits:     Getting at least 3 servings of Calcium per day:  NO    Bi-annual eye exam:  NO    Dental care twice a year:  NO    Sleep apnea or symptoms of sleep apnea:  Excessive snoring and Sleep apnea    Diet:  Regular (no restrictions)    Frequency of exercise:  None    Taking medications regularly:  Yes    Medication side effects:  None    PHQ-2 Total Score: 0    Additional concerns today:  No    Today's PHQ-2 Score:   PHQ-2 (  [employer]) 2016   Q1: Little interest or pleasure in doing things 2   Q2: Feeling down, depressed or hopeless 1   PHQ-2 Score 3       Abuse: Current or Past(Physical, Sexual or Emotional)- No  Do you feel safe in your environment? Yes    Social History     Tobacco Use     Smoking status: Former Smoker     Packs/day: 0.75     Years: 13.00     Pack years: 9.75     Types: Cigarettes     Last attempt to quit: 4/15/2013     Years since quittin.2     Smokeless tobacco: Never Used     Tobacco comment: using electric cigarette   Substance Use Topics     Alcohol use: No         No flowsheet data found.No flowsheet data found.    Last PSA:   PSA   Date Value Ref Range Status   2017 0.76 0 - 4 ug/L Final     Comment:     Assay Method:  Chemiluminescence using Siemens Vista analyzer       Reviewed orders with patient. Reviewed health maintenance and updated orders accordingly -   Lab work is in process  Labs reviewed in EPIC  BP Readings from Last 3 Encounters:   19 136/80   18 122/78   17 112/85    Wt Readings from Last 3 Encounters:   19 (!) 167.7 kg (369 lb 12.8 oz)   19 (!) 170.1 kg (375 lb)   18 (!) 166.9 kg (368 lb)                  Patient Active Problem List   Diagnosis     Degeneration of lumbar or lumbosacral intervertebral disc     Pain in joint, forearm     Tobacco use disorder     Displacement of intervertebral disc, site  unspecified, without myelopathy     CARDIOVASCULAR SCREENING; LDL GOAL LESS THAN 130     Major depressive disorder, single episode, moderate (H)     Lumbosacral neuritis     Heartburn     Morbid obesity (H)     Hypertension     Hyperlipidemia     Asthma     Fatty liver disease, nonalcoholic     Generalized anxiety disorder     Panic     Chronic low back pain     NASIR (obstructive sleep apnea)     SVT (supraventricular tachycardia) (H)     Diverticulosis of large intestine     External hemorrhoids     S/P laparoscopic cholecystectomy     Seasonal allergic rhinitis due to other allergic trigger     Post-nasal drip     Increased frequency of urination     Past Surgical History:   Procedure Laterality Date     APPENDECTOMY  09     COLONOSCOPY N/A 2016    Procedure: COMBINED COLONOSCOPY, SINGLE OR MULTIPLE BIOPSY/POLYPECTOMY BY BIOPSY;  Surgeon: Rikki Jenkins MD;  Location:  GI     ESOPHAGOSCOPY, GASTROSCOPY, DUODENOSCOPY (EGD), COMBINED N/A 2016    Procedure: COMBINED ESOPHAGOSCOPY, GASTROSCOPY, DUODENOSCOPY (EGD), BIOPSY SINGLE OR MULTIPLE;  Surgeon: Rikki Jenkins MD;  Location:  GI     EXCISE MASS FOOT Right 2015    Procedure: EXCISE MASS FOOT;  Surgeon: Ramo Decker DPM;  Location:  OR      COLONOSCOPY W SNARE REMOVAL TUMOR/POLYP/LESION       LAPAROSCOPIC CHOLECYSTECTOMY N/A 2017    Procedure: LAPAROSCOPIC CHOLECYSTECTOMY;  Surgeon: oTby Bishop MD;  Location:  OR       Social History     Tobacco Use     Smoking status: Former Smoker     Packs/day: 0.75     Years: 13.00     Pack years: 9.75     Types: Cigarettes     Last attempt to quit: 4/15/2013     Years since quittin.2     Smokeless tobacco: Never Used     Tobacco comment: using electric cigarette   Substance Use Topics     Alcohol use: No     Family History   Problem Relation Age of Onset     Connective Tissue Disorder Mother         sjogrens     Depression Mother         on meds     Neurologic  Disorder Mother         restless leg, migraines     Thyroid Disease Mother      C.A.D. Mother      Obesity Mother         Az-en-Y, snores     Obesity Father         snores     Cancer Paternal Grandmother      Obesity Paternal Grandfather          Current Outpatient Medications   Medication Sig Dispense Refill     Acetaminophen (TYLENOL PO) Take 1,000 mg by mouth daily Reported on 2/13/2017       aspirin 81 MG EC tablet Take 1 tablet (81 mg) by mouth daily 90 tablet 3     atorvastatin (LIPITOR) 10 MG tablet Take 1 tablet (10 mg) by mouth daily 90 tablet 1     IBUPROFEN PO Take 600 mg by mouth daily Reported on 2/13/2017       lisinopril-hydrochlorothiazide (PRINZIDE/ZESTORETIC) 10-12.5 MG tablet Take 1 tablet by mouth daily 90 tablet 1     omeprazole (PRILOSEC) 40 MG capsule Take 40 mg by mouth as needed Reported on 2/13/2017       order for DME Equipment being ordered: cpap supplies -- tubing with humidity and nasal mask 1 Device 0     order for DME Auto CPAP @@ 5-15 cm with heated humidifier via nasal mask. 1 Device 0     tamsulosin (FLOMAX) 0.4 MG capsule Take 1 capsule (0.4 mg) by mouth daily 90 capsule 1     PROAIR  (90 BASE) MCG/ACT inhaler        Allergies   Allergen Reactions     No Known Allergies        Reviewed and updated as needed this visit by clinical staff         Reviewed and updated as needed this visit by Provider        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     Unspecified sinusitis (chronic)       Past Surgical History:   Procedure Laterality Date      "APPENDECTOMY  07/25/09     COLONOSCOPY N/A 12/30/2016    Procedure: COMBINED COLONOSCOPY, SINGLE OR MULTIPLE BIOPSY/POLYPECTOMY BY BIOPSY;  Surgeon: Rikki Jenkins MD;  Location:  GI     ESOPHAGOSCOPY, GASTROSCOPY, DUODENOSCOPY (EGD), COMBINED N/A 12/30/2016    Procedure: COMBINED ESOPHAGOSCOPY, GASTROSCOPY, DUODENOSCOPY (EGD), BIOPSY SINGLE OR MULTIPLE;  Surgeon: Rikki Jenkins MD;  Location: [facility] GI     EXCISE MASS FOOT Right 2/13/2015    Procedure: EXCISE MASS FOOT;  Surgeon: Ramo Decker DPM;  Location: [facility] OR      COLONOSCOPY W SNARE REMOVAL TUMOR/POLYP/LESION  1998     LAPAROSCOPIC CHOLECYSTECTOMY N/A 2/13/2017    Procedure: LAPAROSCOPIC CHOLECYSTECTOMY;  Surgeon: Toby Bishop MD;  Location: [facility] OR       Review of Systems   Constitutional: Negative for chills and fever.   HENT: Negative for congestion, ear pain, hearing loss and sore throat.    Eyes: Negative for pain and visual disturbance.   Respiratory: Negative for cough and shortness of breath.    Cardiovascular: Negative for chest pain, palpitations and peripheral edema.   Gastrointestinal: Positive for hematochezia. Negative for abdominal pain, constipation, diarrhea, heartburn and nausea.   Genitourinary: Positive for impotence. Negative for discharge, dysuria, frequency, genital sores, hematuria and urgency.   Musculoskeletal: Negative for arthralgias, joint swelling and myalgias.   Skin: Negative for rash.   Neurological: Negative for dizziness, weakness, headaches and paresthesias.   Psychiatric/Behavioral: Negative for mood changes. The patient is nervous/anxious.      OBJECTIVE:   /80 (Cuff Size: Adult Large)   Pulse 76   Temp 97.6  F (36.4  C) (Temporal)   Resp 18   Ht 1.799 m (5' 10.83\")   Wt (!) 167.7 kg (369 lb 12.8 oz)   SpO2 97%   BMI 51.83 kg/m      Physical Exam  GENERAL: healthy, alert and no distress  HENT: ear canals and TM's normal, nose and mouth without ulcers or lesions  NECK: no adenopathy, no " asymmetry, masses, or scars and trachea midline and normal to palpation  RESP: lungs clear to auscultation - no rales, rhonchi or wheezes  CV: regular rate and rhythm, normal S1 S2, no S3 or S4, no murmur, click or rub, no peripheral edema and peripheral pulses strong  ABDOMEN: soft, nontender, no hepatosplenomegaly, no masses and bowel sounds normal  MS: no gross musculoskeletal defects noted, no edema  SKIN: no suspicious lesions or rashes to visible skin.  NEURO: Normal strength and tone, mentation intact and speech normal  PSYCH: mentation appears normal, affect normal/bright    Diagnostic Test Results:  Labs reviewed in Epic  No results found for this or any previous visit (from the past 24 hour(s)).    ASSESSMENT/PLAN:   1. Routine general medical examination at a health care facility  Screening labs as part of his routine physical today are encouraged.  - Lipid panel reflex to direct LDL Fasting  - CBC with platelets  - Comprehensive metabolic panel  - TSH with free T4 reflex    2. Major depressive disorder, single episode, moderate (H)  - Lipid panel reflex to direct LDL Fasting  - CBC with platelets  - Comprehensive metabolic panel  - TSH with free T4 reflex    3. SVT (supraventricular tachycardia) (H)  4. Morbid obesity (H)  5. NASIR (obstructive sleep apnea)  6. Generalized anxiety disorder  7. Seasonal allergic rhinitis due to other allergic trigger  8. Post-nasal drip  At this point time he denies any concerns related to his SVT in the past.  He still has morbid obesity and has not treated his obstructive sleep apnea at all.  Strongly recommended that he set up his CPAP machine as he has been instructed and use this on a regular basis as in my opinion this is the most important part of taking care of his overall health that he can do at this point time.  He denies any new concerns related to anxiety.  We talked about the fact that he certainly has some postnasal drip and some possible seasonal rhinitis.   "He has not treated this at all.  With the use of CPAP I highly recommend that he take an over-the-counter allergy medication like Zyrtec or Allegra as well as Flonase nasal spray and follow-up in 2 to 3 months.    9. External hemorrhoids  Historically noted he would like to speak with a surgeon about what his options are.  I placed a referral for this today.  He defers any examination to the surgeon.  - GENERAL SURG ADULT REFERRAL    10. Increased frequency of urination  He is been treated with tamsulosin in the past but is unaware whether or not this worked well for him.  Highly recommend that we get a PSA for him today check this further and then encouraged him to consider using the Flomax in the future.  - PSA, screen    11. Essential hypertension with goal blood pressure less than 130/85  Remarkable control given the fact that he is not on any medications for this at this point in time we will redo refill his medications as requested and do recommend that he take them on a regular basis.  - lisinopril-hydrochlorothiazide (PRINZIDE/ZESTORETIC) 10-12.5 MG tablet; Take 1 tablet by mouth daily  Dispense: 90 tablet; Refill: 1  - tamsulosin (FLOMAX) 0.4 MG capsule; Take 1 capsule (0.4 mg) by mouth daily  Dispense: 90 capsule; Refill: 1    12. Hyperlipidemia, unspecified hyperlipidemia type  Refilled medications as requested follow-up in 6 months.  - atorvastatin (LIPITOR) 10 MG tablet; Take 1 tablet (10 mg) by mouth daily  Dispense: 90 tablet; Refill: 1    COUNSELING:   Reviewed preventive health counseling, as reflected in patient instructions       Regular exercise       Healthy diet/nutrition       Vision screening       Hearing screening    Estimated body mass index is 53.09 kg/m  as calculated from the following:    Height as of 5/13/19: 1.79 m (5' 10.47\").    Weight as of 5/13/19: 170.1 kg (375 lb).     Weight management plan: Discussed healthy diet and exercise guidelines     reports that he quit smoking about " 6 years ago. His smoking use included cigarettes. He has a 9.75 pack-year smoking history. He has never used smokeless tobacco.      Counseling Resources:  ATP IV Guidelines  Pooled Cohorts Equation Calculator  FRAX Risk Assessment  ICSI Preventive Guidelines  Dietary Guidelines for Americans, 2010  USDA's MyPlate  ASA Prophylaxis  Lung CA Screening    Alvin Garcia PA-C  [facility]

## 2019-07-30 ENCOUNTER — OFFICE VISIT (OUTPATIENT)
Dept: FAMILY MEDICINE | Facility: OTHER | Age: 39
End: 2019-07-30
Payer: COMMERCIAL

## 2019-07-30 VITALS
HEIGHT: 71 IN | BODY MASS INDEX: 44.1 KG/M2 | WEIGHT: 315 LBS | TEMPERATURE: 97.6 F | OXYGEN SATURATION: 97 % | RESPIRATION RATE: 18 BRPM | HEART RATE: 76 BPM | DIASTOLIC BLOOD PRESSURE: 80 MMHG | SYSTOLIC BLOOD PRESSURE: 136 MMHG

## 2019-07-30 DIAGNOSIS — E78.5 HYPERLIPIDEMIA, UNSPECIFIED HYPERLIPIDEMIA TYPE: ICD-10-CM

## 2019-07-30 DIAGNOSIS — K64.4 EXTERNAL HEMORRHOIDS: ICD-10-CM

## 2019-07-30 DIAGNOSIS — G47.33 OSA (OBSTRUCTIVE SLEEP APNEA): ICD-10-CM

## 2019-07-30 DIAGNOSIS — I10 ESSENTIAL HYPERTENSION WITH GOAL BLOOD PRESSURE LESS THAN 130/85: ICD-10-CM

## 2019-07-30 DIAGNOSIS — Z00.00 ROUTINE GENERAL MEDICAL EXAMINATION AT A HEALTH CARE FACILITY: Primary | ICD-10-CM

## 2019-07-30 DIAGNOSIS — F32.1 MAJOR DEPRESSIVE DISORDER, SINGLE EPISODE, MODERATE (H): ICD-10-CM

## 2019-07-30 DIAGNOSIS — R35.0 INCREASED FREQUENCY OF URINATION: ICD-10-CM

## 2019-07-30 DIAGNOSIS — J30.89 SEASONAL ALLERGIC RHINITIS DUE TO OTHER ALLERGIC TRIGGER: ICD-10-CM

## 2019-07-30 DIAGNOSIS — I47.10 SVT (SUPRAVENTRICULAR TACHYCARDIA) (H): ICD-10-CM

## 2019-07-30 DIAGNOSIS — R09.82 POST-NASAL DRIP: ICD-10-CM

## 2019-07-30 DIAGNOSIS — E66.01 MORBID OBESITY (H): ICD-10-CM

## 2019-07-30 DIAGNOSIS — F41.1 GENERALIZED ANXIETY DISORDER: ICD-10-CM

## 2019-07-30 LAB
ALBUMIN SERPL-MCNC: 3.8 G/DL (ref 3.4–5)
ALP SERPL-CCNC: 93 U/L (ref 40–150)
ALT SERPL W P-5'-P-CCNC: 27 U/L (ref 0–70)
ANION GAP SERPL CALCULATED.3IONS-SCNC: 3 MMOL/L (ref 3–14)
AST SERPL W P-5'-P-CCNC: 18 U/L (ref 0–45)
BILIRUB SERPL-MCNC: 0.6 MG/DL (ref 0.2–1.3)
BUN SERPL-MCNC: 17 MG/DL (ref 7–30)
CALCIUM SERPL-MCNC: 8.7 MG/DL (ref 8.5–10.1)
CHLORIDE SERPL-SCNC: 110 MMOL/L (ref 94–109)
CHOLEST SERPL-MCNC: 175 MG/DL
CO2 SERPL-SCNC: 28 MMOL/L (ref 20–32)
CREAT SERPL-MCNC: 0.73 MG/DL (ref 0.66–1.25)
ERYTHROCYTE [DISTWIDTH] IN BLOOD BY AUTOMATED COUNT: 13.4 % (ref 10–15)
GFR SERPL CREATININE-BSD FRML MDRD: >90 ML/MIN/{1.73_M2}
GLUCOSE SERPL-MCNC: 99 MG/DL (ref 70–99)
HCT VFR BLD AUTO: 42.8 % (ref 40–53)
HDLC SERPL-MCNC: 37 MG/DL
HGB BLD-MCNC: 13.8 G/DL (ref 13.3–17.7)
LDLC SERPL CALC-MCNC: 108 MG/DL
MCH RBC QN AUTO: 30.7 PG (ref 26.5–33)
MCHC RBC AUTO-ENTMCNC: 32.2 G/DL (ref 31.5–36.5)
MCV RBC AUTO: 95 FL (ref 78–100)
NONHDLC SERPL-MCNC: 138 MG/DL
PLATELET # BLD AUTO: 246 10E9/L (ref 150–450)
POTASSIUM SERPL-SCNC: 4 MMOL/L (ref 3.4–5.3)
PROT SERPL-MCNC: 7.6 G/DL (ref 6.8–8.8)
PSA SERPL-ACNC: 0.94 UG/L (ref 0–4)
RBC # BLD AUTO: 4.49 10E12/L (ref 4.4–5.9)
SODIUM SERPL-SCNC: 141 MMOL/L (ref 133–144)
TRIGL SERPL-MCNC: 151 MG/DL
TSH SERPL DL<=0.005 MIU/L-ACNC: 1.86 MU/L (ref 0.4–4)
WBC # BLD AUTO: 7.5 10E9/L (ref 4–11)

## 2019-07-30 PROCEDURE — 85027 COMPLETE CBC AUTOMATED: CPT | Performed by: PHYSICIAN ASSISTANT

## 2019-07-30 PROCEDURE — G0103 PSA SCREENING: HCPCS | Performed by: PHYSICIAN ASSISTANT

## 2019-07-30 PROCEDURE — 80053 COMPREHEN METABOLIC PANEL: CPT | Performed by: PHYSICIAN ASSISTANT

## 2019-07-30 PROCEDURE — 84443 ASSAY THYROID STIM HORMONE: CPT | Performed by: PHYSICIAN ASSISTANT

## 2019-07-30 PROCEDURE — 99395 PREV VISIT EST AGE 18-39: CPT | Performed by: PHYSICIAN ASSISTANT

## 2019-07-30 PROCEDURE — 36415 COLL VENOUS BLD VENIPUNCTURE: CPT | Performed by: PHYSICIAN ASSISTANT

## 2019-07-30 PROCEDURE — 80061 LIPID PANEL: CPT | Performed by: PHYSICIAN ASSISTANT

## 2019-07-30 PROCEDURE — 99213 OFFICE O/P EST LOW 20 MIN: CPT | Mod: 25 | Performed by: PHYSICIAN ASSISTANT

## 2019-07-30 RX ORDER — TAMSULOSIN HYDROCHLORIDE 0.4 MG/1
0.4 CAPSULE ORAL DAILY
Qty: 90 CAPSULE | Refills: 1 | Status: SHIPPED | OUTPATIENT
Start: 2019-07-30 | End: 2020-12-31

## 2019-07-30 RX ORDER — ATORVASTATIN CALCIUM 10 MG/1
10 TABLET, FILM COATED ORAL DAILY
Qty: 90 TABLET | Refills: 1 | Status: SHIPPED | OUTPATIENT
Start: 2019-07-30 | End: 2020-12-31

## 2019-07-30 RX ORDER — LISINOPRIL/HYDROCHLOROTHIAZIDE 10-12.5 MG
1 TABLET ORAL DAILY
Qty: 90 TABLET | Refills: 1 | Status: SHIPPED | OUTPATIENT
Start: 2019-07-30 | End: 2020-12-31 | Stop reason: ALTCHOICE

## 2019-07-30 ASSESSMENT — ENCOUNTER SYMPTOMS
DYSURIA: 0
HEMATURIA: 0
ABDOMINAL PAIN: 0
SORE THROAT: 0
PARESTHESIAS: 0
FEVER: 0
WEAKNESS: 0
COUGH: 0
HEADACHES: 0
HEARTBURN: 0
PALPITATIONS: 0
CHILLS: 0
DIARRHEA: 0
SHORTNESS OF BREATH: 0
NERVOUS/ANXIOUS: 1
FREQUENCY: 0
HEMATOCHEZIA: 1
DIZZINESS: 0
JOINT SWELLING: 0
NAUSEA: 0
EYE PAIN: 0
ARTHRALGIAS: 0
CONSTIPATION: 0
MYALGIAS: 0

## 2019-07-30 ASSESSMENT — PAIN SCALES - GENERAL: PAINLEVEL: NO PAIN (0)

## 2019-07-30 ASSESSMENT — MIFFLIN-ST. JEOR: SCORE: 2621.78

## 2019-07-30 NOTE — LETTER
My Asthma Action Plan  Name: Frankie Gomez   YOB: 1980  Date: 7/30/2019   My doctor: Alvin Garcia PA-C   My clinic: [facility]        My Control Medicine: None  My Rescue Medicine: Albuterol (Proair/Ventolin/Proventil) inhaler as directed   My Asthma Severity: intermittent  Avoid your asthma triggers: upper respiratory infections  upper respiratory infections          GREEN ZONE   Good Control    I feel good    No cough or wheeze    Can work, sleep and play without asthma symptoms       Take your asthma control medicine every day.     1. If exercise triggers your asthma, take your rescue medication    15 minutes before exercise or sports, and    During exercise if you have asthma symptoms  2. Spacer to use with inhaler: If you have a spacer, make sure to use it with your inhaler             YELLOW ZONE Getting Worse  I have ANY of these:    I do not feel good    Cough or wheeze    Chest feels tight    Wake up at night   1. Keep taking your Green Zone medications  2. Start taking your rescue medicine:    every 20 minutes for up to 1 hour. Then every 4 hours for 24-48 hours.  3. If you stay in the Yellow Zone for more than 12-24 hours, contact your doctor.  4. If you do not return to the Green Zone in 12-24 hours or you get worse, start taking your oral steroid medicine if prescribed by your provider.           RED ZONE Medical Alert - Get Help  I have ANY of these:    I feel awful    Medicine is not helping    Breathing getting harder    Trouble walking or talking    Nose opens wide to breathe       1. Take your rescue medicine NOW  2. If your provider has prescribed an oral steroid medicine, start taking it NOW  3. Call your doctor NOW  4. If you are still in the Red Zone after 20 minutes and you have not reached your doctor:    Take your rescue medicine again and    Call 911 or go to the emergency room right away    See your regular doctor within 2 weeks of an Emergency Room or  Urgent Care visit for follow-up treatment.          Annual Reminders:  Meet with Asthma Educator,  Flu Shot in the Fall, consider Pneumonia Vaccination for patients with asthma (aged 19 and older).    Pharmacy:    [facility] [place] [address] [facility] [place] - [address]  [facility] [address] [id number] - [place] - [address] [facility] [address] [facility] [address]  [id number] - [place], [place] - [address] [facility] AT [facility]                      Asthma Triggers  How To Control Things That Make Your Asthma Worse    Triggers are things that make your asthma worse.  Look at the list below to help you find your triggers and what you can do about them.  You can help prevent asthma flare-ups by staying away from your triggers.      Trigger                                                          What you can do   Cigarette Smoke  Tobacco smoke can make asthma worse. Do not allow smoking in your home, car or around you.  Be sure no one smokes at a child s day care or school.  If you smoke, ask your health care provider for ways to help you quit.  Ask family members to quit too.  Ask your health care provider for a referral to Quit Plan to help you quit smoking, or call 6-565-282-PLAN.     Colds, Flu, Bronchitis  These are common triggers of asthma. Wash your hands often.  Don t touch your eyes, nose or mouth.  Get a flu shot every year.     Dust Mites  These are tiny bugs that live in cloth or carpet. They are too small to see. Wash sheets and blankets in hot water every week.   Encase pillows and mattress in dust mite proof covers.  Avoid having carpet if you can. If you have carpet, vacuum weekly.   Use a dust mask and HEPA vacuum.   Pollen and Outdoor Mold  Some people are allergic to trees, grass, or weed pollen, or molds. Try  to keep your windows closed.  Limit time out doors when pollen count is high.   Ask you health care provider about taking medicine during allergy season.     Animal Dander  Some people are allergic to skin flakes, urine or saliva from pets with fur or feathers. Keep pets with fur or feathers out of your home.    If you can t keep the pet outdoors, then keep the pet out of your bedroom.  Keep the bedroom door closed.  Keep pets off cloth furniture and away from stuffed toys.     Mice, Rats, and Cockroaches  Some people are allergic to the waste from these pests.   Cover food and garbage.  Clean up spills and food crumbs.  Store grease in the refrigerator.   Keep food out of the bedroom.   Indoor Mold  This can be a trigger if your home has high moisture. Fix leaking faucets, pipes, or other sources of water.   Clean moldy surfaces.  Dehumidify basement if it is damp and smelly.   Smoke, Strong Odors, and Sprays  These can reduce air quality. Stay away from strong odors and sprays, such as perfume, powder, hair spray, paints, smoke incense, paint, cleaning products, candles and new carpet.   Exercise or Sports  Some people with asthma have this trigger. Be active!  Ask your doctor about taking medicine before sports or exercise to prevent symptoms.    Warm up for 5-10 minutes before and after sports or exercise.     Other Triggers of Asthma  Cold air:  Cover your nose and mouth with a scarf.  Sometimes laughing or crying can be a trigger.  Some medicines and food can trigger asthma.

## 2019-07-31 ASSESSMENT — ASTHMA QUESTIONNAIRES: ACT_TOTALSCORE: 25

## 2019-08-15 NOTE — PROGRESS NOTES
"Subjective     Frankie Gomez is a 38 year old male who presents to clinic today for the following health issues:    HPI   Rash  Onset: 1 weel    Description:   Location: abdomen  Character: raised, painful, burning, draining, red  Itching (Pruritis): YES- has been itching    Progression of Symptoms:  worsening    Accompanying Signs & Symptoms:  Fever: no   Body aches or joint pain: YES  Sore throat symptoms: no   Recent cold symptoms: no     History:   Previous similar rash: YES- Has had poison ivy before    Precipitating factors:   Exposure to similar rash: no   New exposures: None   Recent travel: no     Alleviating factors:  None    Feels achy, tired and nauseated.      Therapies Tried and outcome: anti-itch ointment    Seems to get poison ivy quite frequently.  Last had in May.  Also has had cellulitis with this in past.  He is feeling nauseated, aching, fatigued this morning.      Reviewed and updated as needed this visit by Provider  Tobacco  Allergies  Meds  Problems  Med Hx  Surg Hx  Fam Hx         Review of Systems   ROS COMP: Constitutional, HEENT, cardiovascular, pulmonary, gi and gu systems are negative, except as otherwise noted.      Objective    /74   Pulse 72   Temp 99.3  F (37.4  C) (Temporal)   Resp 16   Ht 1.799 m (5' 10.83\")   Wt (!) 164.2 kg (362 lb)   BMI 50.74 kg/m    Body mass index is 50.74 kg/m .  Physical Exam   GENERAL: healthy, alert and no distress  NECK: no adenopathy, no asymmetry, masses, or scars and thyroid normal to palpation  RESP: lungs clear to auscultation - no rales, rhonchi or wheezes  CV: regular rate and rhythm, normal S1 S2, no S3 or S4, no murmur, click or rub, no peripheral edema and peripheral pulses strong  ABDOMEN: soft, nontender, no hepatosplenomegaly, no masses and bowel sounds normal  SKIN: Skin: vesicles and erythematous papules in a linear arrangements on abdomen- there is surrounding erythema and warmth- see photo.    Diagnostic Test " "Results:  Labs reviewed in Epic        Assessment & Plan     1. Cellulitis of abdominal wall  Treat with keflex and supportive cares.  Follow up in clinic if no improvement, worsening symptoms, or concerns.    - cephALEXin (KEFLEX) 500 MG capsule; Take 1 capsule (500 mg) by mouth 2 times daily for 10 days  Dispense: 20 capsule; Refill: 0     BMI:   Estimated body mass index is 50.74 kg/m  as calculated from the following:    Height as of this encounter: 1.799 m (5' 10.83\").    Weight as of this encounter: 164.2 kg (362 lb).   Weight management plan: Discussed healthy diet and exercise guidelines has lost 7 lb since visit two weeks ago.  encouraged in this regard.        Return in about 3 days (around 8/19/2019) for not improving or new concerns.    Courtney Orozco NP  [facility]    "

## 2019-08-16 ENCOUNTER — OFFICE VISIT (OUTPATIENT)
Dept: FAMILY MEDICINE | Facility: OTHER | Age: 39
End: 2019-08-16
Payer: COMMERCIAL

## 2019-08-16 VITALS
SYSTOLIC BLOOD PRESSURE: 118 MMHG | TEMPERATURE: 99.3 F | HEIGHT: 71 IN | WEIGHT: 315 LBS | DIASTOLIC BLOOD PRESSURE: 74 MMHG | BODY MASS INDEX: 44.1 KG/M2 | RESPIRATION RATE: 16 BRPM | HEART RATE: 72 BPM

## 2019-08-16 DIAGNOSIS — L03.311 CELLULITIS OF ABDOMINAL WALL: Primary | ICD-10-CM

## 2019-08-16 PROCEDURE — 99214 OFFICE O/P EST MOD 30 MIN: CPT | Performed by: NURSE PRACTITIONER

## 2019-08-16 RX ORDER — CEPHALEXIN 500 MG/1
500 CAPSULE ORAL 2 TIMES DAILY
Qty: 20 CAPSULE | Refills: 0 | Status: SHIPPED | OUTPATIENT
Start: 2019-08-16 | End: 2019-09-19

## 2019-08-16 ASSESSMENT — MIFFLIN-ST. JEOR: SCORE: 2586.4

## 2019-09-13 ENCOUNTER — HOSPITAL ENCOUNTER (EMERGENCY)
Facility: CLINIC | Age: 39
Discharge: HOME | End: 2019-09-13
Attending: PHYSICIAN ASSISTANT | Admitting: PHYSICIAN ASSISTANT
Payer: MEDICAID

## 2019-09-13 ENCOUNTER — TELEPHONE (OUTPATIENT)
Dept: FAMILY MEDICINE | Facility: OTHER | Age: 39
End: 2019-09-13

## 2019-09-13 VITALS
SYSTOLIC BLOOD PRESSURE: 139 MMHG | BODY MASS INDEX: 49.64 KG/M2 | TEMPERATURE: 99.1 F | HEART RATE: 99 BPM | RESPIRATION RATE: 20 BRPM | OXYGEN SATURATION: 99 % | DIASTOLIC BLOOD PRESSURE: 90 MMHG | WEIGHT: 315 LBS

## 2019-09-13 DIAGNOSIS — B34.9 VIRAL SYNDROME: ICD-10-CM

## 2019-09-13 DIAGNOSIS — R53.83 FATIGUE: ICD-10-CM

## 2019-09-13 DIAGNOSIS — R68.83 CHILLS: ICD-10-CM

## 2019-09-13 DIAGNOSIS — R52 BODY ACHES: ICD-10-CM

## 2019-09-13 LAB
ALBUMIN UR-MCNC: NEGATIVE MG/DL
ANION GAP SERPL CALCULATED.3IONS-SCNC: 7 MMOL/L (ref 3–14)
APPEARANCE UR: CLEAR
BASOPHILS # BLD AUTO: 0 10E9/L (ref 0–0.2)
BASOPHILS NFR BLD AUTO: 0 %
BILIRUB UR QL STRIP: NEGATIVE
BUN SERPL-MCNC: 17 MG/DL (ref 7–30)
CALCIUM SERPL-MCNC: 8.6 MG/DL (ref 8.5–10.1)
CHLORIDE SERPL-SCNC: 109 MMOL/L (ref 94–109)
CO2 SERPL-SCNC: 23 MMOL/L (ref 20–32)
COLOR UR AUTO: YELLOW
CREAT SERPL-MCNC: 0.66 MG/DL (ref 0.66–1.25)
CRP SERPL-MCNC: 54.8 MG/L (ref 0–8)
DIFFERENTIAL METHOD BLD: ABNORMAL
EOSINOPHIL # BLD AUTO: 0 10E9/L (ref 0–0.7)
EOSINOPHIL NFR BLD AUTO: 0 %
ERYTHROCYTE [DISTWIDTH] IN BLOOD BY AUTOMATED COUNT: 12.3 % (ref 10–15)
ERYTHROCYTE [SEDIMENTATION RATE] IN BLOOD BY WESTERGREN METHOD: 20 MM/H (ref 0–15)
GFR SERPL CREATININE-BSD FRML MDRD: >90 ML/MIN/{1.73_M2}
GLUCOSE SERPL-MCNC: 122 MG/DL (ref 70–99)
GLUCOSE UR STRIP-MCNC: NEGATIVE MG/DL
HCT VFR BLD AUTO: 41.4 % (ref 40–53)
HGB BLD-MCNC: 14.3 G/DL (ref 13.3–17.7)
HGB UR QL STRIP: NEGATIVE
KETONES UR STRIP-MCNC: 5 MG/DL
LEUKOCYTE ESTERASE UR QL STRIP: NEGATIVE
LYMPHOCYTES # BLD AUTO: 2.8 10E9/L (ref 0.8–5.3)
LYMPHOCYTES NFR BLD AUTO: 48 %
MCH RBC QN AUTO: 30.9 PG (ref 26.5–33)
MCHC RBC AUTO-ENTMCNC: 34.5 G/DL (ref 31.5–36.5)
MCV RBC AUTO: 89 FL (ref 78–100)
MONOCYTES # BLD AUTO: 0.4 10E9/L (ref 0–1.3)
MONOCYTES NFR BLD AUTO: 7 %
MUCOUS THREADS #/AREA URNS LPF: PRESENT /LPF
NEUTROPHILS # BLD AUTO: 2.3 10E9/L (ref 1.6–8.3)
NEUTROPHILS NFR BLD AUTO: 40 %
NEUTS BAND # BLD AUTO: 0.3 10E9/L (ref 0–0.6)
NEUTS BAND NFR BLD MANUAL: 5 %
NITRATE UR QL: NEGATIVE
PH UR STRIP: 7 PH (ref 5–7)
PLATELET # BLD AUTO: 137 10E9/L (ref 150–450)
PLATELET # BLD EST: ABNORMAL 10*3/UL
POTASSIUM SERPL-SCNC: 3.6 MMOL/L (ref 3.4–5.3)
RBC # BLD AUTO: 4.63 10E12/L (ref 4.4–5.9)
RBC #/AREA URNS AUTO: 2 /HPF (ref 0–2)
RBC MORPH BLD: NORMAL
SODIUM SERPL-SCNC: 139 MMOL/L (ref 133–144)
SOURCE: ABNORMAL
SP GR UR STRIP: 1.02 (ref 1–1.03)
SQUAMOUS #/AREA URNS AUTO: <1 /HPF (ref 0–1)
TSH SERPL DL<=0.005 MIU/L-ACNC: 0.93 MU/L (ref 0.4–4)
UROBILINOGEN UR STRIP-MCNC: 4 MG/DL (ref 0–2)
WBC # BLD AUTO: 5.8 10E9/L (ref 4–11)
WBC #/AREA URNS AUTO: 2 /HPF (ref 0–5)

## 2019-09-13 PROCEDURE — 36415 COLL VENOUS BLD VENIPUNCTURE: CPT | Performed by: PHYSICIAN ASSISTANT

## 2019-09-13 PROCEDURE — 86618 LYME DISEASE ANTIBODY: CPT | Performed by: PHYSICIAN ASSISTANT

## 2019-09-13 PROCEDURE — 85025 COMPLETE CBC W/AUTO DIFF WBC: CPT | Performed by: PHYSICIAN ASSISTANT

## 2019-09-13 PROCEDURE — 81001 URINALYSIS AUTO W/SCOPE: CPT | Performed by: PHYSICIAN ASSISTANT

## 2019-09-13 PROCEDURE — 96374 THER/PROPH/DIAG INJ IV PUSH: CPT | Performed by: PHYSICIAN ASSISTANT

## 2019-09-13 PROCEDURE — 99284 EMERGENCY DEPT VISIT MOD MDM: CPT | Mod: Z6 | Performed by: PHYSICIAN ASSISTANT

## 2019-09-13 PROCEDURE — 86140 C-REACTIVE PROTEIN: CPT | Performed by: PHYSICIAN ASSISTANT

## 2019-09-13 PROCEDURE — 85652 RBC SED RATE AUTOMATED: CPT | Performed by: PHYSICIAN ASSISTANT

## 2019-09-13 PROCEDURE — 96361 HYDRATE IV INFUSION ADD-ON: CPT | Performed by: PHYSICIAN ASSISTANT

## 2019-09-13 PROCEDURE — 25000128 H RX IP 250 OP 636: Performed by: PHYSICIAN ASSISTANT

## 2019-09-13 PROCEDURE — 80048 BASIC METABOLIC PNL TOTAL CA: CPT | Performed by: PHYSICIAN ASSISTANT

## 2019-09-13 PROCEDURE — 99284 EMERGENCY DEPT VISIT MOD MDM: CPT | Mod: 25 | Performed by: PHYSICIAN ASSISTANT

## 2019-09-13 PROCEDURE — 84443 ASSAY THYROID STIM HORMONE: CPT | Performed by: PHYSICIAN ASSISTANT

## 2019-09-13 RX ORDER — KETOROLAC TROMETHAMINE 30 MG/ML
30 INJECTION, SOLUTION INTRAMUSCULAR; INTRAVENOUS ONCE
Status: COMPLETED | OUTPATIENT
Start: 2019-09-13 | End: 2019-09-13

## 2019-09-13 RX ADMIN — SODIUM CHLORIDE 1000 ML: 9 INJECTION, SOLUTION INTRAVENOUS at 16:14

## 2019-09-13 RX ADMIN — KETOROLAC TROMETHAMINE 30 MG: 30 INJECTION, SOLUTION INTRAMUSCULAR at 15:09

## 2019-09-13 RX ADMIN — SODIUM CHLORIDE 1000 ML: 9 INJECTION, SOLUTION INTRAVENOUS at 15:03

## 2019-09-13 NOTE — TELEPHONE ENCOUNTER
"I spoke with patient.   For several days, he has thought his is dehydrated as his muscles will \"spasm and lock up\".   Yesterday, he started to feel clamming and he \"can't stop sweating\".   He will have intermittent nausea and mild headaches.   He is afebrile per the touch and doesn't have a thermometer.   He denies chest pain, pressure, or tightness.   He is not short of breath or dizzy.   He drank about six bottles of water yesterday, but none today as he doesn't feel thirsty.   He doesn't have a cough, but notes an increase in nasal congestion.   He also reports that he is \"super fatigued\".   Huddled with ES, who recommends evaluation in the ED.   Patient agrees and will go to [place].     Edie Nayak, RN, BSN    "

## 2019-09-13 NOTE — ED AVS SNAPSHOT
[facility] Emergency Department  [address] [place] [address]  Phone:  [phone]  Fax:  [phone]                                    Frankie Gomez   MRN: [id number]    Department:  [facility] Emergency Department   Date of Visit:  9/13/2019           After Visit Summary Signature Page    I have received my discharge instructions, and my questions have been answered. I have discussed any challenges I see with this plan with the nurse or doctor.    ..........................................................................................................................................  Patient/Patient Representative Signature      ..........................................................................................................................................  Patient Representative Print Name and Relationship to Patient    ..................................................               ................................................  Date                                   Time    ..........................................................................................................................................  Reviewed by Signature/Title    ...................................................              ..............................................  Date                                               Time          22EPIC Rev 08/18

## 2019-09-13 NOTE — ED TRIAGE NOTES
Presents with fatigue x 2-3 days with chills and diaphoresis. Reports intermittent headaches and nausea, denies a headache in triage.

## 2019-09-13 NOTE — ED PROVIDER NOTES
History     Chief Complaint   Patient presents with     Fatigue     The history is provided by the patient.     Frankie Gomez is a 38 year old male who presents to the ED complaining of fatigue, nausea and headache that started 3 days ago and his symptoms are not getting any better. Patient stated that he thought he was dehydrated and drank a bunch of water thinking that would help but he continues to feel sweaty then clammy, he is unsure if he had a fever as he doesn't have a thermometer at home. His headache and nausea are intermittent. Denies significant headache currently. He complains of feeling very achy all over. He reports that last night he was twitching all over his body while he was trying to sleep, so much it woke up his girlfriend. Patient has been using Tylenol and Ibuprofen without relief of his symptoms. He recently was treated for poison ivy on his abd, he was given an antibiotic but that cleared up. Patient denies chest pain, SOB, abdominal pain, vomiting,or diarrhea.  Denies any recent tick exposure, no new rashes.      Allergies:  Allergies   Allergen Reactions     No Known Allergies        Problem List:    Patient Active Problem List    Diagnosis Date Noted     Seasonal allergic rhinitis due to other allergic trigger 07/30/2019     Priority: Medium     Post-nasal drip 07/30/2019     Priority: Medium     Increased frequency of urination 07/30/2019     Priority: Medium     S/P laparoscopic cholecystectomy 02/13/2017     Priority: Medium     Diverticulosis of large intestine 01/01/2017     Priority: Medium     External hemorrhoids 01/01/2017     Priority: Medium     SVT (supraventricular tachycardia) (H) 05/27/2015     Priority: Medium     NASIR (obstructive sleep apnea) 12/15/2014     Priority: Medium     [place] 11/20/2014 AHI 5.2 RDI 11.8        Heartburn      Priority: Medium     Morbid obesity (H)      Priority: Medium     Hypertension      Priority: Medium     Hyperlipidemia       Priority: Medium     Diagnosis updated by automated process. Provider to review and confirm.       Asthma      Priority: Medium     [employer] Update utility       Fatty liver disease, nonalcoholic      Priority: Medium     Generalized anxiety disorder      Priority: Medium     Diagnosis updated by automated process. Provider to review and confirm.       Panic      Priority: Medium     Chronic low back pain      Priority: Medium     fall injury, fell through stairs-hit concrete       Lumbosacral neuritis 01/28/2013     Priority: Medium     CARDIOVASCULAR SCREENING; LDL GOAL LESS THAN 130 02/02/2011     Priority: Medium     Major depressive disorder, single episode, moderate (H) 02/02/2011     Priority: Medium     Displacement of intervertebral disc, site unspecified, without myelopathy 02/12/2007     Priority: Medium     Tobacco use disorder 11/21/2006     Priority: Medium     Pain in joint, forearm 06/25/2006     Priority: Medium     Degeneration of lumbar or lumbosacral intervertebral disc 08/10/2005     Priority: Medium        Past Medical History:    Past Medical History:   Diagnosis Date     Chronic low back pain      Depression      Diverticulitis      Fatty liver disease, nonalcoholic      Former smoker      MARISOL (generalised anxiety disorder)      Hyperlipaemia      Hypertension      Morbid obesity (H)      OA (osteoarthritis)      NASIR (obstructive sleep apnea) 11/20/2014     Panic      SVT (supraventricular tachycardia) (H)      Unspecified sinusitis (chronic)        Past Surgical History:    Past Surgical History:   Procedure Laterality Date     APPENDECTOMY  07/25/09     COLONOSCOPY N/A 12/30/2016    Procedure: COMBINED COLONOSCOPY, SINGLE OR MULTIPLE BIOPSY/POLYPECTOMY BY BIOPSY;  Surgeon: Rikki Jenkins MD;  Location:  GI     ESOPHAGOSCOPY, GASTROSCOPY, DUODENOSCOPY (EGD), COMBINED N/A 12/30/2016    Procedure: COMBINED ESOPHAGOSCOPY, GASTROSCOPY, DUODENOSCOPY (EGD), BIOPSY SINGLE OR MULTIPLE;  Surgeon:  Rikki Jenkins MD;  Location: [facility]     EXCISE MASS FOOT Right 2015    Procedure: EXCISE MASS FOOT;  Surgeon: Ramo Decker DPM;  Location: [facility] OR     HC COLONOSCOPY W SNARE REMOVAL TUMOR/POLYP/LESION       LAPAROSCOPIC CHOLECYSTECTOMY N/A 2017    Procedure: LAPAROSCOPIC CHOLECYSTECTOMY;  Surgeon: Toby Bishop MD;  Location: [facility] OR       Family History:    Family History   Problem Relation Age of Onset     Connective Tissue Disorder Mother         sjogrens     Depression Mother         on meds     Neurologic Disorder Mother         restless leg, migraines     Thyroid Disease Mother      C.A.D. Mother      Obesity Mother         Az-en-Y, snores     Obesity Father         snores     Cancer Paternal Grandmother      Obesity Paternal Grandfather        Social History:  Marital Status:  Single [1]  Social History     Tobacco Use     Smoking status: Former Smoker     Packs/day: 0.75     Years: 13.00     Pack years: 9.75     Types: Cigarettes     Last attempt to quit: 4/15/2013     Years since quittin.4     Smokeless tobacco: Never Used     Tobacco comment: using electric cigarette   Substance Use Topics     Alcohol use: No     Drug use: Yes     Types: Marijuana     Comment: marijuana occasional use        Medications:      Acetaminophen (TYLENOL PO)   aspirin 81 MG EC tablet   atorvastatin (LIPITOR) 10 MG tablet   IBUPROFEN PO   lisinopril-hydrochlorothiazide (PRINZIDE/ZESTORETIC) 10-12.5 MG tablet   omeprazole (PRILOSEC) 40 MG capsule   order for DME   order for DME   PROAIR  (90 BASE) MCG/ACT inhaler   tamsulosin (FLOMAX) 0.4 MG capsule         Review of Systems   All other systems reviewed and are negative.      Physical Exam   BP: (!) 130/93  Pulse: 99  Temp: 99.1  F (37.3  C)  Resp: 18  Weight: (!) 160.7 kg (354 lb 3.2 oz)  SpO2: 99 %      Physical Exam   Constitutional: He is oriented to person, place, and time. He appears well-developed and well-nourished. No distress.    Obese male, appears fatigued but not ill, not toxic   HENT:   Head: Normocephalic and atraumatic.   Mouth/Throat: Oropharynx is clear and moist. No oropharyngeal exudate.   Eyes: Pupils are equal, round, and reactive to light. Conjunctivae and EOM are normal. No scleral icterus.   Neck: Normal range of motion. Neck supple.   Cardiovascular: Normal rate, regular rhythm, normal heart sounds and intact distal pulses.   Pulmonary/Chest: Effort normal and breath sounds normal. No respiratory distress.   Abdominal: Soft. Bowel sounds are normal. There is no tenderness.   Musculoskeletal: Normal range of motion. He exhibits no edema or tenderness.   Neurological: He is alert and oriented to person, place, and time. No cranial nerve deficit. He exhibits normal muscle tone. Coordination normal.   Skin: Skin is warm and dry. Capillary refill takes less than 2 seconds. No rash noted. He is not diaphoretic.   Psychiatric: He has a normal mood and affect. His behavior is normal.   Nursing note and vitals reviewed.      ED Course        Procedures      Results for orders placed or performed during the hospital encounter of 09/13/19 (from the past 24 hour(s))   CBC with platelets differential   Result Value Ref Range    WBC 5.8 4.0 - 11.0 10e9/L    RBC Count 4.63 4.4 - 5.9 10e12/L    Hemoglobin 14.3 13.3 - 17.7 g/dL    Hematocrit 41.4 40.0 - 53.0 %    MCV 89 78 - 100 fl    MCH 30.9 26.5 - 33.0 pg    MCHC 34.5 31.5 - 36.5 g/dL    RDW 12.3 10.0 - 15.0 %    Platelet Count 137 (L) 150 - 450 10e9/L    Diff Method Manual Differential     % Neutrophils 40.0 %    % Lymphocytes 48.0 %    % Monocytes 7.0 %    % Eosinophils 0.0 %    % Basophils 0.0 %    % Band 5.0 %    Absolute Neutrophil 2.3 1.6 - 8.3 10e9/L    Absolute Lymphocytes 2.8 0.8 - 5.3 10e9/L    Absolute Monocytes 0.4 0.0 - 1.3 10e9/L    Absolute Eosinophils 0.0 0.0 - 0.7 10e9/L    Absolute Basophils 0.0 0.0 - 0.2 10e9/L    Absolute Bands 0.3 0.0 - 0.6 10e9/L    RBC Morphology  Normal     Platelet Estimate Confirming automated cell count    Basic metabolic panel   Result Value Ref Range    Sodium 139 133 - 144 mmol/L    Potassium 3.6 3.4 - 5.3 mmol/L    Chloride 109 94 - 109 mmol/L    Carbon Dioxide 23 20 - 32 mmol/L    Anion Gap 7 3 - 14 mmol/L    Glucose 122 (H) 70 - 99 mg/dL    Urea Nitrogen 17 7 - 30 mg/dL    Creatinine 0.66 0.66 - 1.25 mg/dL    GFR Estimate >90 >60 mL/min/[1.73_m2]    GFR Estimate If Black >90 >60 mL/min/[1.73_m2]    Calcium 8.6 8.5 - 10.1 mg/dL   TSH with free T4 reflex   Result Value Ref Range    TSH 0.93 0.40 - 4.00 mU/L   CRP inflammation   Result Value Ref Range    CRP Inflammation 54.8 (H) 0.0 - 8.0 mg/L   Erythrocyte sedimentation rate auto   Result Value Ref Range    Sed Rate 20 (H) 0 - 15 mm/h   UA with Microscopic   Result Value Ref Range    Color Urine Yellow     Appearance Urine Clear     Glucose Urine Negative NEG^Negative mg/dL    Bilirubin Urine Negative NEG^Negative    Ketones Urine 5 (A) NEG^Negative mg/dL    Specific Gravity Urine 1.024 1.003 - 1.035    Blood Urine Negative NEG^Negative    pH Urine 7.0 5.0 - 7.0 pH    Protein Albumin Urine Negative NEG^Negative mg/dL    Urobilinogen mg/dL 4.0 (H) 0.0 - 2.0 mg/dL    Nitrite Urine Negative NEG^Negative    Leukocyte Esterase Urine Negative NEG^Negative    Source Midstream Urine     WBC Urine 2 0 - 5 /HPF    RBC Urine 2 0 - 2 /HPF    Squamous Epithelial /HPF Urine <1 0 - 1 /HPF    Mucous Urine Present (A) NEG^Negative /LPF       Medications   0.9% sodium chloride BOLUS (0 mLs Intravenous Stopped 9/13/19 1600)   ketorolac (TORADOL) injection 30 mg (30 mg Intravenous Given 9/13/19 1509)   0.9% sodium chloride BOLUS (0 mLs Intravenous Stopped 9/13/19 1711)       Assessments & Plan (with Medical Decision Making)  Frankie Gomez is a 38 year old male who presented to the ED with a 3-day history of body aches, chills, sweats, intermittent headaches and nausea.  On arrival to the ED he was afebrile,  unremarkable vital signs.  Exam today revealed no acute abnormalities-normal lung sounds, normal abdominal exam, no meningeal signs.  He has rather vague symptoms suggestive of some sort of infectious syndrome.  He was agreeable to further evaluation with labs.  Given IV fluids and Toradol for his symptoms while here.  Labs reviewed, his white count was normal at 5.8, hemoglobin 14.3.  He had slightly low platelets at 137.  CRP elevated at 54.8, and ESR 20.  BMP unremarkable.  Urinalysis negative for signs of infection.  I did add a Lyme titer and this is currently pending.  I discussed these results with the patient.  I am not finding a clear cause for his symptoms but his lab work-up does not reveal anything significantly abnormal to suggest need for further evaluation with imaging or hospitalization.  He is vitally stable and I think his symptoms can be managed in the outpatient setting.  He likely has an underlying viral syndrome contributing to symptoms.  He was agreeable to plan of resting, lots of fluids, ibuprofen or Tylenol for body aches/headache.  He will receive a call if Lyme titer is positive.  If symptoms are improved in the next 2 days he should follow-up in the clinic for recheck.  I did go over warning signs and symptoms of when to return to the ED.  All questions were answered and patient was comfortable with this plan and with discharge at this time.     I have reviewed the nursing notes.    I have reviewed the findings, diagnosis, plan and need for follow up with the patient.    Discharge Medication List as of 9/13/2019  6:27 PM          Final diagnoses:   Fatigue   Chills   Viral syndrome   Body aches     This document serves as a record of services personally performed by Diana Hutchison PA.  It was created on their behalf by Brittany Larson, a trained medical scribe. The creation of this record is based on the provider's personal observations and the statements of the patient. This document  has been checked and approved by the attending provider.    Disclaimer : This note consists of symbols derived from keyboarding, dictation and/or voice recognition software. As a result, there may be errors in the script that have gone undetected. Please consider this when interpreting information found in this chart.      9/13/2019   [facility] EMERGENCY DEPARTMENT     Diana Hutchison PA-C  09/13/19 1880

## 2019-09-13 NOTE — DISCHARGE INSTRUCTIONS
I think it is possible you have some sort of virus causing you do not feel well.  We did test you for Lyme disease and if this comes back positive you will receive a call and will be sent antibiotics for treatment. Please be sure you are drinking lots of fluids.  Use Tylenol or ibuprofen as needed for body aches.  Get plenty of rest.  If no improvement in a few days, follow-up in the clinic.  If you develop any worsening symptoms please return to the emergency department.    Thank you for choosing [facility]'s Emergency Department. It was a pleasure taking care of you today. If you have any questions, please call [phone].    Diana Hutchison PA-C

## 2019-09-16 ENCOUNTER — APPOINTMENT (OUTPATIENT)
Dept: GENERAL RADIOLOGY | Facility: CLINIC | Age: 39
End: 2019-09-16
Attending: PHYSICIAN ASSISTANT
Payer: MEDICAID

## 2019-09-16 ENCOUNTER — HOSPITAL ENCOUNTER (EMERGENCY)
Facility: CLINIC | Age: 39
Discharge: HOME | End: 2019-09-16
Attending: PHYSICIAN ASSISTANT | Admitting: PHYSICIAN ASSISTANT
Payer: MEDICAID

## 2019-09-16 VITALS
RESPIRATION RATE: 18 BRPM | OXYGEN SATURATION: 96 % | TEMPERATURE: 96.6 F | HEART RATE: 87 BPM | SYSTOLIC BLOOD PRESSURE: 111 MMHG | DIASTOLIC BLOOD PRESSURE: 78 MMHG

## 2019-09-16 DIAGNOSIS — M79.10 MUSCLE PAIN: ICD-10-CM

## 2019-09-16 DIAGNOSIS — B27.90 INFECTIOUS MONONUCLEOSIS: ICD-10-CM

## 2019-09-16 LAB
ALBUMIN SERPL-MCNC: 3.1 G/DL (ref 3.4–5)
ALBUMIN UR-MCNC: NEGATIVE MG/DL
ALP SERPL-CCNC: 412 U/L (ref 40–150)
ALT SERPL W P-5'-P-CCNC: 353 U/L (ref 0–70)
ANION GAP SERPL CALCULATED.3IONS-SCNC: 11 MMOL/L (ref 3–14)
APPEARANCE UR: CLEAR
AST SERPL W P-5'-P-CCNC: 203 U/L (ref 0–45)
B BURGDOR IGG+IGM SER QL: 0.2 (ref 0–0.89)
BASOPHILS # BLD AUTO: 0 10E9/L (ref 0–0.2)
BASOPHILS NFR BLD AUTO: 0 %
BILIRUB SERPL-MCNC: 4 MG/DL (ref 0.2–1.3)
BILIRUB UR QL STRIP: NEGATIVE
BUN SERPL-MCNC: 7 MG/DL (ref 7–30)
CALCIUM SERPL-MCNC: 8.2 MG/DL (ref 8.5–10.1)
CHLORIDE SERPL-SCNC: 104 MMOL/L (ref 94–109)
CO2 SERPL-SCNC: 21 MMOL/L (ref 20–32)
COLOR UR AUTO: ABNORMAL
CREAT SERPL-MCNC: 0.63 MG/DL (ref 0.66–1.25)
CRP SERPL-MCNC: 43 MG/L (ref 0–8)
DEPRECATED S PYO AG THROAT QL EIA: NORMAL
DIFFERENTIAL METHOD BLD: ABNORMAL
EOSINOPHIL # BLD AUTO: 0 10E9/L (ref 0–0.7)
EOSINOPHIL NFR BLD AUTO: 0 %
ERYTHROCYTE [DISTWIDTH] IN BLOOD BY AUTOMATED COUNT: 12.6 % (ref 10–15)
ERYTHROCYTE [SEDIMENTATION RATE] IN BLOOD BY WESTERGREN METHOD: 20 MM/H (ref 0–15)
GFR SERPL CREATININE-BSD FRML MDRD: >90 ML/MIN/{1.73_M2}
GLUCOSE SERPL-MCNC: 104 MG/DL (ref 70–99)
GLUCOSE UR STRIP-MCNC: NEGATIVE MG/DL
HCT VFR BLD AUTO: 41 % (ref 40–53)
HETEROPH AB SER QL: POSITIVE
HGB BLD-MCNC: 14.5 G/DL (ref 13.3–17.7)
HGB UR QL STRIP: NEGATIVE
KETONES UR STRIP-MCNC: 20 MG/DL
LACTATE BLD-SCNC: 1.6 MMOL/L (ref 0.7–2)
LEUKOCYTE ESTERASE UR QL STRIP: NEGATIVE
LYMPHOCYTES # BLD AUTO: 7.6 10E9/L (ref 0.8–5.3)
LYMPHOCYTES NFR BLD AUTO: 71 %
MCH RBC QN AUTO: 31 PG (ref 26.5–33)
MCHC RBC AUTO-ENTMCNC: 35.4 G/DL (ref 31.5–36.5)
MCV RBC AUTO: 88 FL (ref 78–100)
MONOCYTES # BLD AUTO: 0.6 10E9/L (ref 0–1.3)
MONOCYTES NFR BLD AUTO: 6 %
NEUTROPHILS # BLD AUTO: 2.1 10E9/L (ref 1.6–8.3)
NEUTROPHILS NFR BLD AUTO: 20 %
NEUTS BAND # BLD AUTO: 0.1 10E9/L (ref 0–0.6)
NEUTS BAND NFR BLD MANUAL: 1 %
NITRATE UR QL: NEGATIVE
OTHER CELLS # BLD MANUAL: 0.2 10E9/L
OTHER CELLS NFR BLD MANUAL: 2 %
PH UR STRIP: 6 PH (ref 5–7)
PLATELET # BLD AUTO: 115 10E9/L (ref 150–450)
PLATELET # BLD EST: ABNORMAL 10*3/UL
POTASSIUM SERPL-SCNC: 3.1 MMOL/L (ref 3.4–5.3)
PROT SERPL-MCNC: 7.2 G/DL (ref 6.8–8.8)
RBC # BLD AUTO: 4.67 10E12/L (ref 4.4–5.9)
RBC MORPH BLD: NORMAL
SODIUM SERPL-SCNC: 136 MMOL/L (ref 133–144)
SOURCE: ABNORMAL
SP GR UR STRIP: 1.01 (ref 1–1.03)
SPECIMEN SOURCE: NORMAL
TSH SERPL DL<=0.005 MIU/L-ACNC: 2.17 MU/L (ref 0.4–4)
UROBILINOGEN UR STRIP-MCNC: 4 MG/DL (ref 0–2)
VARIANT LYMPHS BLD QL SMEAR: PRESENT
WBC # BLD AUTO: 10.7 10E9/L (ref 4–11)

## 2019-09-16 PROCEDURE — 87880 STREP A ASSAY W/OPTIC: CPT | Performed by: PHYSICIAN ASSISTANT

## 2019-09-16 PROCEDURE — 83605 ASSAY OF LACTIC ACID: CPT | Performed by: PHYSICIAN ASSISTANT

## 2019-09-16 PROCEDURE — 71046 X-RAY EXAM CHEST 2 VIEWS: CPT | Mod: TC

## 2019-09-16 PROCEDURE — 86308 HETEROPHILE ANTIBODY SCREEN: CPT | Performed by: PHYSICIAN ASSISTANT

## 2019-09-16 PROCEDURE — 84443 ASSAY THYROID STIM HORMONE: CPT | Performed by: PHYSICIAN ASSISTANT

## 2019-09-16 PROCEDURE — 87040 BLOOD CULTURE FOR BACTERIA: CPT | Mod: XS | Performed by: PHYSICIAN ASSISTANT

## 2019-09-16 PROCEDURE — 99284 EMERGENCY DEPT VISIT MOD MDM: CPT | Mod: Z6 | Performed by: PHYSICIAN ASSISTANT

## 2019-09-16 PROCEDURE — 86618 LYME DISEASE ANTIBODY: CPT | Performed by: PHYSICIAN ASSISTANT

## 2019-09-16 PROCEDURE — 25000128 H RX IP 250 OP 636: Performed by: PHYSICIAN ASSISTANT

## 2019-09-16 PROCEDURE — 25800030 ZZH RX IP 258 OP 636: Performed by: PHYSICIAN ASSISTANT

## 2019-09-16 PROCEDURE — 96361 HYDRATE IV INFUSION ADD-ON: CPT | Performed by: PHYSICIAN ASSISTANT

## 2019-09-16 PROCEDURE — 96375 TX/PRO/DX INJ NEW DRUG ADDON: CPT | Performed by: PHYSICIAN ASSISTANT

## 2019-09-16 PROCEDURE — 99284 EMERGENCY DEPT VISIT MOD MDM: CPT | Mod: 25 | Performed by: PHYSICIAN ASSISTANT

## 2019-09-16 PROCEDURE — 85025 COMPLETE CBC W/AUTO DIFF WBC: CPT | Performed by: PHYSICIAN ASSISTANT

## 2019-09-16 PROCEDURE — 80053 COMPREHEN METABOLIC PANEL: CPT | Performed by: PHYSICIAN ASSISTANT

## 2019-09-16 PROCEDURE — 87081 CULTURE SCREEN ONLY: CPT | Performed by: PHYSICIAN ASSISTANT

## 2019-09-16 PROCEDURE — 25000132 ZZH RX MED GY IP 250 OP 250 PS 637: Performed by: PHYSICIAN ASSISTANT

## 2019-09-16 PROCEDURE — 86140 C-REACTIVE PROTEIN: CPT | Performed by: PHYSICIAN ASSISTANT

## 2019-09-16 PROCEDURE — 96374 THER/PROPH/DIAG INJ IV PUSH: CPT | Performed by: PHYSICIAN ASSISTANT

## 2019-09-16 PROCEDURE — 81003 URINALYSIS AUTO W/O SCOPE: CPT | Performed by: PHYSICIAN ASSISTANT

## 2019-09-16 PROCEDURE — 36415 COLL VENOUS BLD VENIPUNCTURE: CPT | Performed by: PHYSICIAN ASSISTANT

## 2019-09-16 PROCEDURE — 85652 RBC SED RATE AUTOMATED: CPT | Performed by: PHYSICIAN ASSISTANT

## 2019-09-16 RX ORDER — OXYCODONE HYDROCHLORIDE 5 MG/1
2.5-5 TABLET ORAL EVERY 6 HOURS PRN
Qty: 12 TABLET | Refills: 0 | Status: SHIPPED | OUTPATIENT
Start: 2019-09-16 | End: 2019-09-19

## 2019-09-16 RX ORDER — ONDANSETRON 2 MG/ML
4 INJECTION INTRAMUSCULAR; INTRAVENOUS EVERY 30 MIN PRN
Status: DISCONTINUED | OUTPATIENT
Start: 2019-09-16 | End: 2019-09-16 | Stop reason: HOSPADM

## 2019-09-16 RX ORDER — POTASSIUM CHLORIDE 1500 MG/1
20 TABLET, EXTENDED RELEASE ORAL ONCE
Status: COMPLETED | OUTPATIENT
Start: 2019-09-16 | End: 2019-09-16

## 2019-09-16 RX ORDER — ONDANSETRON 4 MG/1
4 TABLET, ORALLY DISINTEGRATING ORAL EVERY 8 HOURS PRN
Qty: 10 TABLET | Refills: 0 | Status: SHIPPED | OUTPATIENT
Start: 2019-09-16 | End: 2019-09-19

## 2019-09-16 RX ORDER — KETOROLAC TROMETHAMINE 30 MG/ML
30 INJECTION, SOLUTION INTRAMUSCULAR; INTRAVENOUS ONCE
Status: COMPLETED | OUTPATIENT
Start: 2019-09-16 | End: 2019-09-16

## 2019-09-16 RX ORDER — SODIUM CHLORIDE 9 MG/ML
INJECTION, SOLUTION INTRAVENOUS CONTINUOUS
Status: DISCONTINUED | OUTPATIENT
Start: 2019-09-16 | End: 2019-09-16 | Stop reason: HOSPADM

## 2019-09-16 RX ADMIN — POTASSIUM CHLORIDE 20 MEQ: 1500 TABLET, EXTENDED RELEASE ORAL at 18:05

## 2019-09-16 RX ADMIN — ONDANSETRON 4 MG: 2 INJECTION INTRAMUSCULAR; INTRAVENOUS at 16:08

## 2019-09-16 RX ADMIN — KETOROLAC TROMETHAMINE 30 MG: 30 INJECTION, SOLUTION INTRAMUSCULAR at 15:54

## 2019-09-16 RX ADMIN — SODIUM CHLORIDE 1000 ML: 9 INJECTION, SOLUTION INTRAVENOUS at 15:09

## 2019-09-16 RX ADMIN — SODIUM CHLORIDE 1000 ML: 9 INJECTION, SOLUTION INTRAVENOUS at 17:38

## 2019-09-16 RX ADMIN — SODIUM CHLORIDE 1000 ML: 9 INJECTION, SOLUTION INTRAVENOUS at 15:53

## 2019-09-16 NOTE — RESULT ENCOUNTER NOTE
Final result for Lyme Disease Bhakti with reflex to WB Serum is NEGATIVE.    No change in treatment per [facility] ED Lab Result protocol.

## 2019-09-16 NOTE — ED TRIAGE NOTES
Here with shortness of, fever, low energy, and diaphoresis. Seen in the ER last Friday and diagnosed with a Virus.

## 2019-09-16 NOTE — PROGRESS NOTES
Subjective     Frankie Gomez is a 38 year old male who presents to clinic today for the following health issues:    HPI   ED/UC Followup:    Facility:  [facility]  Date of visit: 09/16/19  Reason for visit: Shortness of breath  Current Status: Patient continues to have body aches in major joints, chills with sweats, difficulty with focus, nausea that is better with zofran, headaches,      Patient Active Problem List   Diagnosis     Degeneration of lumbar or lumbosacral intervertebral disc     Pain in joint, forearm     Tobacco use disorder     Displacement of intervertebral disc, site unspecified, without myelopathy     CARDIOVASCULAR SCREENING; LDL GOAL LESS THAN 130     Major depressive disorder, single episode, moderate (H)     Lumbosacral neuritis     Heartburn     Morbid obesity (H)     Hypertension     Hyperlipidemia     Asthma     Fatty liver disease, nonalcoholic     Generalized anxiety disorder     Panic     Chronic low back pain     NASIR (obstructive sleep apnea)     SVT (supraventricular tachycardia) (H)     Diverticulosis of large intestine     External hemorrhoids     S/P laparoscopic cholecystectomy     Seasonal allergic rhinitis due to other allergic trigger     Post-nasal drip     Increased frequency of urination     Tension headache     Other infectious mononucleosis with other complication     Past Surgical History:   Procedure Laterality Date     APPENDECTOMY  07/25/09     COLONOSCOPY N/A 12/30/2016    Procedure: COMBINED COLONOSCOPY, SINGLE OR MULTIPLE BIOPSY/POLYPECTOMY BY BIOPSY;  Surgeon: Rikki Jenkins MD;  Location:  GI     ESOPHAGOSCOPY, GASTROSCOPY, DUODENOSCOPY (EGD), COMBINED N/A 12/30/2016    Procedure: COMBINED ESOPHAGOSCOPY, GASTROSCOPY, DUODENOSCOPY (EGD), BIOPSY SINGLE OR MULTIPLE;  Surgeon: Rikki Jenkins MD;  Location:  GI     EXCISE MASS FOOT Right 2/13/2015    Procedure: EXCISE MASS FOOT;  Surgeon: Ramo Decker DPM;  Location:  OR      COLONOSCOPY W SNARE  REMOVAL TUMOR/POLYP/LESION       LAPAROSCOPIC CHOLECYSTECTOMY N/A 2017    Procedure: LAPAROSCOPIC CHOLECYSTECTOMY;  Surgeon: Toby Bishop MD;  Location: [facility] OR       Social History     Tobacco Use     Smoking status: Former Smoker     Packs/day: 0.75     Years: 13.00     Pack years: 9.75     Types: Cigarettes     Last attempt to quit: 4/15/2013     Years since quittin.4     Smokeless tobacco: Never Used     Tobacco comment: using electric cigarette   Substance Use Topics     Alcohol use: No     Family History   Problem Relation Age of Onset     Connective Tissue Disorder Mother         sjogrens     Depression Mother         on meds     Neurologic Disorder Mother         restless leg, migraines     Thyroid Disease Mother      C.A.D. Mother      Obesity Mother         Az-en-Y, snores     Obesity Father         snores     Cancer Paternal Grandmother      Obesity Paternal Grandfather          Current Outpatient Medications   Medication Sig Dispense Refill     aspirin 81 MG EC tablet Take 1 tablet (81 mg) by mouth daily 90 tablet 3     atorvastatin (LIPITOR) 10 MG tablet Take 1 tablet (10 mg) by mouth daily 90 tablet 1     IBUPROFEN PO Take 600 mg by mouth daily Reported on 2017       lisinopril-hydrochlorothiazide (PRINZIDE/ZESTORETIC) 10-12.5 MG tablet Take 1 tablet by mouth daily 90 tablet 1     omeprazole (PRILOSEC) 40 MG capsule Take 40 mg by mouth as needed Reported on 2017       ondansetron (ZOFRAN ODT) 4 MG ODT tab Take 1 tablet (4 mg) by mouth every 8 hours as needed for nausea or vomiting 10 tablet 0     oxyCODONE (ROXICODONE) 5 MG tablet Take 0.5-1 tablets (2.5-5 mg) by mouth every 6 hours as needed for pain 12 tablet 0     PROAIR  (90 BASE) MCG/ACT inhaler        tamsulosin (FLOMAX) 0.4 MG capsule Take 1 capsule (0.4 mg) by mouth daily 90 capsule 1     Acetaminophen (TYLENOL PO) Take 1,000 mg by mouth daily Reported on 2017       order for DME Equipment being ordered:  cpap supplies -- tubing with humidity and nasal mask (Patient not taking: Reported on 8/16/2019) 1 Device 0     order for DME Auto CPAP @@ 5-15 cm with heated humidifier via nasal mask. (Patient not taking: Reported on 8/16/2019) 1 Device 0     Allergies   Allergen Reactions     No Known Allergies      BP Readings from Last 3 Encounters:   09/19/19 126/78   09/16/19 111/78   09/13/19 (!) 139/90    Wt Readings from Last 3 Encounters:   09/19/19 (!) 158 kg (348 lb 4.8 oz)   09/13/19 (!) 160.7 kg (354 lb 3.2 oz)   08/16/19 (!) 164.2 kg (362 lb)                    Reviewed and updated as needed this visit by Provider         Review of Systems   ROS COMP: Constitutional, HEENT, cardiovascular, pulmonary, GI, , musculoskeletal, neuro, skin, endocrine and psych systems are negative, except as otherwise noted.      Objective    /78   Pulse 96   Temp 97.3  F (36.3  C) (Temporal)   Resp 28   Wt (!) 158 kg (348 lb 4.8 oz)   SpO2 98%   BMI 48.82 kg/m    Body mass index is 48.82 kg/m .  Physical Exam   GENERAL: healthy, alert and no distress  NECK: no adenopathy, no asymmetry, masses, or scars and trachea midline and normal to palpation  RESP: lungs clear to auscultation - no rales, rhonchi or wheezes  CV: regular rate and rhythm, normal S1 S2, no S3 or S4, no murmur, click or rub, no peripheral edema and peripheral pulses strong  ABDOMEN: soft, nontender, no hepatosplenomegaly, no masses and bowel sounds normal  MS: no gross musculoskeletal defects noted, no edema  SKIN: no suspicious lesions or rashes to exposed skin.  NEURO: Normal strength and tone, mentation intact and speech normal  PSYCH: He is very fatigued today minimal anxiety is reported.    Diagnostic Test Results:  Labs reviewed in Epic  Results for orders placed or performed in visit on 09/19/19 (from the past 24 hour(s))   CBC with platelets   Result Value Ref Range    WBC 7.6 4.0 - 11.0 10e9/L    RBC Count 4.47 4.4 - 5.9 10e12/L    Hemoglobin 13.7  "13.3 - 17.7 g/dL    Hematocrit 40.6 40.0 - 53.0 %    MCV 91 78 - 100 fl    MCH 30.6 26.5 - 33.0 pg    MCHC 33.7 31.5 - 36.5 g/dL    RDW 14.0 10.0 - 15.0 %    Platelet Count 141 (L) 150 - 450 10e9/L           Assessment & Plan     1. NASIR (obstructive sleep apnea)  - *UA reflex to Microscopic and Culture ([facility] (except [facility]); Future  - Comprehensive metabolic panel  - CBC with platelets  - ondansetron (ZOFRAN ODT) 4 MG ODT tab; Take 1 tablet (4 mg) by mouth every 8 hours as needed for nausea or vomiting  Dispense: 10 tablet; Refill: 0  - oxyCODONE (ROXICODONE) 5 MG tablet; Take 0.5-1 tablets (2.5-5 mg) by mouth every 6 hours as needed for pain  Dispense: 12 tablet; Refill: 0    2. SVT (supraventricular tachycardia) (H)  - *UA reflex to Microscopic and Culture ([facility] (except [place] and [place]); Future  - Comprehensive metabolic panel  - CBC with platelets  - ondansetron (ZOFRAN ODT) 4 MG ODT tab; Take 1 tablet (4 mg) by mouth every 8 hours as needed for nausea or vomiting  Dispense: 10 tablet; Refill: 0  - oxyCODONE (ROXICODONE) 5 MG tablet; Take 0.5-1 tablets (2.5-5 mg) by mouth every 6 hours as needed for pain  Dispense: 12 tablet; Refill: 0    3. Other infectious mononucleosis with other complication  4. Tension headache  - EBV Capsid Antibody IgG  - EBV Capsid Antibody IgM      BMI:   Estimated body mass index is 48.82 kg/m  as calculated from the following:    Height as of 8/16/19: 1.799 m (5' 10.83\").    Weight as of this encounter: 158 kg (348 lb 4.8 oz).   Weight management plan: Discussed healthy diet and exercise guidelines    Return in about 2 weeks (around 10/3/2019) for Medication Recheck, recheck of current condition.    Alvin Garcia PA-C  [facility]"

## 2019-09-16 NOTE — ED PROVIDER NOTES
"  History     Chief Complaint   Patient presents with     Shortness of Breath     The history is provided by the patient and the spouse.     Frankie Gomez is a 38 year old male who presents to the emergency department for shortness of breath. Patient reports having shortness of breath, fever, fatigue and diaphoresis for 5-6 days. He was seen in the ED on 9/13/19, diagnosed with a virus and now is back with worsening symptoms. Patient reports having heat intolerance, body aches, no appetite, nausea, vomiting, headache, weakness, diaphoresis, neck pain, rash on abdomen that itches, dizziness when he sits up and is unable to sleep. He has not been able to eat anything stating everything makes him nauseous. He states over the weekend he had a hard time getting up to get water. He reports urinating \"mud\" the other day. He denies any cough or chest pain. He is having normal bowel movements. His wife reports on 9/12/19 he was \"flopping like a fish while sleeping\". He denies any recent surgeries.      Excerpt from his ED note on 9/13/19:    Assessments & Plan (with Medical Decision Making)  Frankie Gomez is a 38 year old male who presented to the ED with a 3-day history of body aches, chills, sweats, intermittent headaches and nausea.  On arrival to the ED he was afebrile, unremarkable vital signs.  Exam today revealed no acute abnormalities-normal lung sounds, normal abdominal exam, no meningeal signs.  He has rather vague symptoms suggestive of some sort of infectious syndrome.  He was agreeable to further evaluation with labs.  Given IV fluids and Toradol for his symptoms while here.  Labs reviewed, his white count was normal at 5.8, hemoglobin 14.3.  He had slightly low platelets at 137.  CRP elevated at 54.8, and ESR 20.  BMP unremarkable.  Urinalysis negative for signs of infection.  I did add a Lyme titer and this is currently pending.  I discussed these results with the patient.  I am not finding a clear cause " for his symptoms but his lab work-up does not reveal anything significantly abnormal to suggest need for further evaluation with imaging or hospitalization.  He is vitally stable and I think his symptoms can be managed in the outpatient setting.  He likely has an underlying viral syndrome contributing to symptoms.  He was agreeable to plan of resting, lots of fluids, ibuprofen or Tylenol for body aches/headache.  He will receive a call if Lyme titer is positive.  If symptoms are improved in the next 2 days he should follow-up in the clinic for recheck.  I did go over warning signs and symptoms of when to return to the ED.  All questions were answered and patient was comfortable with this plan and with discharge at this time.      I have reviewed the nursing notes.     I have reviewed the findings, diagnosis, plan and need for follow up with the patient.     Discharge Medication List as of 9/13/2019  6:27 PM             Final diagnoses:   Fatigue   Chills   Viral syndrome   Body aches             Allergies:  Allergies   Allergen Reactions     No Known Allergies        Problem List:    Patient Active Problem List    Diagnosis Date Noted     Seasonal allergic rhinitis due to other allergic trigger 07/30/2019     Priority: Medium     Post-nasal drip 07/30/2019     Priority: Medium     Increased frequency of urination 07/30/2019     Priority: Medium     S/P laparoscopic cholecystectomy 02/13/2017     Priority: Medium     Diverticulosis of large intestine 01/01/2017     Priority: Medium     External hemorrhoids 01/01/2017     Priority: Medium     SVT (supraventricular tachycardia) (H) 05/27/2015     Priority: Medium     NASIR (obstructive sleep apnea) 12/15/2014     Priority: Medium     [place] 11/20/2014 AHI 5.2 RDI 11.8        Heartburn      Priority: Medium     Morbid obesity (H)      Priority: Medium     Hypertension      Priority: Medium     Hyperlipidemia      Priority: Medium     Diagnosis updated by automated  process. Provider to review and confirm.       Asthma      Priority: Medium     Imo Update utility       Fatty liver disease, nonalcoholic      Priority: Medium     Generalized anxiety disorder      Priority: Medium     Diagnosis updated by automated process. Provider to review and confirm.       Panic      Priority: Medium     Chronic low back pain      Priority: Medium     fall injury, fell through stairs-hit concrete       Lumbosacral neuritis 01/28/2013     Priority: Medium     CARDIOVASCULAR SCREENING; LDL GOAL LESS THAN 130 02/02/2011     Priority: Medium     Major depressive disorder, single episode, moderate (H) 02/02/2011     Priority: Medium     Displacement of intervertebral disc, site unspecified, without myelopathy 02/12/2007     Priority: Medium     Tobacco use disorder 11/21/2006     Priority: Medium     Pain in joint, forearm 06/25/2006     Priority: Medium     Degeneration of lumbar or lumbosacral intervertebral disc 08/10/2005     Priority: Medium        Past Medical History:    Past Medical History:   Diagnosis Date     Chronic low back pain      Depression      Diverticulitis      Fatty liver disease, nonalcoholic      Former smoker      MARISOL (generalised anxiety disorder)      Hyperlipaemia      Hypertension      Morbid obesity (H)      OA (osteoarthritis)      NASIR (obstructive sleep apnea) 11/20/2014     Panic      SVT (supraventricular tachycardia) (H)      Unspecified sinusitis (chronic)        Past Surgical History:    Past Surgical History:   Procedure Laterality Date     APPENDECTOMY  07/25/09     COLONOSCOPY N/A 12/30/2016    Procedure: COMBINED COLONOSCOPY, SINGLE OR MULTIPLE BIOPSY/POLYPECTOMY BY BIOPSY;  Surgeon: Rikki Jenkins MD;  Location:  GI     ESOPHAGOSCOPY, GASTROSCOPY, DUODENOSCOPY (EGD), COMBINED N/A 12/30/2016    Procedure: COMBINED ESOPHAGOSCOPY, GASTROSCOPY, DUODENOSCOPY (EGD), BIOPSY SINGLE OR MULTIPLE;  Surgeon: Rikki Jenkins MD;  Location:  GI     EXCISE  MASS FOOT Right 2015    Procedure: EXCISE MASS FOOT;  Surgeon: Ramo Decker DPM;  Location: [facility] OR     HC COLONOSCOPY W SNARE REMOVAL TUMOR/POLYP/LESION       LAPAROSCOPIC CHOLECYSTECTOMY N/A 2017    Procedure: LAPAROSCOPIC CHOLECYSTECTOMY;  Surgeon: Toby Bishop MD;  Location: [facility] OR       Family History:    Family History   Problem Relation Age of Onset     Connective Tissue Disorder Mother         sjogrens     Depression Mother         on meds     Neurologic Disorder Mother         restless leg, migraines     Thyroid Disease Mother      C.A.D. Mother      Obesity Mother         Az-en-Y, snores     Obesity Father         snores     Cancer Paternal Grandmother      Obesity Paternal Grandfather        Social History:  Marital Status:  Single [1]  Social History     Tobacco Use     Smoking status: Former Smoker     Packs/day: 0.75     Years: 13.00     Pack years: 9.75     Types: Cigarettes     Last attempt to quit: 4/15/2013     Years since quittin.4     Smokeless tobacco: Never Used     Tobacco comment: using electric cigarette   Substance Use Topics     Alcohol use: No     Drug use: Yes     Types: Marijuana     Comment: marijuana occasional use        Medications:      ondansetron (ZOFRAN ODT) 4 MG ODT tab   oxyCODONE (ROXICODONE) 5 MG tablet   Acetaminophen (TYLENOL PO)   aspirin 81 MG EC tablet   atorvastatin (LIPITOR) 10 MG tablet   IBUPROFEN PO   lisinopril-hydrochlorothiazide (PRINZIDE/ZESTORETIC) 10-12.5 MG tablet   omeprazole (PRILOSEC) 40 MG capsule   order for DME   order for DME   PROAIR  (90 BASE) MCG/ACT inhaler   tamsulosin (FLOMAX) 0.4 MG capsule         Review of Systems   All other systems reviewed and are negative.      Physical Exam   BP: 128/78  Pulse: 87  Heart Rate: 95  Temp: 96.6  F (35.9  C)  Resp: 20  SpO2: 99 %      Physical Exam   Constitutional: He is oriented to person, place, and time. No distress.   Morbidly obese.   HENT:   Head: Normocephalic and  atraumatic.   Right Ear: External ear normal.   Left Ear: External ear normal.   Nose: Nose normal.   Mouth/Throat: Oropharynx is clear and moist. No oropharyngeal exudate.   Mild bilateral scleral jaundice.   Eyes: Pupils are equal, round, and reactive to light. Conjunctivae and EOM are normal. Right eye exhibits no discharge. Left eye exhibits no discharge. No scleral icterus.   Neck: Normal range of motion. Neck supple. No thyromegaly present.   Cardiovascular: Normal rate, regular rhythm and normal heart sounds.   No murmur heard.  Pulmonary/Chest: Effort normal and breath sounds normal. No respiratory distress. He has no wheezes. He has no rales. He exhibits no mass and no tenderness.   Abdominal: Soft. Bowel sounds are normal. He exhibits no distension, no ascites and no mass. There is no splenomegaly or hepatomegaly. There is no tenderness. There is no rebound and no guarding.   Musculoskeletal: Normal range of motion. He exhibits no edema, tenderness or deformity.        Right lower leg: Normal. He exhibits no tenderness and no edema.        Left lower leg: Normal. He exhibits no tenderness and no edema.   Lymphadenopathy:     He has no cervical adenopathy.   Neurological: He is alert and oriented to person, place, and time. No cranial nerve deficit.   Skin: Skin is warm and dry. Capillary refill takes less than 2 seconds. No rash noted. He is not diaphoretic. No erythema.   Psychiatric: He has a normal mood and affect. His behavior is normal. Thought content normal.   Nursing note and vitals reviewed.      ED Course        Procedures               Critical Care time:  none               Results for orders placed or performed during the hospital encounter of 09/16/19 (from the past 24 hour(s))   XR Chest 2 Views    Narrative    CHEST TWO VIEWS September 16, 2019 2:20 PM     HISTORY: Tachypnea, fever/chills.    COMPARISON: 2/14/2014.      Impression    IMPRESSION: No acute abnormality. Lungs are well-inflated  and clear.  Heart size is normal.    CELESTINA STRANGE MD   Rapid strep screen   Result Value Ref Range    Specimen Description Throat     Rapid Strep A Screen       NEGATIVE: No Group A streptococcal antigen detected by immunoassay, await culture report.   CBC with platelets differential   Result Value Ref Range    WBC 10.7 4.0 - 11.0 10e9/L    RBC Count 4.67 4.4 - 5.9 10e12/L    Hemoglobin 14.5 13.3 - 17.7 g/dL    Hematocrit 41.0 40.0 - 53.0 %    MCV 88 78 - 100 fl    MCH 31.0 26.5 - 33.0 pg    MCHC 35.4 31.5 - 36.5 g/dL    RDW 12.6 10.0 - 15.0 %    Platelet Count 115 (L) 150 - 450 10e9/L    Diff Method Manual Differential     % Neutrophils 20.0 %    % Lymphocytes 71.0 %    % Monocytes 6.0 %    % Eosinophils 0.0 %    % Basophils 0.0 %    % Band 1.0 %    % Other Cells 2.0 %    Absolute Neutrophil 2.1 1.6 - 8.3 10e9/L    Absolute Lymphocytes 7.6 (H) 0.8 - 5.3 10e9/L    Absolute Monocytes 0.6 0.0 - 1.3 10e9/L    Absolute Eosinophils 0.0 0.0 - 0.7 10e9/L    Absolute Basophils 0.0 0.0 - 0.2 10e9/L    Absolute Bands 0.1 0.0 - 0.6 10e9/L    Absolute Other Cells 0.2 (H) 0 10e9/L    Reactive Lymphs Present     RBC Morphology Normal     Platelet Estimate Confirming automated cell count    Comprehensive metabolic panel   Result Value Ref Range    Sodium 136 133 - 144 mmol/L    Potassium 3.1 (L) 3.4 - 5.3 mmol/L    Chloride 104 94 - 109 mmol/L    Carbon Dioxide 21 20 - 32 mmol/L    Anion Gap 11 3 - 14 mmol/L    Glucose 104 (H) 70 - 99 mg/dL    Urea Nitrogen 7 7 - 30 mg/dL    Creatinine 0.63 (L) 0.66 - 1.25 mg/dL    GFR Estimate >90 >60 mL/min/[1.73_m2]    GFR Estimate If Black >90 >60 mL/min/[1.73_m2]    Calcium 8.2 (L) 8.5 - 10.1 mg/dL    Bilirubin Total 4.0 (H) 0.2 - 1.3 mg/dL    Albumin 3.1 (L) 3.4 - 5.0 g/dL    Protein Total 7.2 6.8 - 8.8 g/dL    Alkaline Phosphatase 412 (H) 40 - 150 U/L     (H) 0 - 70 U/L     (H) 0 - 45 U/L   Lactic acid whole blood   Result Value Ref Range    Lactic Acid 1.6 0.7 - 2.0 mmol/L    TSH with free T4 reflex   Result Value Ref Range    TSH 2.17 0.40 - 4.00 mU/L   CRP inflammation   Result Value Ref Range    CRP Inflammation 43.0 (H) 0.0 - 8.0 mg/L   Erythrocyte sedimentation rate auto   Result Value Ref Range    Sed Rate 20 (H) 0 - 15 mm/h   Blood culture   Result Value Ref Range    Specimen Description Blood Unspecified Site     Special Requests Aerobic and anaerobic bottles received     Culture Micro No growth after 1 hour    Blood culture   Result Value Ref Range    Specimen Description Blood Right Hand     Special Requests Aerobic and anaerobic bottles received     Culture Micro No growth after 1 hour    Mononucleosis screen   Result Value Ref Range    Mononucleosis Screen Positive (A) NEG^Negative   UA reflex to Microscopic and Culture   Result Value Ref Range    Color Urine Maria Isabel     Appearance Urine Clear     Glucose Urine Negative NEG^Negative mg/dL    Bilirubin Urine Negative NEG^Negative    Ketones Urine 20 (A) NEG^Negative mg/dL    Specific Gravity Urine 1.010 1.003 - 1.035    Blood Urine Negative NEG^Negative    pH Urine 6.0 5.0 - 7.0 pH    Protein Albumin Urine Negative NEG^Negative mg/dL    Urobilinogen mg/dL 4.0 (H) 0.0 - 2.0 mg/dL    Nitrite Urine Negative NEG^Negative    Leukocyte Esterase Urine Negative NEG^Negative    Source Unspecified Urine        Medications   0.9% sodium chloride BOLUS (0 mLs Intravenous Stopped 9/16/19 1551)     Followed by   0.9% sodium chloride BOLUS (0 mLs Intravenous Stopped 9/16/19 1642)   ketorolac (TORADOL) injection 30 mg (30 mg Intravenous Given 9/16/19 1554)   0.9% sodium chloride BOLUS (0 mLs Intravenous Stopped 9/16/19 1855)   potassium chloride ER (K-DUR/KLOR-CON M) CR tablet 20 mEq (20 mEq Oral Given 9/16/19 1805)       Assessments & Plan (with Medical Decision Making)'     Infectious mononucleosis  Muscle pain     38 year old male presents for ongoing symptoms for the past 5 days including headache, myalgias, fever, fatigue, episodes of  diaphoresis, nausea, vomiting, and generalized weakness.  Was evaluated in the ED and was told that he had a virus.  Has had troubles eating anything over the past couple days, but states that he does keep up with his fluids.  Denies any dysuria, frequency, urgency, or flank pain.  On exam blood pressure 111/78, pulse 87, temperature 96.6, and oxygen saturation 96% on room air.  Morbidly obese male who appears ill but not toxic.  No real significant abnormalities on his exam.  Some possible mild scleral jaundice.  IV was established.  3 L of IV normal saline given for rehydration purposes as he did have very dry oral mucous membranes.  Patient did urinate a couple times at the end of his ED visit.  He did feel much better after receiving Zofran and Toradol.  Laboratory levels returned with a white blood cell count of 10,700 and significant lymphocytosis.  Hemoglobin stable at 14.5.  Platelet count mildly low at 115,000.  Potassium was borderline low at 3.1, and therefore he was given a dose of oral potassium.  This is likely secondary to his lack of any oral intake over the last 2.5 days.  Renal function normal.  LFTs significantly elevated per above with an ALT of 353 and an AST of 203.  Total bilirubin of 4.  CRP elevated at 43.  TSH and lactic acid level normal.  Blood cultures were initially obtained given my concern for worsening infection.  Given his lymphocytosis and LFT elevation, I did perform a Monospot.  This was positive.  Urinalysis without infection markers present.  Chest x-ray without infiltrate.  Patient without any nuchal rigidity on exam, therefore it did not appear that LP was needed, especially in the setting of a positive mono.  Patient was feeling better by the end of his ED evaluation.  I spent a long time discussing the course of infectious mononucleosis treatment and that it can be much more prolonged in an adult patient.  He will need close clinical follow-up in a work in appointment was  requested with his PCP hopefully in the next 1-2 days.  He will need repeat laboratory testing including a repeat CBC and CMP to ensure potassium normalization and stabilization of his LFTs.  Treat his nausea with Zofran so hopefully can more properly nourished himself.  I did give him oxycodone to use for the myalgias that he can more appropriately sleep.  I encouraged him to use his CPAP at all times while sleeping, of which he has not been doing.  Strict return instructions reviewed with the patient.  Return immediately if symptoms worsening.  I recommended the patient rest at all times.  He should not work or be active.  He states that he is currently laid off, and does not require a note for work.  The patient and his significant other were in agreement with this plan.     I have reviewed the nursing notes.    I have reviewed the findings, diagnosis, plan and need for follow up with the patient.       Discharge Medication List as of 9/16/2019  6:56 PM      START taking these medications    Details   ondansetron (ZOFRAN ODT) 4 MG ODT tab Take 1 tablet (4 mg) by mouth every 8 hours as needed for nausea or vomiting, Disp-10 tablet, R-0, E-Prescribe      oxyCODONE (ROXICODONE) 5 MG tablet Take 0.5-1 tablets (2.5-5 mg) by mouth every 6 hours as needed for pain, Disp-12 tablet, R-0, Local Print             Final diagnoses:   Infectious mononucleosis   Muscle pain     This document serves as a record of services personally performed by Alex Perez PA-C. It was created on their behalf by Whit Woodruff, a trained medical scribe. The creation of this record is based on the provider's personal observations and the statements of the patient. This document has been checked and approved by the attending provider.    Note: Chart documentation done in part with Dragon Voice Recognition software. Although reviewed after completion, some word and grammatical errors may remain.    9/16/2019   Alex Perez PA-C   [facility]  EMERGENCY DEPARTMENT     Alex Perez PA-C  09/17/19 0152

## 2019-09-16 NOTE — DISCHARGE INSTRUCTIONS
It was a pleasure working with you today!  I hope your condition improves rapidly!     Please continue to work on hydration like we discussed.  Also, focus on nourishment by trying to eat small frequent meals.  Please sleep is much as possible to help boost your immune system.  Please also consider wearing her CPAP during this time to support your breathing and ensure that you get restorative sleep given your underlying obstructive sleep apnea.  Please keep your appointment in the clinic for follow-up.  You will need repeat laboratory levels performed.  If you are significantly worsening prior to then, return to the ED for recheck.

## 2019-09-16 NOTE — ED AVS SNAPSHOT
[facility] Emergency Department  [address] [place] [address]  Phone:  [phone]  Fax:  [phone]                                    Farnkie Gomez   MRN: [id number]    Department:  [facility] Emergency Department   Date of Visit:  9/16/2019           After Visit Summary Signature Page    I have received my discharge instructions, and my questions have been answered. I have discussed any challenges I see with this plan with the nurse or doctor.    ..........................................................................................................................................  Patient/Patient Representative Signature      ..........................................................................................................................................  Patient Representative Print Name and Relationship to Patient    ..................................................               ................................................  Date                                   Time    ..........................................................................................................................................  Reviewed by Signature/Title    ...................................................              ..............................................  Date                                               Time          22EPIC Rev 08/18

## 2019-09-17 ENCOUNTER — PATIENT OUTREACH (OUTPATIENT)
Dept: CARE COORDINATION | Facility: CLINIC | Age: 39
End: 2019-09-17

## 2019-09-17 LAB — B BURGDOR IGG+IGM SER QL: 0.22 (ref 0–0.89)

## 2019-09-17 NOTE — PROGRESS NOTES
"Clinic Care Coordination Contact  Clinic Care Coordination Contact  OUTREACH  Referral Information:  Referral Source: ED Follow-Up  Primary Diagnosis: Other (include Comment box)(Viral illness, mono)  Chief Complaint   Patient presents with     Clinic Care Coordination - Initial   Clinic Utilization  Difficulty keeping appointments:: No  Compliance Concerns: No  Utilization    Last refreshed: 9/17/2019  7:37 AM:  Hospital Admissions 0           Last refreshed: 9/17/2019  7:37 AM:  ED Visits 2           Last refreshed: 9/17/2019  7:37 AM:  No Show Count (past year) 0              Current as of: 9/17/2019  7:37 AM          Clinical Concerns:  Current Medical Concerns:  Called patient, introduced self and role. Patient has copy of discharge summary. His only question on his care is \"is [facility] in network?\" CC RN advised he must call his insurance to check on coverage. Patient stated he will do this. Confirmed his upcoming appointment. Patient continues to have poor sleep, muscle aches, and nausea. Taking zofran and oxycodone per sig. Patient will call clinic before appointment should he have questions or concerns about prescriptions or symptoms.     Current Behavioral Concerns: none    Education Provided to patient: RN CC educated about Care Coordination Services, discharge instructions, medications reviewed and follow up    Pain  Pain (GOAL):: Yes  Type: Acute (<3mo)  Location of chronic pain:: muscle pain  Radiating: No  Progression: Unchanged  Description of pain: Aching  Description: Unable to perform most daily activities (chores, hobbies, social activities, driving)  Alleviating Factors: Pain Medication  Health Maintenance Reviewed: Not assessed  Clinical Pathway: None    Medication Management:  As above.     Functional Status:na  Living Situation:na  Diet/Exercise/Sleep:  Food Insecurity: No  Tube Feeding: No  Exercise:: Currently not exercising  Significant changes in sleep pattern (GOAL): " Yes    Transportation:  Transportation concerns (GOAL):: No  Transportation means:: Regular car     Psychosocial:  Informal Support system:: Family  Financial/Insurance:   Financial/Insurance concerns (GOAL):: No     Resources and Interventions:  Current Resources: pending appointment with PCP, discharge summary.  Community Resources: [facility]   Goals: na   Patient/Caregiver understanding: yes   Future Appointments              In 2 days Alvin Barrett PA-C [facility] [place]      Plan:   1. Patient will follow discharge summary.  2. Patient will follow up sooner should symptoms worsen, change or patient feels there is a need further care.  3. No further Care Coordination needs identified at this time. Patient may be referred to Care Coordination in the future if additional needs arise.  Pt encouraged to contact Care Coordinator through the clinic if situation changes and assistance is needed. No follow-up planned.    JULIA Chavez RN Clinic Care Coordinator   [place], [place], [place]  Phone: [phone]

## 2019-09-17 NOTE — RESULT ENCOUNTER NOTE
Final result for Lyme Disease Bhakti with reflex to WB Serum is NEGATIVE.    No change in treatment per [facility] ED Lab Result protocol.

## 2019-09-18 LAB
BACTERIA SPEC CULT: NORMAL
SPECIMEN SOURCE: NORMAL

## 2019-09-19 ENCOUNTER — OFFICE VISIT (OUTPATIENT)
Dept: FAMILY MEDICINE | Facility: OTHER | Age: 39
End: 2019-09-19
Payer: MEDICAID

## 2019-09-19 VITALS
OXYGEN SATURATION: 98 % | SYSTOLIC BLOOD PRESSURE: 126 MMHG | WEIGHT: 315 LBS | DIASTOLIC BLOOD PRESSURE: 78 MMHG | RESPIRATION RATE: 28 BRPM | TEMPERATURE: 97.3 F | BODY MASS INDEX: 48.82 KG/M2 | HEART RATE: 96 BPM

## 2019-09-19 DIAGNOSIS — G47.33 OSA (OBSTRUCTIVE SLEEP APNEA): Primary | ICD-10-CM

## 2019-09-19 DIAGNOSIS — I47.10 SVT (SUPRAVENTRICULAR TACHYCARDIA) (H): ICD-10-CM

## 2019-09-19 DIAGNOSIS — B27.89 OTHER INFECTIOUS MONONUCLEOSIS WITH OTHER COMPLICATION: ICD-10-CM

## 2019-09-19 DIAGNOSIS — G44.209 TENSION HEADACHE: ICD-10-CM

## 2019-09-19 DIAGNOSIS — B17.9 ACUTE VIRAL HEPATITIS, UNSPECIFIED VIRAL HEPATITIS TYPE: ICD-10-CM

## 2019-09-19 LAB
ALBUMIN SERPL-MCNC: 3.2 G/DL (ref 3.4–5)
ALP SERPL-CCNC: 518 U/L (ref 40–150)
ALT SERPL W P-5'-P-CCNC: 389 U/L (ref 0–70)
ANION GAP SERPL CALCULATED.3IONS-SCNC: 7 MMOL/L (ref 3–14)
AST SERPL W P-5'-P-CCNC: 214 U/L (ref 0–45)
BILIRUB SERPL-MCNC: 1.7 MG/DL (ref 0.2–1.3)
BUN SERPL-MCNC: 8 MG/DL (ref 7–30)
CALCIUM SERPL-MCNC: 8.3 MG/DL (ref 8.5–10.1)
CHLORIDE SERPL-SCNC: 107 MMOL/L (ref 94–109)
CO2 SERPL-SCNC: 24 MMOL/L (ref 20–32)
CREAT SERPL-MCNC: 0.74 MG/DL (ref 0.66–1.25)
ERYTHROCYTE [DISTWIDTH] IN BLOOD BY AUTOMATED COUNT: 14 % (ref 10–15)
GFR SERPL CREATININE-BSD FRML MDRD: >90 ML/MIN/{1.73_M2}
GLUCOSE SERPL-MCNC: 100 MG/DL (ref 70–99)
HCT VFR BLD AUTO: 40.6 % (ref 40–53)
HGB BLD-MCNC: 13.7 G/DL (ref 13.3–17.7)
MCH RBC QN AUTO: 30.6 PG (ref 26.5–33)
MCHC RBC AUTO-ENTMCNC: 33.7 G/DL (ref 31.5–36.5)
MCV RBC AUTO: 91 FL (ref 78–100)
PLATELET # BLD AUTO: 141 10E9/L (ref 150–450)
POTASSIUM SERPL-SCNC: 3.8 MMOL/L (ref 3.4–5.3)
PROT SERPL-MCNC: 7.6 G/DL (ref 6.8–8.8)
RBC # BLD AUTO: 4.47 10E12/L (ref 4.4–5.9)
SODIUM SERPL-SCNC: 138 MMOL/L (ref 133–144)
WBC # BLD AUTO: 7.6 10E9/L (ref 4–11)

## 2019-09-19 PROCEDURE — 99214 OFFICE O/P EST MOD 30 MIN: CPT | Performed by: PHYSICIAN ASSISTANT

## 2019-09-19 PROCEDURE — 85027 COMPLETE CBC AUTOMATED: CPT | Performed by: PHYSICIAN ASSISTANT

## 2019-09-19 PROCEDURE — 86665 EPSTEIN-BARR CAPSID VCA: CPT | Performed by: PHYSICIAN ASSISTANT

## 2019-09-19 PROCEDURE — 86665 EPSTEIN-BARR CAPSID VCA: CPT | Mod: 91 | Performed by: PHYSICIAN ASSISTANT

## 2019-09-19 PROCEDURE — 36415 COLL VENOUS BLD VENIPUNCTURE: CPT | Performed by: PHYSICIAN ASSISTANT

## 2019-09-19 PROCEDURE — 80053 COMPREHEN METABOLIC PANEL: CPT | Performed by: PHYSICIAN ASSISTANT

## 2019-09-19 RX ORDER — OXYCODONE HYDROCHLORIDE 5 MG/1
2.5-5 TABLET ORAL EVERY 6 HOURS PRN
Qty: 12 TABLET | Refills: 0 | Status: SHIPPED | OUTPATIENT
Start: 2019-09-19 | End: 2019-10-11

## 2019-09-19 RX ORDER — ONDANSETRON 4 MG/1
4 TABLET, ORALLY DISINTEGRATING ORAL EVERY 8 HOURS PRN
Qty: 10 TABLET | Refills: 0 | Status: SHIPPED | OUTPATIENT
Start: 2019-09-19 | End: 2019-10-11

## 2019-09-19 ASSESSMENT — PAIN SCALES - GENERAL: PAINLEVEL: SEVERE PAIN (6)

## 2019-09-20 LAB
EBV VCA IGG SER QL IA: <0.2 AI (ref 0–0.8)
EBV VCA IGM SER QL IA: >4 AI (ref 0–0.8)

## 2019-09-22 LAB
BACTERIA SPEC CULT: NO GROWTH
BACTERIA SPEC CULT: NO GROWTH
Lab: NORMAL
Lab: NORMAL
SPECIMEN SOURCE: NORMAL
SPECIMEN SOURCE: NORMAL

## 2019-09-23 DIAGNOSIS — G44.209 TENSION HEADACHE: ICD-10-CM

## 2019-09-23 DIAGNOSIS — B27.89 OTHER INFECTIOUS MONONUCLEOSIS WITH OTHER COMPLICATION: ICD-10-CM

## 2019-09-23 DIAGNOSIS — I47.10 SVT (SUPRAVENTRICULAR TACHYCARDIA) (H): ICD-10-CM

## 2019-09-23 DIAGNOSIS — G47.33 OSA (OBSTRUCTIVE SLEEP APNEA): ICD-10-CM

## 2019-09-23 DIAGNOSIS — B17.9 ACUTE VIRAL HEPATITIS, UNSPECIFIED VIRAL HEPATITIS TYPE: ICD-10-CM

## 2019-09-23 LAB
ALBUMIN SERPL-MCNC: 3.4 G/DL (ref 3.4–5)
ALBUMIN UR-MCNC: NEGATIVE MG/DL
ALP SERPL-CCNC: 349 U/L (ref 40–150)
ALT SERPL W P-5'-P-CCNC: 152 U/L (ref 0–70)
ANION GAP SERPL CALCULATED.3IONS-SCNC: 7 MMOL/L (ref 3–14)
APPEARANCE UR: CLEAR
AST SERPL W P-5'-P-CCNC: 61 U/L (ref 0–45)
BILIRUB SERPL-MCNC: 1 MG/DL (ref 0.2–1.3)
BILIRUB UR QL STRIP: NEGATIVE
BUN SERPL-MCNC: 8 MG/DL (ref 7–30)
CALCIUM SERPL-MCNC: 8.3 MG/DL (ref 8.5–10.1)
CHLORIDE SERPL-SCNC: 105 MMOL/L (ref 94–109)
CO2 SERPL-SCNC: 27 MMOL/L (ref 20–32)
COLOR UR AUTO: YELLOW
CREAT SERPL-MCNC: 0.77 MG/DL (ref 0.66–1.25)
GFR SERPL CREATININE-BSD FRML MDRD: >90 ML/MIN/{1.73_M2}
GLUCOSE SERPL-MCNC: 97 MG/DL (ref 70–99)
GLUCOSE UR STRIP-MCNC: NEGATIVE MG/DL
HGB UR QL STRIP: NEGATIVE
KETONES UR STRIP-MCNC: NEGATIVE MG/DL
LEUKOCYTE ESTERASE UR QL STRIP: NEGATIVE
NITRATE UR QL: NEGATIVE
PH UR STRIP: 7.5 PH (ref 5–7)
POTASSIUM SERPL-SCNC: 4.2 MMOL/L (ref 3.4–5.3)
PROT SERPL-MCNC: 7.6 G/DL (ref 6.8–8.8)
SODIUM SERPL-SCNC: 139 MMOL/L (ref 133–144)
SOURCE: ABNORMAL
SP GR UR STRIP: 1.01 (ref 1–1.03)
UROBILINOGEN UR STRIP-ACNC: 0.2 EU/DL (ref 0.2–1)

## 2019-09-23 PROCEDURE — 81003 URINALYSIS AUTO W/O SCOPE: CPT | Performed by: PHYSICIAN ASSISTANT

## 2019-09-23 PROCEDURE — 36415 COLL VENOUS BLD VENIPUNCTURE: CPT | Performed by: PHYSICIAN ASSISTANT

## 2019-09-23 PROCEDURE — 80053 COMPREHEN METABOLIC PANEL: CPT | Performed by: PHYSICIAN ASSISTANT

## 2019-10-11 ENCOUNTER — ANCILLARY PROCEDURE (OUTPATIENT)
Dept: GENERAL RADIOLOGY | Facility: CLINIC | Age: 39
End: 2019-10-11
Attending: FAMILY MEDICINE
Payer: COMMERCIAL

## 2019-10-11 ENCOUNTER — TELEPHONE (OUTPATIENT)
Dept: FAMILY MEDICINE | Facility: CLINIC | Age: 39
End: 2019-10-11

## 2019-10-11 ENCOUNTER — OFFICE VISIT (OUTPATIENT)
Dept: FAMILY MEDICINE | Facility: CLINIC | Age: 39
End: 2019-10-11
Payer: COMMERCIAL

## 2019-10-11 VITALS
RESPIRATION RATE: 16 BRPM | HEART RATE: 76 BPM | WEIGHT: 315 LBS | TEMPERATURE: 98.9 F | DIASTOLIC BLOOD PRESSURE: 88 MMHG | BODY MASS INDEX: 49.61 KG/M2 | SYSTOLIC BLOOD PRESSURE: 124 MMHG

## 2019-10-11 DIAGNOSIS — M25.562 ACUTE PAIN OF LEFT KNEE: ICD-10-CM

## 2019-10-11 DIAGNOSIS — Z23 NEED FOR PROPHYLACTIC VACCINATION AND INOCULATION AGAINST INFLUENZA: ICD-10-CM

## 2019-10-11 DIAGNOSIS — M25.562 ACUTE PAIN OF LEFT KNEE: Primary | ICD-10-CM

## 2019-10-11 PROCEDURE — 73562 X-RAY EXAM OF KNEE 3: CPT | Mod: LT

## 2019-10-11 PROCEDURE — 90471 IMMUNIZATION ADMIN: CPT | Performed by: FAMILY MEDICINE

## 2019-10-11 PROCEDURE — 99214 OFFICE O/P EST MOD 30 MIN: CPT | Mod: 25 | Performed by: FAMILY MEDICINE

## 2019-10-11 PROCEDURE — 90686 IIV4 VACC NO PRSV 0.5 ML IM: CPT | Performed by: FAMILY MEDICINE

## 2019-10-11 RX ORDER — OXYCODONE HYDROCHLORIDE 5 MG/1
2.5-5 TABLET ORAL EVERY 6 HOURS PRN
Qty: 12 TABLET | Refills: 0 | Status: SHIPPED | OUTPATIENT
Start: 2019-10-11 | End: 2019-10-21

## 2019-10-11 RX ORDER — IBUPROFEN 800 MG/1
800 TABLET, FILM COATED ORAL EVERY 8 HOURS PRN
COMMUNITY
Start: 2019-10-11 | End: 2022-12-16

## 2019-10-11 ASSESSMENT — ANXIETY QUESTIONNAIRES
4. TROUBLE RELAXING: NOT AT ALL
5. BEING SO RESTLESS THAT IT IS HARD TO SIT STILL: NOT AT ALL
7. FEELING AFRAID AS IF SOMETHING AWFUL MIGHT HAPPEN: NOT AT ALL
3. WORRYING TOO MUCH ABOUT DIFFERENT THINGS: NOT AT ALL
7. FEELING AFRAID AS IF SOMETHING AWFUL MIGHT HAPPEN: NOT AT ALL
GAD7 TOTAL SCORE: 1
2. NOT BEING ABLE TO STOP OR CONTROL WORRYING: NOT AT ALL
1. FEELING NERVOUS, ANXIOUS, OR ON EDGE: SEVERAL DAYS
GAD7 TOTAL SCORE: 1
6. BECOMING EASILY ANNOYED OR IRRITABLE: NOT AT ALL
GAD7 TOTAL SCORE: 1

## 2019-10-11 ASSESSMENT — PATIENT HEALTH QUESTIONNAIRE - PHQ9
SUM OF ALL RESPONSES TO PHQ QUESTIONS 1-9: 2
10. IF YOU CHECKED OFF ANY PROBLEMS, HOW DIFFICULT HAVE THESE PROBLEMS MADE IT FOR YOU TO DO YOUR WORK, TAKE CARE OF THINGS AT HOME, OR GET ALONG WITH OTHER PEOPLE: NOT DIFFICULT AT ALL
SUM OF ALL RESPONSES TO PHQ QUESTIONS 1-9: 2

## 2019-10-11 ASSESSMENT — PAIN SCALES - GENERAL: PAINLEVEL: MILD PAIN (3)

## 2019-10-11 NOTE — TELEPHONE ENCOUNTER
Patient returned call and was given message from below. He had no further questions at this time.

## 2019-10-11 NOTE — PROGRESS NOTES
"Subjective     Frankie Gomez is a 38 year old male who presents to clinic today for the following health issues:    HPI   Musculoskeletal problem/pain      Duration: 4 days ([place],10/8/19)    Description  Location: Left knee. Patient was facing his truck with the hitch between his legs. The hitch caught his shorts as he stepped backwards and he fell towards his left with twisting of the knee. He had immediate pain and feels swollen on the left side of his knee. He also felt a \"pop\".     Intensity:  Severe (7/10)    Accompanying signs and symptoms: Radiation of pain to lower leg, instability of knee and swelling. Denies numbness, tingling.    History  Previous similar problem: YES- Has popped his kneecap before, but not this bad.  Previous evaluation:  none    Precipitating or alleviating factors:  Trauma or overuse: YES - as above.  Aggravating factors include: walking and bending the knee, applying pressure    Therapies tried and outcome: Ice. Acetaminophen and Ibuprofen, has been taking leftover Oxycodone he had. Ibuprofen tends to help.    He has crutches available to him.    He has no other concerns today. He is willing to get his flu shot.    Patient Active Problem List   Diagnosis     Degeneration of lumbar or lumbosacral intervertebral disc     Pain in joint, forearm     Tobacco use disorder     Displacement of intervertebral disc, site unspecified, without myelopathy     CARDIOVASCULAR SCREENING; LDL GOAL LESS THAN 130     Major depressive disorder, single episode, moderate (H)     Lumbosacral neuritis     Heartburn     Morbid obesity (H)     Hypertension     Hyperlipidemia     Asthma     Fatty liver disease, nonalcoholic     Generalized anxiety disorder     Panic     Chronic low back pain     NASIR (obstructive sleep apnea)     SVT (supraventricular tachycardia) (H)     Diverticulosis of large intestine     External hemorrhoids     S/P laparoscopic cholecystectomy     Seasonal allergic rhinitis due to " other allergic trigger     Post-nasal drip     Increased frequency of urination     Tension headache     Other infectious mononucleosis with other complication     Past Surgical History:   Procedure Laterality Date     APPENDECTOMY  09     COLONOSCOPY N/A 2016    Procedure: COMBINED COLONOSCOPY, SINGLE OR MULTIPLE BIOPSY/POLYPECTOMY BY BIOPSY;  Surgeon: Rikki Jenkins MD;  [facility]     ESOPHAGOSCOPY, GASTROSCOPY, DUODENOSCOPY (EGD), COMBINED N/A 2016    Procedure: COMBINED ESOPHAGOSCOPY, GASTROSCOPY, DUODENOSCOPY (EGD), BIOPSY SINGLE OR MULTIPLE;  Surgeon: Rikki Jenkins MD;  [facility]     EXCISE MASS FOOT Right 2015    Procedure: EXCISE MASS FOOT;  Surgeon: Ramo Decker DPM;  [facility]     HC COLONOSCOPY W SNARE REMOVAL TUMOR/POLYP/LESION       LAPAROSCOPIC CHOLECYSTECTOMY N/A 2017    Procedure: LAPAROSCOPIC CHOLECYSTECTOMY;  Surgeon: Toby Bishop MD;  [facility]       Social History     Tobacco Use     Smoking status: Former Smoker     Packs/day: 0.75     Years: 13.00     Pack years: 9.75     Types: Cigarettes     Last attempt to quit: 4/15/2013     Years since quittin.4     Smokeless tobacco: Never Used     Tobacco comment: using electric cigarette   Substance Use Topics     Alcohol use: No     Family History   Problem Relation Age of Onset     Connective Tissue Disorder Mother         sjogrens     Depression Mother         on meds     Neurologic Disorder Mother         restless leg, migraines     Thyroid Disease Mother      C.A.D. Mother      Obesity Mother         Az-en-Y, snores     Obesity Father         snores     Cancer Paternal Grandmother      Obesity Paternal Grandfather          Current Outpatient Medications   Medication Sig Dispense Refill     Acetaminophen (TYLENOL PO) Take 1,000 mg by mouth daily Reported on 2017       aspirin 81 MG EC tablet Take 1 tablet (81 mg) by mouth daily 90 tablet 3     atorvastatin  (LIPITOR) 10 MG tablet Take 1 tablet (10 mg) by mouth daily 90 tablet 1     ibuprofen (ADVIL/MOTRIN) 800 MG tablet Take 1 tablet (800 mg) by mouth every 8 hours as needed Reported on 2/13/2017       lisinopril-hydrochlorothiazide (PRINZIDE/ZESTORETIC) 10-12.5 MG tablet Take 1 tablet by mouth daily 90 tablet 1     omeprazole (PRILOSEC) 40 MG capsule Take 40 mg by mouth as needed Reported on 2/13/2017       PROAIR  (90 BASE) MCG/ACT inhaler        tamsulosin (FLOMAX) 0.4 MG capsule Take 1 capsule (0.4 mg) by mouth daily 90 capsule 1     Allergies   Allergen Reactions     No Known Allergies        Reviewed and updated as needed this visit by Provider  Tobacco  Allergies  Meds  Problems  Med Hx  Surg Hx  Fam Hx         Review of Systems   ROS COMP: Constitutional, derm, msk, cardiovascular, pulmonary, neuro, systems are negative, except as otherwise noted.      Objective    /88   Pulse 76   Temp 98.9  F (37.2  C) (Temporal)   Resp 16   Wt (!) 160.6 kg (354 lb)   BMI 49.61 kg/m    Body mass index is 49.61 kg/m .  Physical Exam   General: Obese male. Appears well and in no acute distress.  Cardiovascular: Regular rate and rhythm, normal S1 and S2 without murmur. No extra heartsounds or friction rub. Radial pulses present and equal bilaterally.  Respiratory: Lungs clear to auscultation bilaterally. No wheezing or crackles. No prolonged expiration. Symmetrical chest rise.  Knee Musculoskeletal Exam: Positive Bharati's test of the left knee.  Palpation over medial joint line. No overlying erythema or ecchymosis. No significant effusion noted. No tenderness to palpation over lateral joint line. Full ROM with extension and flexion. Negative Lachman's and posterior drawer. No laxity with varus and valgus stress. No patellar apprehension or pain with patellar compression. Pes anserine bursa non-tender compared to right.  Derm: No suspicious lesions or rashes over exposed surfaces.    Diagnostic Test  "Results:  Labs reviewed in Epic  Xray (personal read of left knee xray series)-x-ray series: Appears negative for acute fracture.  There is a sesamoid bone in the posterior knee.        Assessment & Plan     1. Acute pain of left knee: Traumatic injury. Neurovascularly intact. Xrays do not show an obvious fracture.  Will await radiology read.. Concern on exam given pain with Bharati's and palpation of the medial joint line along with \"popping sensation\" for a meniscal injury.  I am recommending MRI at this time. He has crutches. Continue alternating tylenol and ibuprofen. I gave him a short course of oxycodone (12 tabs) after checking the . Recommend icing the knee, elevation. Return if worsening. Follow-up MRI results for further decision making regarding injury.  - XR Knee Left 3 Views; Future    2. Need for prophylactic vaccination and inoculation against influenza: Patient is amenable to receiving flu shot today.  - INFLUENZA VACCINE IM > 6 MONTHS VALENT IIV4 [64141]  - Vaccine Administration, Initial [14564]     BMI:   Estimated body mass index is 49.61 kg/m  as calculated from the following:    Height as of 8/16/19: 1.799 m (5' 10.83\").    Weight as of this encounter: 160.6 kg (354 lb).   Weight management plan: Discussed healthy diet and exercise guidelines    No follow-ups on file.    Oziel Saleem MD  [facility]    Answers for HPI/ROS submitted by the patient on 10/11/2019   If you checked off any problems, how difficult have these problems made it for you to do your work, take care of things at home, or get along with other people?: Not difficult at all  PHQ9 TOTAL SCORE: 2  MARISOL 7 TOTAL SCORE: 1    "

## 2019-10-11 NOTE — PATIENT INSTRUCTIONS
Important Takeaway Points From This Visit:    I would like for you to get an MRI as I do not see a fracture in your knee and I am concerned about the meniscus.    You can continue to alternate tylenol and ibuprofen for pain.    I would use the crutches until I get the MRI results back.    You got your flu shot today.      As always, please call with any questions or concerns. I look forward to seeing you again soon!    Take care,  Dr. Saleem    Your current medication list is printed. Please keep this with you - it is helpful to bring this current list to any other medical appointments. It can also be helpful if you ever go to the emergency room or hospital.    If you had lab testing today we will call you with the results. The phone number we will call with your results is # [phone] (home) . If this is not the best number please call our clinic and change the number.    If you need any refills, please call your pharmacy and they will contact us.    If you have any further concerns or wish to schedule another appointment, please call our office at [phone].    If you have a medical emergency, please call [phone].    Thank you for coming to [facility]!

## 2019-10-12 ASSESSMENT — PATIENT HEALTH QUESTIONNAIRE - PHQ9: SUM OF ALL RESPONSES TO PHQ QUESTIONS 1-9: 2

## 2019-10-12 ASSESSMENT — ANXIETY QUESTIONNAIRES: GAD7 TOTAL SCORE: 1

## 2019-10-16 ENCOUNTER — HOSPITAL ENCOUNTER (OUTPATIENT)
Dept: MRI IMAGING | Facility: CLINIC | Age: 39
Discharge: HOME | End: 2019-10-16
Attending: FAMILY MEDICINE | Admitting: FAMILY MEDICINE
Payer: COMMERCIAL

## 2019-10-16 DIAGNOSIS — M25.562 ACUTE PAIN OF LEFT KNEE: ICD-10-CM

## 2019-10-16 PROCEDURE — 73721 MRI JNT OF LWR EXTRE W/O DYE: CPT | Mod: LT

## 2019-10-17 ENCOUNTER — TELEPHONE (OUTPATIENT)
Dept: FAMILY MEDICINE | Facility: CLINIC | Age: 39
End: 2019-10-17

## 2019-10-17 ENCOUNTER — TELEPHONE (OUTPATIENT)
Dept: FAMILY MEDICINE | Facility: OTHER | Age: 39
End: 2019-10-17

## 2019-10-17 DIAGNOSIS — M22.42 CHONDROMALACIA OF PATELLA, LEFT: ICD-10-CM

## 2019-10-17 DIAGNOSIS — M25.562 ACUTE PAIN OF LEFT KNEE: Primary | ICD-10-CM

## 2019-10-17 NOTE — TELEPHONE ENCOUNTER
"Spoke to patient.     States the oxycodone was helpful to him. He was able to walk without his cane. Prescription refill was declined by Dr. Olson and by Alvin. Alvin placed a referral for orthopedic specialty. Patient still wants pain medication. States \"I'm just suppose to writhe in pain all weekend?!\"      Scheduled him with Dr. Olson tomorrow to discuss pain medication options.     Next 5 appointments (look out 90 days)    Oct 18, 2019  2:00 PM CDT  [facility] [address], [address]  [place] [id number] [phone]   Oct 21, 2019  8:20 AM CDT  Office Visit with Alvin Barrett PA-C  [facility] [place] ([facility] [place]) [address] [id number] [phone]        Elizabeth Kerns RN BSN    "

## 2019-10-17 NOTE — TELEPHONE ENCOUNTER
Patient would like to speak with LG. He states that he scheduled with Wesley in [place] Friday because he could not get into see Alvin. He had x rays done and the bones look good, so he had MRI yesterday. He states that someone looked at them but patient does not agree with the diagnosis. He scheduled follow-up with LG Monday to discuss. Was given 12 oxy's from Dr Olson. He ran out and is still in a lot of pain. Dr Olson denied refill so he wants to see if LG will refill this until he is able to be seen.

## 2019-10-17 NOTE — TELEPHONE ENCOUNTER
"Pt informed and stated that he does not think time will be enough.  He states there is so much pressure when he bends his knee.  \"It feels like something is not in place.\"  I asked if pt wanted to be referred to ortho or physical therapy or something?  He would like the Provider to advise what could be done next.  ------    Notes recorded by Oziel Saleem MD on 10/17/2019 at 1:32 PM CDT  Please call with results.     Your MRI returned and showed loss of cartilage on your knee cap; however, all of your ligaments are intact and there is no structural damage. Your symptoms should improve with time.    Please call the clinic with any questions you may have.     Have a great day,    Dr. Olson  "

## 2019-10-17 NOTE — TELEPHONE ENCOUNTER
We will have nursing staff triage this.  I will not be refilling controlled substances for other providers for a problem that I have not seen or diagnosed in clinic.  If he has ongoing orthopedic concerns his best bet would be to get into see 1 of our orthopedic specialist.  Electronically signed:    Alvin Garcia PA-C

## 2019-10-17 NOTE — PROGRESS NOTES
Subjective     Frankie Gomez is a 38 year old male who presents to clinic today for the following health issues:    HPI   Rash  Onset: Months    Description:   Location: abdomen  Character: blotchy, raised, red  Itching (Pruritis): YES    Progression of Symptoms:  Improved but, not reolved    Accompanying Signs & Symptoms:  Fever: no   Body aches or joint pain: no   Sore throat symptoms: no   Recent cold symptoms: no     History:   Previous similar rash: YES    Precipitating factors:   Exposure to similar rash: no   New exposures: None   Recent travel: no     Alleviating factors:  none    Therapies Tried and outcome: Keflex     Patient Active Problem List   Diagnosis     Degeneration of lumbar or lumbosacral intervertebral disc     Pain in joint, forearm     Tobacco use disorder     Displacement of intervertebral disc, site unspecified, without myelopathy     CARDIOVASCULAR SCREENING; LDL GOAL LESS THAN 130     Major depressive disorder, single episode, moderate (H)     Lumbosacral neuritis     Heartburn     Morbid obesity (H)     Hypertension     Hyperlipidemia     Asthma     Fatty liver disease, nonalcoholic     Generalized anxiety disorder     Panic     Chronic low back pain     NASIR (obstructive sleep apnea)     SVT (supraventricular tachycardia) (H)     Diverticulosis of large intestine     External hemorrhoids     S/P laparoscopic cholecystectomy     Seasonal allergic rhinitis due to other allergic trigger     Post-nasal drip     Increased frequency of urination     Tension headache     Other infectious mononucleosis with other complication     Past Surgical History:   Procedure Laterality Date     APPENDECTOMY  07/25/09     COLONOSCOPY N/A 12/30/2016    Procedure: COMBINED COLONOSCOPY, SINGLE OR MULTIPLE BIOPSY/POLYPECTOMY BY BIOPSY;  Surgeon: Rikki Jenkins MD;  [facility]     ESOPHAGOSCOPY, GASTROSCOPY, DUODENOSCOPY (EGD), COMBINED N/A 12/30/2016    Procedure: COMBINED ESOPHAGOSCOPY, GASTROSCOPY,  DUODENOSCOPY (EGD), BIOPSY SINGLE OR MULTIPLE;  Surgeon: Rikki Jenkins MD;  [facility]     EXCISE MASS FOOT Right 2015    Procedure: EXCISE MASS FOOT;  Surgeon: Ramo Decker DPM;  [facility]     HC COLONOSCOPY W SNARE REMOVAL TUMOR/POLYP/LESION       LAPAROSCOPIC CHOLECYSTECTOMY N/A 2017    Procedure: LAPAROSCOPIC CHOLECYSTECTOMY;  Surgeon: Toby Bishop MD;  [facility]       Social History     Tobacco Use     Smoking status: Former Smoker     Packs/day: 0.75     Years: 13.00     Pack years: 9.75     Types: Cigarettes     Last attempt to quit: 4/15/2013     Years since quittin.5     Smokeless tobacco: Never Used     Tobacco comment: using electric cigarette   Substance Use Topics     Alcohol use: No     Family History   Problem Relation Age of Onset     Connective Tissue Disorder Mother         sjogrens     Depression Mother         on meds     Neurologic Disorder Mother         restless leg, migraines     Thyroid Disease Mother      C.A.D. Mother      Obesity Mother         Az-en-Y, snores     Obesity Father         snores     Cancer Paternal Grandmother      Obesity Paternal Grandfather          Current Outpatient Medications   Medication Sig Dispense Refill     Acetaminophen (TYLENOL PO) Take 1,000 mg by mouth daily Reported on 2017       aspirin 81 MG EC tablet Take 1 tablet (81 mg) by mouth daily 90 tablet 3     atorvastatin (LIPITOR) 10 MG tablet Take 1 tablet (10 mg) by mouth daily 90 tablet 1     ibuprofen (ADVIL/MOTRIN) 800 MG tablet Take 1 tablet (800 mg) by mouth every 8 hours as needed Reported on 2017       lisinopril-hydrochlorothiazide (PRINZIDE/ZESTORETIC) 10-12.5 MG tablet Take 1 tablet by mouth daily 90 tablet 1     methylPREDNISolone (MEDROL DOSEPAK) 4 MG tablet therapy pack Follow package directions. 21 tablet 0     omeprazole (PRILOSEC) 40 MG capsule Take 40 mg by mouth as needed Reported on 2017       PROAIR  (90 BASE)  MCG/ACT inhaler        tamsulosin (FLOMAX) 0.4 MG capsule Take 1 capsule (0.4 mg) by mouth daily 90 capsule 1     Allergies   Allergen Reactions     No Known Allergies      Recent Labs   Lab Test 09/23/19  1454 09/19/19  1129 09/16/19  1458 09/13/19  1504 07/30/19  0957 02/21/18  0811 01/30/17  0904  06/01/16  1745  08/12/13  1121   A1C  --   --   --   --   --   --   --   --  5.5  --  5.2   LDL  --   --   --   --  108* 88 177*  --   --    < > 154*   HDL  --   --   --   --  37* 42 35*  --   --    < > 34*   TRIG  --   --   --   --  151* 95 187*  --   --    < > 96   * 389* 353*  --  27  --   --    < > 37  --  34   CR 0.77 0.74 0.63* 0.66 0.73 0.79  --    < > 0.75   < > 0.68   GFRESTIMATED >90 >90 >90 >90 >90 >90  --    < > >90  Non  GFR Calc     < > >90   GFRESTBLACK >90 >90 >90 >90 >90 >90  --    < > >90   GFR Calc     < > >90   POTASSIUM 4.2 3.8 3.1* 3.6 4.0 3.5  --    < > 3.6   < > 3.9   TSH  --   --  2.17 0.93 1.86  --   --   --  2.38  --  1.41    < > = values in this interval not displayed.      BP Readings from Last 3 Encounters:   10/21/19 126/88   10/11/19 124/88   09/19/19 126/78    Wt Readings from Last 3 Encounters:   10/21/19 (!) 157.2 kg (346 lb 9.6 oz)   10/11/19 (!) 160.6 kg (354 lb)   09/19/19 (!) 158 kg (348 lb 4.8 oz)                    Reviewed and updated as needed this visit by Provider         Review of Systems   ROS COMP: Constitutional, HEENT, cardiovascular, pulmonary, GI, , musculoskeletal, neuro, skin, endocrine and psych systems are negative, except as otherwise noted.      Objective    /88   Pulse 80   Temp 97.5  F (36.4  C) (Temporal)   Resp 16   Wt (!) 157.2 kg (346 lb 9.6 oz)   BMI 48.58 kg/m    Body mass index is 48.58 kg/m .  Physical Exam   GENERAL: healthy, alert and no distress  NECK: no adenopathy, no asymmetry, masses, or scars and thyroid normal to palpation  RESP: lungs clear to auscultation - no rales, rhonchi or wheezes  CV:  "regular rate and rhythm, normal S1 S2, no S3 or S4, no murmur, click or rub, no peripheral edema and peripheral pulses strong  ABDOMEN: soft, nontender, no hepatosplenomegaly, no masses and bowel sounds normal  MS: no gross musculoskeletal defects noted, no edema  SKIN: abdomen with what appears to be a contact dermatitis more on the left lower than right.  Diagnostic Test Results:  Labs reviewed in Epic  No results found for this or any previous visit (from the past 24 hour(s)).        Assessment & Plan     1. Acute pain of left knee  Would advise that he follow-up with ortho ffor ccompletion of concerns  - methylPREDNISolone (MEDROL DOSEPAK) 4 MG tablet therapy pack; Follow package directions.  Dispense: 21 tablet; Refill: 0    2. Allergic contact dermatitis, unspecified trigger  - DERMATOLOGY REFERRAL  - methylPREDNISolone (MEDROL DOSEPAK) 4 MG tablet therapy pack; Follow package directions.  Dispense: 21 tablet; Refill: 0     BMI:   Estimated body mass index is 48.58 kg/m  as calculated from the following:    Height as of 8/16/19: 1.799 m (5' 10.83\").    Weight as of this encounter: 157.2 kg (346 lb 9.6 oz).   Weight management plan: Discussed healthy diet and exercise guidelines    Return in about 6 months (around 4/21/2020) for Medication Recheck, recheck of current condition.    Alvin Garcia PA-C  [facility]    "

## 2019-10-21 ENCOUNTER — OFFICE VISIT (OUTPATIENT)
Dept: FAMILY MEDICINE | Facility: OTHER | Age: 39
End: 2019-10-21
Payer: COMMERCIAL

## 2019-10-21 VITALS
RESPIRATION RATE: 16 BRPM | WEIGHT: 315 LBS | TEMPERATURE: 97.5 F | SYSTOLIC BLOOD PRESSURE: 126 MMHG | HEART RATE: 80 BPM | BODY MASS INDEX: 48.58 KG/M2 | DIASTOLIC BLOOD PRESSURE: 88 MMHG

## 2019-10-21 DIAGNOSIS — L23.9 ALLERGIC CONTACT DERMATITIS, UNSPECIFIED TRIGGER: ICD-10-CM

## 2019-10-21 DIAGNOSIS — M25.562 ACUTE PAIN OF LEFT KNEE: Primary | ICD-10-CM

## 2019-10-21 PROCEDURE — 99214 OFFICE O/P EST MOD 30 MIN: CPT | Performed by: PHYSICIAN ASSISTANT

## 2019-10-21 RX ORDER — METHYLPREDNISOLONE 4 MG
TABLET, DOSE PACK ORAL
Qty: 21 TABLET | Refills: 0 | Status: SHIPPED | OUTPATIENT
Start: 2019-10-21 | End: 2020-12-31

## 2019-10-21 ASSESSMENT — PAIN SCALES - GENERAL: PAINLEVEL: MILD PAIN (3)

## 2019-10-23 ENCOUNTER — OFFICE VISIT (OUTPATIENT)
Dept: ORTHOPEDICS | Facility: OTHER | Age: 39
End: 2019-10-23
Payer: COMMERCIAL

## 2019-10-23 VITALS
SYSTOLIC BLOOD PRESSURE: 136 MMHG | BODY MASS INDEX: 45.1 KG/M2 | DIASTOLIC BLOOD PRESSURE: 74 MMHG | HEIGHT: 70 IN | WEIGHT: 315 LBS | TEMPERATURE: 98.3 F

## 2019-10-23 DIAGNOSIS — S80.02XA CONTUSION OF LEFT KNEE, INITIAL ENCOUNTER: ICD-10-CM

## 2019-10-23 DIAGNOSIS — M22.2X2 PATELLOFEMORAL PAIN SYNDROME OF LEFT KNEE: Primary | ICD-10-CM

## 2019-10-23 PROCEDURE — 20610 DRAIN/INJ JOINT/BURSA W/O US: CPT | Mod: LT | Performed by: ORTHOPAEDIC SURGERY

## 2019-10-23 PROCEDURE — 99203 OFFICE O/P NEW LOW 30 MIN: CPT | Mod: 25 | Performed by: ORTHOPAEDIC SURGERY

## 2019-10-23 RX ADMIN — TRIAMCINOLONE ACETONIDE 80 MG: 40 INJECTION, SUSPENSION INTRA-ARTICULAR; INTRAMUSCULAR at 14:27

## 2019-10-23 ASSESSMENT — MIFFLIN-ST. JEOR: SCORE: 2500.7

## 2019-10-23 ASSESSMENT — PAIN SCALES - GENERAL: PAINLEVEL: MILD PAIN (3)

## 2019-10-23 NOTE — LETTER
10/23/2019         RE: Frankie Gomez  [address] [facility] [address]        Dear Colleague,    Thank you for referring your patient, Frankie Gomez, to the [facility]. Please see a copy of my visit note below.    Large Joint Injection/Arthocentesis: L knee joint  Date/Time: 10/23/2019 2:27 PM  Performed by: Rosa Rm PA-C  Authorized by: Chaparro Rea MD     Indications:  Pain  Needle Size:  22 G  Approach:  Anteromedial  Location:  Knee      Medications:  80 mg triamcinolone 40 MG/ML  Procedure discussed: discussed risks, benefits, and alternatives    Consent Given by:  Patient  Timeout: timeout called immediately prior to procedure    Prep: patient was prepped and draped in usual sterile fashion          HPI: Patient reports on 10/8/2019 he was straddling the hitch of his truck when he had fell over onto his left side.  He reports the hitch grabbed the inside of his shorts as he had moved over and his leg stayed in the same position as he fell over.  He did sense a popping sensation.  He did land on the cement.  He states he hurt right away.  His pain is primarily on the medial side of his left knee.  He reports although he is better from the initial injury, he is still uncomfortable.  He indicates it hurts just to put shoes and socks on.  When he fully extends he does have a sensation of popping but this happens only occasionally.  Patient has had a history of the kneecap subluxation.  He thought this particular injury feels a little different due to the medial sided pain.    Patient's past medical, surgical, social and family histories reviewed.       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     Unspecified sinusitis (chronic)        Past Surgical History:   Procedure Laterality Date     APPENDECTOMY  07/25/09     COLONOSCOPY N/A 12/30/2016    Procedure: COMBINED COLONOSCOPY, SINGLE OR MULTIPLE BIOPSY/POLYPECTOMY BY BIOPSY;  Surgeon: Rikki Jenkins MD;  [facility]     ESOPHAGOSCOPY, GASTROSCOPY, DUODENOSCOPY (EGD), COMBINED N/A 12/30/2016    Procedure: COMBINED ESOPHAGOSCOPY, GASTROSCOPY, DUODENOSCOPY (EGD), BIOPSY SINGLE OR MULTIPLE;  Surgeon: Rikki Jenkins MD;  [facility]     EXCISE MASS FOOT Right 2/13/2015    Procedure: EXCISE MASS FOOT;  Surgeon: Ramo Decker DPM;  [facility]      COLONOSCOPY W SNARE REMOVAL TUMOR/POLYP/LESION  1998     LAPAROSCOPIC CHOLECYSTECTOMY N/A 2/13/2017    Procedure: LAPAROSCOPIC CHOLECYSTECTOMY;  Surgeon: Toby Bishop MD;  Location:  OR       Home Medications:  Prior to Admission medications    Medication Sig Start Date End Date Taking? Authorizing Provider   Acetaminophen (TYLENOL PO) Take 1,000 mg by mouth daily Reported on 2/13/2017    Reported, Patient   aspirin 81 MG EC tablet Take 1 tablet (81 mg) by mouth daily 2/19/18   Erika Brian MD   atorvastatin (LIPITOR) 10 MG tablet Take 1 tablet (10 mg) by mouth daily 7/30/19   Alvin Barrett PA-C   ibuprofen (ADVIL/MOTRIN) 800 MG tablet Take 1 tablet (800 mg) by mouth every 8 hours as needed Reported on 2/13/2017 10/11/19   Oziel Saleem MD   lisinopril-hydrochlorothiazide (PRINZIDE/ZESTORETIC) 10-12.5 MG tablet Take 1 tablet by mouth daily 7/30/19   Alvin Barrett PA-C   methylPREDNISolone (MEDROL DOSEPAK) 4 MG tablet therapy pack Follow package directions. 10/21/19   Alvin Barrett PA-C   omeprazole (PRILOSEC) 40 MG capsule Take 40 mg by mouth as needed Reported on 2/13/2017 10/11/16   Reported, Patient  "  PROAIR  (90 BASE) MCG/ACT inhaler  18   Reported, Patient   tamsulosin (FLOMAX) 0.4 MG capsule Take 1 capsule (0.4 mg) by mouth daily 19   Alvin Barrett PA-C       Allergies   Allergen Reactions     No Known Allergies        Social History     Occupational History     Occupation: IT     Employer: [employer]   Tobacco Use     Smoking status: Former Smoker     Packs/day: 0.75     Years: 13.00     Pack years: 9.75     Types: Cigarettes     Last attempt to quit: 4/15/2013     Years since quittin.5     Smokeless tobacco: Never Used     Tobacco comment: using electric cigarette   Substance and Sexual Activity     Alcohol use: No     Drug use: Yes     Types: Marijuana     Comment: marijuana occasional use     Sexual activity: Yes     Partners: Female     Birth control/protection: None       Family History   Problem Relation Age of Onset     Connective Tissue Disorder Mother         sjogrens     Depression Mother         on meds     Neurologic Disorder Mother         restless leg, migraines     Thyroid Disease Mother      C.A.D. Mother      Obesity Mother         Az-en-Y, snores     Obesity Father         snores     Cancer Paternal Grandmother      Obesity Paternal Grandfather        REVIEW OF SYSTEMS    General: Negative for fever or fatigue    Psych:  positive history of anxiety     Ophthalmic:  Corrective lenses?  No    ENT:  Hearing difficulty? No    CV: negative for chest pain, venous insufficiency, no history of MI or stroke    Endocrine:  negative diabetes     Urology:  negative kidney disease    Resp:  Normal respiratory effort, no history of pulmonary disease or asthma    Skin: negative for cuts/sores or redness    Musculoskeletal: as above    Neurologic:negative for numbness/tingling    Hematologic: negative for bleeding disorder, does not use of prescription anticoagulants         Physical Exam:    Vitals: /74   Temp 98.3  F (36.8  C) (Temporal)   Ht 1.778 m (5' 10\")   Wt (!) " 156.9 kg (346 lb)   BMI 49.65 kg/m     BMI= Body mass index is 49.65 kg/m .    GENERAL APPEARANCE: Healthy, alert, no distress    SKIN:  negative for suspicious lesions or rashes    NEURO: Normal strength and tone, mentation intact and speech normal    PSYCH:   Mentation appears Normal and affect normal/bright    RESPIRATORY: negative for respiratory distress.    EYES: negative for Conjunctivitis    Cardiovascular: No vascular discoloration      GAIT: antalgic    JOINT/EXTREMITIES:  Left knee with minimal swelling when compared with the right knee.  There is a small amount of bruising that is quite faded on the medial aspect of his left knee.  Is no significant redness.  Patient's patella alignment of bilateral knees reflects lateral divergence.  Palpation reveals tenderness approximately mid medial joint line which extends to the MFC .  Patient's range of motion, he is able to provide full extension.  In flexion, patient is approximately 110 degrees which is approximately 30 degrees less than his right likely due to swelling and apprehension  Collateral ligaments are intact with valgus and varus stress testing.  ACL and PCL are intact  Patient with appropriate quad tone  With a significant limp.    Radiographs: MRI images are reviewed with the patient.  The meniscus appeared healthy.  Patellofemoral compartment does have fluid surrounding.  Patient does have divergence of the patella laterally.  The medial aspect of the medial femoral condyle does have some edema present.  This is not mentioned in report.  Independent visualization of the films was made.     Impression:      ICD-10-CM    1. Patellofemoral pain syndrome of left knee M22.2X2 Large Joint Injection/Arthocentesis: L knee joint   2. Contusion of left knee, initial encounter S80.02XA Large Joint Injection/Arthocentesis: L knee joint     Patient with knee injury 13 days ago.  He has persistent pain near medial joint line    Plan:  Patient advised of above  findings.   Patient offered injection to help with his current pain.  Intra-articular injection as well as some product to soft tissue around area of contusion is accomplished today.  Patient still limping post injection but states he is just babying his knee.  Ropivacaine and lidocaine mixed with 2 ml of Kenalog  Patient to follow up if pain persists.     BP Readings from Last 1 Encounters:   10/23/19 136/74     BP noted to be well controlled today in office.     Scribed by  Rosa Rm PA-C   10/23/2019  2:49 PM      I attest I have seen and evaluated the patient.  I agree with above impression and plan.    Chaparro Rea MD    Again, thank you for allowing me to participate in the care of your patient.        Sincerely,        Chaparro Rea MD

## 2019-10-23 NOTE — PROGRESS NOTES
Large Joint Injection/Arthocentesis: L knee joint  Date/Time: 10/23/2019 2:27 PM  Performed by: Rosa Rm PA-C  Authorized by: Chaparro Rea MD     Indications:  Pain  Needle Size:  22 G  Approach:  Anteromedial  Location:  Knee      Medications:  80 mg triamcinolone 40 MG/ML  Procedure discussed: discussed risks, benefits, and alternatives    Consent Given by:  Patient  Timeout: timeout called immediately prior to procedure    Prep: patient was prepped and draped in usual sterile fashion          HPI: Patient reports on 10/8/2019 he was straddling the hitch of his truck when he had fell over onto his left side.  He reports the hitch grabbed the inside of his shorts as he had moved over and his leg stayed in the same position as he fell over.  He did sense a popping sensation.  He did land on the cement.  He states he hurt right away.  His pain is primarily on the medial side of his left knee.  He reports although he is better from the initial injury, he is still uncomfortable.  He indicates it hurts just to put shoes and socks on.  When he fully extends he does have a sensation of popping but this happens only occasionally.  Patient has had a history of the kneecap subluxation.  He thought this particular injury feels a little different due to the medial sided pain.    Patient's past medical, surgical, social and family histories reviewed.       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     Unspecified sinusitis (chronic)        Past Surgical History:   Procedure  Laterality Date     APPENDECTOMY  07/25/09     COLONOSCOPY N/A 12/30/2016    Procedure: COMBINED COLONOSCOPY, SINGLE OR MULTIPLE BIOPSY/POLYPECTOMY BY BIOPSY;  Surgeon: Rikki Jenkins MD;  [facility]     ESOPHAGOSCOPY, GASTROSCOPY, DUODENOSCOPY (EGD), COMBINED N/A 12/30/2016    Procedure: COMBINED ESOPHAGOSCOPY, GASTROSCOPY, DUODENOSCOPY (EGD), BIOPSY SINGLE OR MULTIPLE;  Surgeon: Rikki Jenkins MD;  [facility]     EXCISE MASS FOOT Right 2/13/2015    Procedure: EXCISE MASS FOOT;  Surgeon: Ramo Decker DPM;  [facility]      COLONOSCOPY W SNARE REMOVAL TUMOR/POLYP/LESION  1998     LAPAROSCOPIC CHOLECYSTECTOMY N/A 2/13/2017    Procedure: LAPAROSCOPIC CHOLECYSTECTOMY;  Surgeon: Toby Bishop MD;  Location:  OR       Home Medications:  Prior to Admission medications    Medication Sig Start Date End Date Taking? Authorizing Provider   Acetaminophen (TYLENOL PO) Take 1,000 mg by mouth daily Reported on 2/13/2017    Reported, Patient   aspirin 81 MG EC tablet Take 1 tablet (81 mg) by mouth daily 2/19/18   Erika Brian MD   atorvastatin (LIPITOR) 10 MG tablet Take 1 tablet (10 mg) by mouth daily 7/30/19   Alvin Barrett PA-C   ibuprofen (ADVIL/MOTRIN) 800 MG tablet Take 1 tablet (800 mg) by mouth every 8 hours as needed Reported on 2/13/2017 10/11/19   Oziel Saleem MD   lisinopril-hydrochlorothiazide (PRINZIDE/ZESTORETIC) 10-12.5 MG tablet Take 1 tablet by mouth daily 7/30/19   Alvin Barrett PA-C   methylPREDNISolone (MEDROL DOSEPAK) 4 MG tablet therapy pack Follow package directions. 10/21/19   Alvin Barrett PA-C   omeprazole (PRILOSEC) 40 MG capsule Take 40 mg by mouth as needed Reported on 2/13/2017 10/11/16   Reported, Patient   PROAIR  (90 BASE) MCG/ACT inhaler  2/7/18   Reported, Patient   tamsulosin (FLOMAX) 0.4 MG capsule Take 1 capsule (0.4 mg) by mouth daily 7/30/19   Alvin Barrett PA-C       Allergies   Allergen Reactions     No Known Allergies   "      Social History     Occupational History     Occupation: IT     Employer: [employer]   Tobacco Use     Smoking status: Former Smoker     Packs/day: 0.75     Years: 13.00     Pack years: 9.75     Types: Cigarettes     Last attempt to quit: 4/15/2013     Years since quittin.5     Smokeless tobacco: Never Used     Tobacco comment: using electric cigarette   Substance and Sexual Activity     Alcohol use: No     Drug use: Yes     Types: Marijuana     Comment: marijuana occasional use     Sexual activity: Yes     Partners: Female     Birth control/protection: None       Family History   Problem Relation Age of Onset     Connective Tissue Disorder Mother         sjogrens     Depression Mother         on meds     Neurologic Disorder Mother         restless leg, migraines     Thyroid Disease Mother      C.A.D. Mother      Obesity Mother         Za-en-Y, snores     Obesity Father         snores     Cancer Paternal Grandmother      Obesity Paternal Grandfather        REVIEW OF SYSTEMS    General: Negative for fever or fatigue    Psych:  positive history of anxiety     Ophthalmic:  Corrective lenses?  No    ENT:  Hearing difficulty? No    CV: negative for chest pain, venous insufficiency, no history of MI or stroke    Endocrine:  negative diabetes     Urology:  negative kidney disease    Resp:  Normal respiratory effort, no history of pulmonary disease or asthma    Skin: negative for cuts/sores or redness    Musculoskeletal: as above    Neurologic:negative for numbness/tingling    Hematologic: negative for bleeding disorder, does not use of prescription anticoagulants         Physical Exam:    Vitals: /74   Temp 98.3  F (36.8  C) (Temporal)   Ht 1.778 m (5' 10\")   Wt (!) 156.9 kg (346 lb)   BMI 49.65 kg/m    BMI= Body mass index is 49.65 kg/m .    GENERAL APPEARANCE: Healthy, alert, no distress    SKIN:  negative for suspicious lesions or rashes    NEURO: Normal strength and tone, mentation intact and " speech normal    PSYCH:   Mentation appears Normal and affect normal/bright    RESPIRATORY: negative for respiratory distress.    EYES: negative for Conjunctivitis    Cardiovascular: No vascular discoloration      GAIT: antalgic    JOINT/EXTREMITIES:  Left knee with minimal swelling when compared with the right knee.  There is a small amount of bruising that is quite faded on the medial aspect of his left knee.  Is no significant redness.  Patient's patella alignment of bilateral knees reflects lateral divergence.  Palpation reveals tenderness approximately mid medial joint line which extends to the MFC .  Patient's range of motion, he is able to provide full extension.  In flexion, patient is approximately 110 degrees which is approximately 30 degrees less than his right likely due to swelling and apprehension  Collateral ligaments are intact with valgus and varus stress testing.  ACL and PCL are intact  Patient with appropriate quad tone  With a significant limp.    Radiographs: MRI images are reviewed with the patient.  The meniscus appeared healthy.  Patellofemoral compartment does have fluid surrounding.  Patient does have divergence of the patella laterally.  The medial aspect of the medial femoral condyle does have some edema present.  This is not mentioned in report.  Independent visualization of the films was made.     Impression:      ICD-10-CM    1. Patellofemoral pain syndrome of left knee M22.2X2 Large Joint Injection/Arthocentesis: L knee joint   2. Contusion of left knee, initial encounter S80.02XA Large Joint Injection/Arthocentesis: L knee joint     Patient with knee injury 13 days ago.  He has persistent pain near medial joint line    Plan:  Patient advised of above findings.   Patient offered injection to help with his current pain.  Intra-articular injection as well as some product to soft tissue around area of contusion is accomplished today.  Patient still limping post injection but states he  is just babying his knee.  Ropivacaine and lidocaine mixed with 2 ml of Kenalog  Patient to follow up if pain persists.     BP Readings from Last 1 Encounters:   10/23/19 136/74     BP noted to be well controlled today in office.     Scribed by  Rosa Rm PA-C   10/23/2019  2:49 PM      I attest I have seen and evaluated the patient.  I agree with above impression and plan.    Chaparro Rea MD

## 2019-10-24 RX ORDER — TRIAMCINOLONE ACETONIDE 40 MG/ML
80 INJECTION, SUSPENSION INTRA-ARTICULAR; INTRAMUSCULAR
Status: SHIPPED | OUTPATIENT
Start: 2019-10-23

## 2019-12-18 ENCOUNTER — OFFICE VISIT (OUTPATIENT)
Dept: URGENT CARE | Facility: RETAIL CLINIC | Age: 39
End: 2019-12-18
Payer: COMMERCIAL

## 2019-12-18 VITALS
SYSTOLIC BLOOD PRESSURE: 152 MMHG | DIASTOLIC BLOOD PRESSURE: 113 MMHG | HEART RATE: 101 BPM | TEMPERATURE: 98.7 F | OXYGEN SATURATION: 98 %

## 2019-12-18 DIAGNOSIS — J02.9 ACUTE PHARYNGITIS, UNSPECIFIED ETIOLOGY: ICD-10-CM

## 2019-12-18 DIAGNOSIS — J02.0 STREP THROAT: Primary | ICD-10-CM

## 2019-12-18 LAB — S PYO AG THROAT QL IA.RAPID: ABNORMAL

## 2019-12-18 PROCEDURE — 99213 OFFICE O/P EST LOW 20 MIN: CPT | Performed by: NURSE PRACTITIONER

## 2019-12-18 PROCEDURE — 87880 STREP A ASSAY W/OPTIC: CPT | Mod: QW | Performed by: NURSE PRACTITIONER

## 2019-12-18 RX ORDER — AMOXICILLIN 500 MG/1
500 CAPSULE ORAL 2 TIMES DAILY
Qty: 20 CAPSULE | Refills: 0 | Status: SHIPPED | OUTPATIENT
Start: 2019-12-18 | End: 2019-12-28

## 2019-12-18 ASSESSMENT — ENCOUNTER SYMPTOMS
FATIGUE: 0
CHILLS: 0
VOMITING: 0
SORE THROAT: 1
NAUSEA: 1
COUGH: 0
SLEEP DISTURBANCE: 0
ADENOPATHY: 1
MYALGIAS: 0
DIAPHORESIS: 0
ABDOMINAL PAIN: 0
APPETITE CHANGE: 1
HEADACHES: 0
RHINORRHEA: 0
TROUBLE SWALLOWING: 1
FEVER: 0
SINUS PRESSURE: 0

## 2019-12-18 NOTE — LETTER
[facility]  [address]  [facility] [address]  Phone: [phone]        2019    Frankie CEDILLO Patricia  [address] [place] [address] [phone] (home)     :     1980      To Whom it May Concern:    This patient was seen in clinic 2019 due to illness. Please excuse medical related absence. Return to work 2019 at the earliest.    Please contact me for questions or concerns.    Sincerely,    Coby Calzada, FANIP-BC

## 2019-12-18 NOTE — PROGRESS NOTES
Chief Complaint   Patient presents with     Pharyngitis     started yesterday     SUBJECTIVE:  Frankie Gomez is a 39 year old male presenting with a chief complaint of a sore throat, headache, gi upset for 2 days. His blood pressure is also elevated, but he is in pain and has not taken any medicines today.  Course of illness: gradual onset and worsening.  Severity: moderate  Treatment measures tried include: Tylenol/Ibuprofen.  Predisposing factors include: has had strep before.    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  omeprazole (PRILOSEC) 40 MG capsule, Take 40 mg by mouth as needed Reported on 2/13/2017  PROAIR  (90 BASE) MCG/ACT inhaler,   tamsulosin (FLOMAX) 0.4 MG capsule, Take 1 capsule (0.4 mg) by mouth daily  [DISCONTINUED] hydrochlorothiazide 12.5 MG TABS, Take 1 tablet (12.5 mg) by mouth daily    triamcinolone (KENALOG-40) injection 80 mg      Social History     Tobacco Use     Smoking status: Former  Smoker     Packs/day: 0.75     Years: 13.00     Pack years: 9.75     Types: Cigarettes     Last attempt to quit: 4/15/2013     Years since quittin.6     Smokeless tobacco: Never Used     Tobacco comment: using electric cigarette   Substance Use Topics     Alcohol use: No     Allergies   Allergen Reactions     No Known Allergies      Review of Systems   Constitutional: Positive for appetite change. Negative for chills, diaphoresis, fatigue and fever.   HENT: Positive for sore throat and trouble swallowing. Negative for congestion, ear pain, postnasal drip, rhinorrhea and sinus pressure.    Respiratory: Negative for cough.    Gastrointestinal: Positive for nausea. Negative for abdominal pain and vomiting.   Musculoskeletal: Negative for myalgias.   Skin: Negative for rash.   Neurological: Negative for headaches.   Hematological: Positive for adenopathy (anterior cervical).   Psychiatric/Behavioral: Negative for sleep disturbance.     OBJECTIVE:   BP (!) 152/113   Pulse 101   Temp 98.7  F (37.1  C) (Oral)   SpO2 98%      Physical Exam  Vitals signs reviewed.   Constitutional:       General: He is in acute distress.      Appearance: Normal appearance. He is ill-appearing.   HENT:      Head: Normocephalic and atraumatic.      Nose: Nose normal.      Mouth/Throat:      Mouth: Mucous membranes are moist.      Pharynx: Oropharyngeal exudate and posterior oropharyngeal erythema present.   Neck:      Musculoskeletal: Normal range of motion and neck supple.   Cardiovascular:      Rate and Rhythm: Normal rate.   Pulmonary:      Effort: Pulmonary effort is normal.      Breath sounds: Normal breath sounds.   Abdominal:      General: Abdomen is flat.      Palpations: Abdomen is soft.   Musculoskeletal: Normal range of motion.   Lymphadenopathy:      Cervical: Cervical adenopathy present.   Skin:     General: Skin is warm and dry.      Findings: No rash.   Neurological:      General: No focal deficit present.      Mental  "Status: He is alert and oriented to person, place, and time.       Rapid Strep test is positive.    ASSESSMENT:    ICD-10-CM    1. Strep throat J02.0 amoxicillin (AMOXIL) 500 MG capsule   2. Acute pharyngitis, unspecified etiology J02.9 RAPID STREP SCREEN     CANCELED: BETA STREP GROUP A R/O CULTURE     PLAN:   Patient Instructions   Antibiotics as directed.  Drink plenty of fluids and rest.  May use salt water gargles- about 8 oz warm water with about 1 teaspoon salt  Sucrets and Cepacol spray are over the counter medications that numb the throat.  Over the counter pain relievers such as tylenol or ibuprofen may be used as needed.   Honey lemon tea helps to soothe the throat. \"Throat Coat\" tea is soothing as well.  Change toothbrush after 24 hours of antibiotics (may soak in 3-6% hydrogen peroxide)  Will be contagious for 24 hours after starting antibiotic  May return to school//work/activities 24 hours after antibiotics are started.  Wash hands frequently and do not share beverages.  Please follow up with primary care provider if symptoms are not improving, worsening or new symptoms or for any adverse reactions to medications.     follow up with primary care provider regarding blood pressure, need to be taking meds consistently.    Follow up with primary care provider with any problems, questions or concerns or if symptoms worsen or fail to improve. Patient agreed to plan and verbalized understanding.    YULIET Waldrop-BC  [facility]  "

## 2019-12-18 NOTE — PATIENT INSTRUCTIONS
"Antibiotics as directed.  Drink plenty of fluids and rest.  May use salt water gargles- about 8 oz warm water with about 1 teaspoon salt  Sucrets and Cepacol spray are over the counter medications that numb the throat.  Over the counter pain relievers such as tylenol or ibuprofen may be used as needed.   Honey lemon tea helps to soothe the throat. \"Throat Coat\" tea is soothing as well.  Change toothbrush after 24 hours of antibiotics (may soak in 3-6% hydrogen peroxide)  Will be contagious for 24 hours after starting antibiotic  May return to school//work/activities 24 hours after antibiotics are started.  Wash hands frequently and do not share beverages.  Please follow up with primary care provider if symptoms are not improving, worsening or new symptoms or for any adverse reactions to medications.     follow up with primary care provider regarding blood pressure, need to be taking meds consistently.  "

## 2020-12-28 NOTE — PROGRESS NOTES
"Frankie Gomez is a 40 year old male who is being evaluated via a billable telephone visit.      The patient has been notified of following:     \"This telephone visit will be conducted via a call between you and your physician/provider. We have found that certain health care needs can be provided without the need for a physical exam.  This service lets us provide the care you need with a short phone conversation.  If a prescription is necessary we can send it directly to your pharmacy.  If lab work is needed we can place an order for that and you can then stop by our lab to have the test done at a later time.    Telephone visits are billed at different rates depending on your insurance coverage. During this emergency period, for some insurers they may be billed the same as an in-person visit.  Please reach out to your insurance provider with any questions.    If during the course of the call the physician/provider feels a telephone visit is not appropriate, you will not be charged for this service.\"    Patient has given verbal consent for Telephone visit?  {YES-NO  Default Yes:4444::\"Yes\"}    What phone number would you like to be contacted at? ***    How would you like to obtain your AVS? {AVS Preference:722759}    Subjective     Frankie Gomez is a 40 year old male who presents via phone visit today for the following health issues:    HPI     {SUPERLIST (Optional):353034}  {PEDS Chronic and Acute Problems (Optional):126706}     {additonal problems for provider to add (Optional):426311}    Review of Systems   {ROS COMP (Optional):334180}       Objective          Vitals:  No vitals were obtained today due to virtual visit.    {GENERAL APPEARANCE:50::\"healthy\",\"alert\",\"no distress\"}  PSYCH: Alert and oriented times 3; coherent speech, normal   rate and volume, able to articulate logical thoughts, able   to abstract reason, no tangential thoughts, no hallucinations   or delusions  His affect is { " ":8469857::\"normal\"}  RESP: No cough, no audible wheezing, able to talk in full sentences  Remainder of exam unable to be completed due to telephone visits    {Diagnostic Test Results (Optional):991328}        Assessment/Plan:    {PROVIDER CHARTING PREFERENCE SOAPO:228548}    Phone call duration:  *** minutes                "

## 2020-12-31 ENCOUNTER — VIRTUAL VISIT (OUTPATIENT)
Dept: FAMILY MEDICINE | Facility: OTHER | Age: 40
End: 2020-12-31
Payer: COMMERCIAL

## 2020-12-31 VITALS
SYSTOLIC BLOOD PRESSURE: 154 MMHG | HEART RATE: 75 BPM | WEIGHT: 315 LBS | RESPIRATION RATE: 16 BRPM | DIASTOLIC BLOOD PRESSURE: 102 MMHG | BODY MASS INDEX: 52.23 KG/M2 | OXYGEN SATURATION: 98 % | TEMPERATURE: 97.8 F

## 2020-12-31 DIAGNOSIS — I10 HTN, GOAL BELOW 140/80: ICD-10-CM

## 2020-12-31 DIAGNOSIS — E66.01 MORBID OBESITY (H): ICD-10-CM

## 2020-12-31 DIAGNOSIS — F41.1 GENERALIZED ANXIETY DISORDER: ICD-10-CM

## 2020-12-31 DIAGNOSIS — G56.03 BILATERAL CARPAL TUNNEL SYNDROME: ICD-10-CM

## 2020-12-31 DIAGNOSIS — I10 ESSENTIAL HYPERTENSION WITH GOAL BLOOD PRESSURE LESS THAN 130/85: ICD-10-CM

## 2020-12-31 DIAGNOSIS — Z13.6 CARDIOVASCULAR SCREENING; LDL GOAL LESS THAN 130: ICD-10-CM

## 2020-12-31 DIAGNOSIS — F12.10 MARIJUANA ABUSE: ICD-10-CM

## 2020-12-31 DIAGNOSIS — M51.379 DEGENERATION OF LUMBAR OR LUMBOSACRAL INTERVERTEBRAL DISC: ICD-10-CM

## 2020-12-31 DIAGNOSIS — F32.1 MAJOR DEPRESSIVE DISORDER, SINGLE EPISODE, MODERATE (H): ICD-10-CM

## 2020-12-31 DIAGNOSIS — E78.5 HYPERLIPIDEMIA, UNSPECIFIED HYPERLIPIDEMIA TYPE: ICD-10-CM

## 2020-12-31 DIAGNOSIS — M72.2 PLANTAR FASCIITIS: ICD-10-CM

## 2020-12-31 DIAGNOSIS — Z13.220 SCREENING FOR HYPERLIPIDEMIA: Primary | ICD-10-CM

## 2020-12-31 LAB
ALBUMIN SERPL-MCNC: 3.9 G/DL (ref 3.4–5)
ALP SERPL-CCNC: 122 U/L (ref 40–150)
ALT SERPL W P-5'-P-CCNC: 32 U/L (ref 0–70)
ANION GAP SERPL CALCULATED.3IONS-SCNC: 4 MMOL/L (ref 3–14)
AST SERPL W P-5'-P-CCNC: 20 U/L (ref 0–45)
BILIRUB SERPL-MCNC: 0.8 MG/DL (ref 0.2–1.3)
BUN SERPL-MCNC: 18 MG/DL (ref 7–30)
CALCIUM SERPL-MCNC: 8.5 MG/DL (ref 8.5–10.1)
CHLORIDE SERPL-SCNC: 111 MMOL/L (ref 94–109)
CHOLEST SERPL-MCNC: 218 MG/DL
CO2 SERPL-SCNC: 25 MMOL/L (ref 20–32)
CREAT SERPL-MCNC: 0.67 MG/DL (ref 0.66–1.25)
ERYTHROCYTE [DISTWIDTH] IN BLOOD BY AUTOMATED COUNT: 12.7 % (ref 10–15)
GFR SERPL CREATININE-BSD FRML MDRD: >90 ML/MIN/{1.73_M2}
GLUCOSE SERPL-MCNC: 105 MG/DL (ref 70–99)
HCT VFR BLD AUTO: 43.9 % (ref 40–53)
HDLC SERPL-MCNC: 37 MG/DL
HGB BLD-MCNC: 14.7 G/DL (ref 13.3–17.7)
LDLC SERPL CALC-MCNC: 154 MG/DL
MCH RBC QN AUTO: 30.6 PG (ref 26.5–33)
MCHC RBC AUTO-ENTMCNC: 33.5 G/DL (ref 31.5–36.5)
MCV RBC AUTO: 91 FL (ref 78–100)
NONHDLC SERPL-MCNC: 181 MG/DL
PLATELET # BLD AUTO: 248 10E9/L (ref 150–450)
POTASSIUM SERPL-SCNC: 4.1 MMOL/L (ref 3.4–5.3)
PROT SERPL-MCNC: 7.6 G/DL (ref 6.8–8.8)
RBC # BLD AUTO: 4.81 10E12/L (ref 4.4–5.9)
SODIUM SERPL-SCNC: 140 MMOL/L (ref 133–144)
TRIGL SERPL-MCNC: 133 MG/DL
TSH SERPL DL<=0.005 MIU/L-ACNC: 1.32 MU/L (ref 0.4–4)
WBC # BLD AUTO: 6 10E9/L (ref 4–11)

## 2020-12-31 PROCEDURE — 85027 COMPLETE CBC AUTOMATED: CPT | Performed by: PHYSICIAN ASSISTANT

## 2020-12-31 PROCEDURE — 36415 COLL VENOUS BLD VENIPUNCTURE: CPT | Performed by: PHYSICIAN ASSISTANT

## 2020-12-31 PROCEDURE — 80061 LIPID PANEL: CPT | Performed by: PHYSICIAN ASSISTANT

## 2020-12-31 PROCEDURE — 84443 ASSAY THYROID STIM HORMONE: CPT | Performed by: PHYSICIAN ASSISTANT

## 2020-12-31 PROCEDURE — 80053 COMPREHEN METABOLIC PANEL: CPT | Performed by: PHYSICIAN ASSISTANT

## 2020-12-31 PROCEDURE — 99214 OFFICE O/P EST MOD 30 MIN: CPT | Performed by: PHYSICIAN ASSISTANT

## 2020-12-31 RX ORDER — TAMSULOSIN HYDROCHLORIDE 0.4 MG/1
0.4 CAPSULE ORAL DAILY
Qty: 90 CAPSULE | Refills: 1 | Status: SHIPPED | OUTPATIENT
Start: 2020-12-31 | End: 2021-05-10

## 2020-12-31 RX ORDER — ATORVASTATIN CALCIUM 10 MG/1
10 TABLET, FILM COATED ORAL DAILY
Qty: 90 TABLET | Refills: 1 | Status: SHIPPED | OUTPATIENT
Start: 2020-12-31 | End: 2021-05-10

## 2020-12-31 RX ORDER — LOSARTAN POTASSIUM AND HYDROCHLOROTHIAZIDE 12.5; 5 MG/1; MG/1
1 TABLET ORAL DAILY
Qty: 90 TABLET | Refills: 1 | Status: SHIPPED | OUTPATIENT
Start: 2020-12-31 | End: 2021-05-10

## 2020-12-31 ASSESSMENT — ANXIETY QUESTIONNAIRES
2. NOT BEING ABLE TO STOP OR CONTROL WORRYING: NOT AT ALL
4. TROUBLE RELAXING: NOT AT ALL
5. BEING SO RESTLESS THAT IT IS HARD TO SIT STILL: NOT AT ALL
7. FEELING AFRAID AS IF SOMETHING AWFUL MIGHT HAPPEN: NOT AT ALL
6. BECOMING EASILY ANNOYED OR IRRITABLE: NOT AT ALL
1. FEELING NERVOUS, ANXIOUS, OR ON EDGE: SEVERAL DAYS
7. FEELING AFRAID AS IF SOMETHING AWFUL MIGHT HAPPEN: NOT AT ALL
3. WORRYING TOO MUCH ABOUT DIFFERENT THINGS: NOT AT ALL
GAD7 TOTAL SCORE: 1

## 2020-12-31 ASSESSMENT — PATIENT HEALTH QUESTIONNAIRE - PHQ9
10. IF YOU CHECKED OFF ANY PROBLEMS, HOW DIFFICULT HAVE THESE PROBLEMS MADE IT FOR YOU TO DO YOUR WORK, TAKE CARE OF THINGS AT HOME, OR GET ALONG WITH OTHER PEOPLE: NOT DIFFICULT AT ALL
SUM OF ALL RESPONSES TO PHQ QUESTIONS 1-9: 3
SUM OF ALL RESPONSES TO PHQ QUESTIONS 1-9: 3

## 2020-12-31 NOTE — PROGRESS NOTES
Subjective     Frankie Gomez is a 40 year old male who presents to clinic today for the following health issues:    History of Present Illness     Asthma:  He presents for follow up of asthma.  He has no cough, no wheezing, and no shortness of breath. He is not using a relief medication. He typically misses taking his controller medication 7 time(s) per week.Patient is aware of the following triggers: exercise or sports. The patient has not had a visit to the Emergency Room, Urgent Care or Hospital due to asthma since the last clinic visit.     Back Pain:  He presents for follow up of back pain. Patient's back pain is a chronic problem.  Location of back pain:  Right lower back, left lower back and left buttock  Description of back pain: dull ache, sharp, shooting, stabbing and other  Back pain spreads: left buttocks and left thigh    Since patient first noticed back pain, pain is: always present, but gets better and worse  Does back pain interfere with his job:  Yes      Mental Health Follow-up:  Patient presents to follow-up on Anxiety.    Patient's anxiety since last visit has been:  Medium  The patient is not having other symptoms associated with anxiety.  Any significant life events: No  Patient is not feeling anxious or having panic attacks.  Patient has no concerns about alcohol or drug use.     Social History  Tobacco Use    Smoking status: Former Smoker      Packs/day: 0.75      Years: 13.00      Pack years: 9.75      Types: Cigarettes      Quit date: 4/15/2013      Years since quittin.7    Smokeless tobacco: Never Used    Tobacco comment: using electric cigarette  Alcohol use: No  Drug use: Yes    Types: Marijuana    Comment: marijuana occasional use      Today's PHQ-9         PHQ-9 Total Score:     (P) 3   PHQ-9 Q9 Thoughts of better off dead/self-harm past 2 weeks :   (P) Not at all   Thoughts of suicide or self harm:      Self-harm Plan:        Self-harm Action:          Safety concerns for self  or others:           Hyperlipidemia:  He presents for follow up of hyperlipidemia.  He is not taking medication to lower cholesterol. He is not having myalgia or other side effects to statin medications.    Hypertension: He presents for follow up of hypertension.  He does not check blood pressure  regularly outside of the clinic. Outside blood pressures have been over 140/90. He does not follow a low salt diet.     He eats 0-1 servings of fruits and vegetables daily.He consumes 2 sweetened beverage(s) daily.He exercises with enough effort to increase his heart rate 9 or less minutes per day.  He is missing 7 dose(s) of medications per week.  He is not taking prescribed medications regularly due to other.       Patient states he's been off these meds a very long time and thinks its time he starts again.     Hyperlipidemia Follow-Up      Are you regularly taking any medication or supplement to lower your cholesterol?   No    Are you having muscle aches or other side effects that you think could be caused by your cholesterol lowering medication?  No    Hypertension Follow-up      Do you check your blood pressure regularly outside of the clinic? No     Are you following a low salt diet? No    Are your blood pressures ever more than 140 on the top number (systolic) OR more   than 90 on the bottom number (diastolic), for example 140/90? No     Medication Followup of Tamsulosin     Taking Medication as prescribed: NO    Side Effects:  None    Medication Helping Symptoms:  NO     Review of Systems   Constitutional, HEENT, cardiovascular, pulmonary, GI, , musculoskeletal, neuro, skin, endocrine and psych systems are negative, except as otherwise noted.      Objective    BP (!) 154/102 (BP Location: Right arm, Patient Position: Chair, Cuff Size: Adult Large)   Pulse 75   Temp 97.8  F (36.6  C) (Temporal)   Resp 16   Wt (!) 165.1 kg (364 lb)   SpO2 98%   BMI 52.23 kg/m    Body mass index is 52.23 kg/m .  Physical Exam    GENERAL: healthy, alert and no distress  NECK: no adenopathy, no asymmetry, masses, or scars and thyroid normal to palpation  RESP: lungs clear to auscultation - no rales, rhonchi or wheezes  CV: regular rate and rhythm, normal S1 S2, no S3 or S4, no murmur, click or rub, no peripheral edema and peripheral pulses strong  ABDOMEN: soft, nontender, no hepatosplenomegaly, no masses and bowel sounds normal  MS: no gross musculoskeletal defects noted, no edema, no evidence of bilateral carpal tunnel syndrome with positive Tinel's and Phalen's test on exam today as noted.  SKIN: no suspicious lesions or rashes to visible skin today.  NEURO: Normal strength and tone, mentation intact and speech normal  PSYCH: mentation appears normal, affect normal/bright    No results found for this or any previous visit (from the past 24 hour(s)).        Assessment & Plan     Frankie was seen today for hypertension and lipids.    Diagnoses and all orders for this visit:    Screening for hyperlipidemia    CARDIOVASCULAR SCREENING; LDL GOAL LESS THAN 130    Major depressive disorder, single episode, moderate (H)  -     TSH with free T4 reflex    Generalized anxiety disorder  -     TSH with free T4 reflex    HTN, goal below 140/80  -     Lipid panel reflex to direct LDL Fasting  -     CBC with platelets  -     Comprehensive metabolic panel  -     losartan-hydrochlorothiazide (HYZAAR) 50-12.5 MG tablet; Take 1 tablet by mouth daily  -     TSH with free T4 reflex    Plantar fasciitis    Bilateral carpal tunnel syndrome  -     GENERAL SURG ADULT REFERRAL; Future    Hyperlipidemia, unspecified hyperlipidemia type  -     Lipid panel reflex to direct LDL Fasting  -     CBC with platelets  -     Comprehensive metabolic panel  -     atorvastatin (LIPITOR) 10 MG tablet; Take 1 tablet (10 mg) by mouth daily    Marijuana use    Essential hypertension with goal blood pressure less than 130/85  -     tamsulosin (FLOMAX) 0.4 MG capsule; Take 1 capsule  (0.4 mg) by mouth daily    Morbid obesity (H)    Degeneration of lumbar or lumbosacral intervertebral disc            Work on weight loss  Regular exercise  Return in about 3 weeks (around 1/21/2021) for Medication Recheck, recheck of current condition, BP Recheck with provider.    JOHN PAUL [facility]    Answers for HPI/ROS submitted by the patient on 12/31/2020   Chronic problems general questions HPI Form  If you checked off any problems, how difficult have these problems made it for you to do your work, take care of things at home, or get along with other people?: Not difficult at all  PHQ9 TOTAL SCORE: 3  MARISOL 7 TOTAL SCORE: 1

## 2021-01-01 ASSESSMENT — PATIENT HEALTH QUESTIONNAIRE - PHQ9: SUM OF ALL RESPONSES TO PHQ QUESTIONS 1-9: 3

## 2021-01-01 ASSESSMENT — ANXIETY QUESTIONNAIRES: GAD7 TOTAL SCORE: 1

## 2021-01-04 ENCOUNTER — TELEPHONE (OUTPATIENT)
Dept: FAMILY MEDICINE | Facility: OTHER | Age: 41
End: 2021-01-04

## 2021-01-04 DIAGNOSIS — G56.03 BILATERAL CARPAL TUNNEL SYNDROME: Primary | ICD-10-CM

## 2021-01-04 NOTE — TELEPHONE ENCOUNTER
Referral for general surgeon for Carpal Tunnel.  Dr. Bishop does not see for this, orthopedics would.  Explained to patient and he states Alvin wanted him to see Dario for something else possibly?  Can you clarify?

## 2021-01-09 ENCOUNTER — HEALTH MAINTENANCE LETTER (OUTPATIENT)
Age: 41
End: 2021-01-09

## 2021-01-11 ENCOUNTER — TRANSFERRED RECORDS (OUTPATIENT)
Dept: HEALTH INFORMATION MANAGEMENT | Facility: CLINIC | Age: 41
End: 2021-01-11

## 2021-01-13 ENCOUNTER — ANCILLARY PROCEDURE (OUTPATIENT)
Dept: GENERAL RADIOLOGY | Facility: CLINIC | Age: 41
End: 2021-01-13
Attending: ORTHOPAEDIC SURGERY
Payer: COMMERCIAL

## 2021-01-13 ENCOUNTER — OFFICE VISIT (OUTPATIENT)
Dept: ORTHOPEDICS | Facility: CLINIC | Age: 41
End: 2021-01-13
Attending: PHYSICIAN ASSISTANT
Payer: COMMERCIAL

## 2021-01-13 VITALS
DIASTOLIC BLOOD PRESSURE: 80 MMHG | WEIGHT: 315 LBS | BODY MASS INDEX: 44.1 KG/M2 | HEIGHT: 71 IN | SYSTOLIC BLOOD PRESSURE: 110 MMHG

## 2021-01-13 DIAGNOSIS — M25.531 BILATERAL WRIST PAIN: ICD-10-CM

## 2021-01-13 DIAGNOSIS — M25.532 BILATERAL WRIST PAIN: ICD-10-CM

## 2021-01-13 DIAGNOSIS — G56.01 RIGHT CARPAL TUNNEL SYNDROME: ICD-10-CM

## 2021-01-13 DIAGNOSIS — G56.02 LEFT CARPAL TUNNEL SYNDROME: Primary | ICD-10-CM

## 2021-01-13 PROCEDURE — 73110 X-RAY EXAM OF WRIST: CPT | Mod: TC | Performed by: RADIOLOGY

## 2021-01-13 PROCEDURE — 99214 OFFICE O/P EST MOD 30 MIN: CPT | Performed by: ORTHOPAEDIC SURGERY

## 2021-01-13 ASSESSMENT — MIFFLIN-ST. JEOR: SCORE: 2574.16

## 2021-01-13 ASSESSMENT — PAIN SCALES - GENERAL: PAINLEVEL: NO PAIN (0)

## 2021-01-13 NOTE — LETTER
1/13/2021         RE: Frankie Gomez  [address] [facility] [address]        Dear Colleague,    Thank you for referring your patient, Frankie Gomez, to the [facility]. Please see a copy of my visit note below.    ORTHOPEDIC CONSULT      Chief Complaint: Frankie Gomez is a 40 year old male who is being seen for   Chief Complaint   Patient presents with     Musculoskeletal Problem     bilateral wrist pain       History of Present Illness:   Has been seen by Dr. Rea in the past for other issues, first time with myself and first for wrists. States has a 20+ year history of bilateral hand numbness/tingling.  Thumb, index, middle, and radial side of ring. Denies any little finger involvement.  R > L. States constantly tingling and has hours of numbness at a time.  Worst early AM. Wears braces at night which does help.  Starting to get pain with it as well.  Getting progressive worse. Notes dropping objects and loosing  strength. Denies any neck to hand symptoms. Though does note occasionally will have some numbness from shoulder down at night with certain positions. None during the day and different from hand.  States long hx of multiple low back problems but denies cervical issues.  Denies neuropathy. Denies diabetes.  Treatments tried: rest, time, activity modification, bracing, 20+ years of watchful waiting.   Has noticed an occasional locking to middle finger of right hand. Not any today.       Patient's past medical, surgical, social and family histories reviewed.     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     Unspecified sinusitis (chronic)        Past Surgical History:   Procedure Laterality Date     APPENDECTOMY  07/25/09     COLONOSCOPY N/A 12/30/2016    Procedure: COMBINED COLONOSCOPY, SINGLE OR MULTIPLE BIOPSY/POLYPECTOMY BY BIOPSY;  Surgeon: Rikki Jenkins MD;  Location: [facility]     ESOPHAGOSCOPY, GASTROSCOPY, DUODENOSCOPY (EGD), COMBINED N/A 12/30/2016    Procedure: COMBINED ESOPHAGOSCOPY, GASTROSCOPY, DUODENOSCOPY (EGD), BIOPSY SINGLE OR MULTIPLE;  Surgeon: Rikki Jenkins MD;  Location: [facility]     EXCISE MASS FOOT Right 2/13/2015    Procedure: EXCISE MASS FOOT;  Surgeon: Ramo Decker DPM;  Location: [facility]     HC COLONOSCOPY W SNARE REMOVAL TUMOR/POLYP/LESION  1998     LAPAROSCOPIC CHOLECYSTECTOMY N/A 2/13/2017    Procedure: LAPAROSCOPIC CHOLECYSTECTOMY;  Surgeon: Toby Bishop MD;  Location: [facility]       Medications:       Acetaminophen (TYLENOL PO), Take 1,000 mg by mouth daily Reported on 2/13/2017       atorvastatin (LIPITOR) 10 MG tablet, Take 1 tablet (10 mg) by mouth daily       ibuprofen (ADVIL/MOTRIN) 800 MG tablet, Take 1 tablet (800 mg) by mouth every 8 hours as needed Reported on 2/13/2017       losartan-hydrochlorothiazide (HYZAAR) 50-12.5 MG tablet, Take 1 tablet by mouth daily       omeprazole (PRILOSEC) 40 MG capsule, Take 40 mg by mouth as needed Reported on 2/13/2017       PROAIR  (90 BASE) MCG/ACT inhaler,        tamsulosin (FLOMAX) 0.4 MG capsule, Take 1 capsule (0.4 mg) by mouth daily (Patient not taking: Reported on 1/13/2021)       [DISCONTINUED] hydrochlorothiazide 12.5 MG TABS, Take 1 tablet (12.5 mg) by mouth daily         triamcinolone (KENALOG-40) injection 80 mg        Allergies   Allergen Reactions     No Known Allergies        Social History     Occupational History     Occupation: [employer]     Employer: [employer]   Tobacco Use     Smoking status: Former Smoker     Packs/day: 0.75      "Years: 13.00     Pack years: 9.75     Types: Cigarettes     Quit date: 4/15/2013     Years since quittin.7     Smokeless tobacco: Never Used     Tobacco comment: using electric cigarette   Substance and Sexual Activity     Alcohol use: No     Drug use: Yes     Types: Marijuana     Comment: marijuana occasional use     Sexual activity: Yes     Partners: Female     Birth control/protection: None       Family History   Problem Relation Age of Onset     Connective Tissue Disorder Mother         sjogrens     Depression Mother         on meds     Neurologic Disorder Mother         restless leg, migraines     Thyroid Disease Mother      C.A.D. Mother      Obesity Mother         Az-en-Y, snores     Obesity Father         snores     Cancer Paternal Grandmother      Obesity Paternal Grandfather        REVIEW OF SYSTEMS  10 point review systems performed otherwise negative as noted as per history of present illness.    Physical Exam:  Vitals: /80   Ht 1.803 m (5' 11\")   Wt (!) 163.7 kg (360 lb 14.4 oz)   BMI 50.34 kg/m    BMI= Body mass index is 50.34 kg/m .  Constitutional: healthy, alert and no acute distress   Psychiatric: mentation appears normal and affect normal/bright  NEURO: no focal deficits  RESP: Normal with easy respirations and no use of accessory muscles to breathe, no audible wheezing or retractions  CV: bilateral upper extemity:  no edema         Regular rate and rhythm by palpation  SKIN: No erythema, rashes, excoriation, or breakdown. No evidence of infection.   JOINT/EXTREMITIES:bilateral wrist: no effusion. No deformity. No thenar wasting. No atrophy. Equal  strength right and left. +median nerve compression test bilaterally - recreates symptoms. - tinel's at wrist and elbow. - phalen's at wrist. Radial pulse 2+. Decreased sensation along thumb, index, middle and radial half of ring.  Full wrist motion, full elbow motion. No areas of focal tenderness. No bruising. Fingers well perfused. " "No triggering or locking with fingers.    Lymph: no appreciated lymphedema  GAIT: not tested     Diagnostic Modalities:  bilateral wrist X-ray: No fracture, dislocation and or lesions. Normal alignment.  Independent visualization of the images was performed.      Impression: bilateral carpal tunnel syndrome R > L.     Plan:  All of the above pertinent physical exam and imaging modalities findings was reviewed with Frankie. Hx, exam and consistent with carpal tunnel syndrome.  He has had many years of progressive symptoms to the point of now dropping objects, loosing  strength, constant tingling and times of numbness for \"hours at a time\" despite conservative therapies.  With length of symptoms, severity of symptoms do not anticipate therapy or injections would resolve symptoms though discuss both surgical and non-surgical symptoms.  He would like to proceed with surgery.  Does understand limitations to surgery with regards of preventing further damage but not all symptoms may be reversible.   Patient is dealing with moving soon.  Will call when ready for surgery and will place order then. Patient more symptomatic on right, would start with right then proceed to left in a staged manner.      The patient has attempted conservative treatments that include bracing, rest, activity modification, time yet still continues to have significant issues. These issues include progressive numbness, dropping objects, loss of strength. Secondary to these reasons I have offered surgery in the form of right carpal tunnel release under local anesthesia.    The risks, benefits, alternatives, complication, limitations and expectations were reviewed at length. Complications such as infection, bleeding, nerve damage, incomplete release, pillar pain, repeat surgery, skin problems, scar sensitivity,  were discussed. Also reviewed were expectations and the goal of surgery. With surgery the goal would be to prevent further damage to the " nerve, the surgery may not be able to reverse any current nerve damage. Postoperatively there will be limitation in use for approximately 3-4 weeks or as clinically indicated. All questions were answered. The patient agrees to proceed with surgery.      Anticipate performing the surgery under local anesthetic. No H&P needed.       Return to clinic 10 days post-operatively. , or sooner as needed for changes.  Re-x-ray on return: No    Scribed by:  Remi Dumont APRN, CNP  5:05 PM  1/13/2021  The information in this document, created by a scribe for me, accurately reflects the services I personally performed and the decisions made by me. I have reviewed and approved this document for accuracy    Natanael Joyner, DO           Again, thank you for allowing me to participate in the care of your patient.        Sincerely,        Natanael Joyner DO

## 2021-01-13 NOTE — PROGRESS NOTES
ORTHOPEDIC CONSULT      Chief Complaint: Frankie Gomez is a 40 year old male who is being seen for   Chief Complaint   Patient presents with     Musculoskeletal Problem     bilateral wrist pain       History of Present Illness:   Has been seen by Dr. Rea in the past for other issues, first time with myself and first for wrists. States has a 20+ year history of bilateral hand numbness/tingling.  Thumb, index, middle, and radial side of ring. Denies any little finger involvement.  R > L. States constantly tingling and has hours of numbness at a time.  Worst early AM. Wears braces at night which does help.  Starting to get pain with it as well.  Getting progressive worse. Notes dropping objects and loosing  strength. Denies any neck to hand symptoms. Though does note occasionally will have some numbness from shoulder down at night with certain positions. None during the day and different from hand.  States long hx of multiple low back problems but denies cervical issues.  Denies neuropathy. Denies diabetes.  Treatments tried: rest, time, activity modification, bracing, 20+ years of watchful waiting.   Has noticed an occasional locking to middle finger of right hand. Not any today.       Patient's past medical, surgical, social and family histories reviewed.     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     Unspecified sinusitis (chronic)        Past Surgical History:   Procedure Laterality Date     APPENDECTOMY  07/25/09     COLONOSCOPY N/A 12/30/2016    Procedure: COMBINED  COLONOSCOPY, SINGLE OR MULTIPLE BIOPSY/POLYPECTOMY BY BIOPSY;  Surgeon: Rikki Jenkins MD;  [facility]     ESOPHAGOSCOPY, GASTROSCOPY, DUODENOSCOPY (EGD), COMBINED N/A 2016    Procedure: COMBINED ESOPHAGOSCOPY, GASTROSCOPY, DUODENOSCOPY (EGD), BIOPSY SINGLE OR MULTIPLE;  Surgeon: Rikki Jenkins MD;  [facility]     EXCISE MASS FOOT Right 2015    Procedure: EXCISE MASS FOOT;  Surgeon: Ramo Decker DPM;  [facility]: [facility]     HC COLONOSCOPY W SNARE REMOVAL TUMOR/POLYP/LESION       LAPAROSCOPIC CHOLECYSTECTOMY N/A 2017    Procedure: LAPAROSCOPIC CHOLECYSTECTOMY;  Surgeon: Toby Bishop MD;  [facility]       Medications:       Acetaminophen (TYLENOL PO), Take 1,000 mg by mouth daily Reported on 2017       atorvastatin (LIPITOR) 10 MG tablet, Take 1 tablet (10 mg) by mouth daily       ibuprofen (ADVIL/MOTRIN) 800 MG tablet, Take 1 tablet (800 mg) by mouth every 8 hours as needed Reported on 2017       losartan-hydrochlorothiazide (HYZAAR) 50-12.5 MG tablet, Take 1 tablet by mouth daily       omeprazole (PRILOSEC) 40 MG capsule, Take 40 mg by mouth as needed Reported on 2017       PROAIR  (90 BASE) MCG/ACT inhaler,        tamsulosin (FLOMAX) 0.4 MG capsule, Take 1 capsule (0.4 mg) by mouth daily (Patient not taking: Reported on 2021)       [DISCONTINUED] hydrochlorothiazide 12.5 MG TABS, Take 1 tablet (12.5 mg) by mouth daily         triamcinolone (KENALOG-40) injection 80 mg        Allergies   Allergen Reactions     No Known Allergies        Social History     Occupational History     Occupation: IT     Employer: [employer]   Tobacco Use     Smoking status: Former Smoker     Packs/day: 0.75     Years: 13.00     Pack years: 9.75     Types: Cigarettes     Quit date: 4/15/2013     Years since quittin.7     Smokeless tobacco: Never Used     Tobacco comment: using electric cigarette   Substance and Sexual Activity     Alcohol use: No      "Drug use: Yes     Types: Marijuana     Comment: marijuana occasional use     Sexual activity: Yes     Partners: Female     Birth control/protection: None       Family History   Problem Relation Age of Onset     Connective Tissue Disorder Mother         sjogrens     Depression Mother         on meds     Neurologic Disorder Mother         restless leg, migraines     Thyroid Disease Mother      C.A.D. Mother      Obesity Mother         Az-en-Y, snores     Obesity Father         snores     Cancer Paternal Grandmother      Obesity Paternal Grandfather        REVIEW OF SYSTEMS  10 point review systems performed otherwise negative as noted as per history of present illness.    Physical Exam:  Vitals: /80   Ht 1.803 m (5' 11\")   Wt (!) 163.7 kg (360 lb 14.4 oz)   BMI 50.34 kg/m    BMI= Body mass index is 50.34 kg/m .  Constitutional: healthy, alert and no acute distress   Psychiatric: mentation appears normal and affect normal/bright  NEURO: no focal deficits  RESP: Normal with easy respirations and no use of accessory muscles to breathe, no audible wheezing or retractions  CV: bilateral upper extemity:  no edema         Regular rate and rhythm by palpation  SKIN: No erythema, rashes, excoriation, or breakdown. No evidence of infection.   JOINT/EXTREMITIES:bilateral wrist: no effusion. No deformity. No thenar wasting. No atrophy. Equal  strength right and left. +median nerve compression test bilaterally - recreates symptoms. - tinel's at wrist and elbow. - phalen's at wrist. Radial pulse 2+. Decreased sensation along thumb, index, middle and radial half of ring.  Full wrist motion, full elbow motion. No areas of focal tenderness. No bruising. Fingers well perfused. No triggering or locking with fingers.    Lymph: no appreciated lymphedema  GAIT: not tested     Diagnostic Modalities:  bilateral wrist X-ray: No fracture, dislocation and or lesions. Normal alignment.  Independent visualization of the images was " "performed.      Impression: bilateral carpal tunnel syndrome R > L.     Plan:  All of the above pertinent physical exam and imaging modalities findings was reviewed with Frankie. Hx, exam and consistent with carpal tunnel syndrome.  He has had many years of progressive symptoms to the point of now dropping objects, loosing  strength, constant tingling and times of numbness for \"hours at a time\" despite conservative therapies.  With length of symptoms, severity of symptoms do not anticipate therapy or injections would resolve symptoms though discuss both surgical and non-surgical symptoms.  He would like to proceed with surgery.  Does understand limitations to surgery with regards of preventing further damage but not all symptoms may be reversible.   Patient is dealing with moving soon.  Will call when ready for surgery and will place order then. Patient more symptomatic on right, would start with right then proceed to left in a staged manner.      The patient has attempted conservative treatments that include bracing, rest, activity modification, time yet still continues to have significant issues. These issues include progressive numbness, dropping objects, loss of strength. Secondary to these reasons I have offered surgery in the form of right carpal tunnel release under local anesthesia.    The risks, benefits, alternatives, complication, limitations and expectations were reviewed at length. Complications such as infection, bleeding, nerve damage, incomplete release, pillar pain, repeat surgery, skin problems, scar sensitivity,  were discussed. Also reviewed were expectations and the goal of surgery. With surgery the goal would be to prevent further damage to the nerve, the surgery may not be able to reverse any current nerve damage. Postoperatively there will be limitation in use for approximately 3-4 weeks or as clinically indicated. All questions were answered. The patient agrees to proceed with surgery. "      Anticipate performing the surgery under local anesthetic. No H&P needed.       Return to clinic 10 days post-operatively. , or sooner as needed for changes.  Re-x-ray on return: No    Scribed by:  TASHA Dockery, CNP  5:05 PM  1/13/2021  The information in this document, created by a scribe for me, accurately reflects the services I personally performed and the decisions made by me. I have reviewed and approved this document for accuracy    Natanael Joyner,

## 2021-01-14 ENCOUNTER — TRANSFERRED RECORDS (OUTPATIENT)
Dept: HEALTH INFORMATION MANAGEMENT | Facility: CLINIC | Age: 41
End: 2021-01-14

## 2021-04-15 ENCOUNTER — IMMUNIZATION (OUTPATIENT)
Dept: FAMILY MEDICINE | Facility: CLINIC | Age: 41
End: 2021-04-15
Payer: COMMERCIAL

## 2021-04-15 PROCEDURE — 91301 PR COVID VAC MODERNA 100 MCG/0.5 ML IM: CPT

## 2021-04-15 PROCEDURE — 0011A PR COVID VAC MODERNA 100 MCG/0.5 ML IM: CPT

## 2021-04-28 ENCOUNTER — NURSE TRIAGE (OUTPATIENT)
Dept: NURSING | Facility: CLINIC | Age: 41
End: 2021-04-28

## 2021-04-28 ENCOUNTER — HOSPITAL ENCOUNTER (EMERGENCY)
Facility: CLINIC | Age: 41
Discharge: HOME | End: 2021-04-28
Attending: PHYSICIAN ASSISTANT | Admitting: PHYSICIAN ASSISTANT
Payer: COMMERCIAL

## 2021-04-28 VITALS
BODY MASS INDEX: 51.72 KG/M2 | SYSTOLIC BLOOD PRESSURE: 148 MMHG | DIASTOLIC BLOOD PRESSURE: 110 MMHG | TEMPERATURE: 98.7 F | RESPIRATION RATE: 18 BRPM | OXYGEN SATURATION: 99 % | WEIGHT: 315 LBS | HEART RATE: 87 BPM

## 2021-04-28 DIAGNOSIS — K62.5 RECTAL BLEEDING: ICD-10-CM

## 2021-04-28 PROCEDURE — 99282 EMERGENCY DEPT VISIT SF MDM: CPT | Performed by: PHYSICIAN ASSISTANT

## 2021-04-28 PROCEDURE — 99284 EMERGENCY DEPT VISIT MOD MDM: CPT | Performed by: PHYSICIAN ASSISTANT

## 2021-04-28 NOTE — ED TRIAGE NOTES
He has had rectal bleeding all day that he is having trouble stopping.  He has hemorrhoids and is worried there is arterial blood because it is shooting out.

## 2021-04-28 NOTE — TELEPHONE ENCOUNTER
Frankie is calling and states that he was going to the bathroom and earlier today as well and when wiping noticed blood on toilet paper.  Last time same thing happened.  Frankie states that bleeding was constant and had to jump in the shower.  Frankie states that he has a history of hemorrhoids but has not this bad for bleeding.    COVID 19 Nurse Triage Plan/Patient Instructions    Please be aware that novel coronavirus (COVID-19) may be circulating in the community. If you develop symptoms such as fever, cough, or SOB or if you have concerns about the presence of another infection including coronavirus (COVID-19), please contact your health care provider or visit [email or web address].     Disposition/Instructions    ED Visit recommended. Follow protocol based instructions.     Bring Your Own Device:  Please also bring your smart device(s) (smart phones, tablets, laptops) and their charging cables for your personal use and to communicate with your care team during your visit.    Thank you for taking steps to prevent the spread of this virus.  o Limit your contact with others.  o Wear a simple mask to cover your cough.  o Wash your hands well and often.    Resources    [facility]: About COVID-19: [email or web address]    CDC: What to Do If You're Sick: www.cdc.gov/coronavirus/2019-ncov/about/steps-when-sick.html    CDC: Ending Home Isolation: www.cdc.gov/coronavirus/2019-ncov/hcp/disposition-in-home-patients.html     CDC: Caring for Someone: www.cdc.gov/coronavirus/2019-ncov/if-you-are-sick/care-for-someone.html     [facility]: Interim Guidance for Hospital Discharge to Home: www.health.[email or web address].mn.[email or web address]/diseases/[email or web address]/hcp/hospdischarge.pdf    [facility] clinical trials (COVID-19 research studies): [email or web address]-trials     Below are the COVID-19 hotlines at the [facility] of [facility] ([facility]). Interpreters are available.   o For health questions: Call  [phone] or [phone] (7 a.m. to 7 p.m.)  o For questions about schools and childcare: Call 886-638-0336 or 1-149.564.6363 (7 a.m. to 7 p.m.)                       Reason for Disposition    SEVERE rectal bleeding (large blood clots; on and off, or constant bleeding)    Additional Information    Negative: Passed out (i.e., fainted, collapsed and was not responding)    Negative: Shock suspected (e.g., cold/pale/clammy skin, too weak to stand, low BP, rapid pulse)    Negative: Vomiting red blood or black (coffee ground) material    Negative: Sounds like a life-threatening emergency to the triager    Protocols used: RECTAL BLEEDING-A-[place]

## 2021-04-28 NOTE — DISCHARGE INSTRUCTIONS
It was a pleasure working with you today!  I hope your condition improves rapidly!     Thankfully, your exam was very reassuring.  You did have 3 external hemorrhoids, but they did not have any active inflammation or sign of active bleeding.  I did palpate an internal hemorrhoid, and I think this likely is the cause for the bleeding that you experienced earlier.

## 2021-04-29 NOTE — ED PROVIDER NOTES
History     Chief Complaint   Patient presents with     Rectal Bleeding     HPI  Frankie Gomez is a 40 year old male who presents for evaluation of rectal bleeding that occurred just prior to arrival.  He noticed blood when wiping.  Denies having a painful bowel movement.  Has known external hemorrhoids.  When he stood up after going to the bathroom, he noted blood dripped out onto the toilet seat and almost shot out of his buttocks.  He immediately went into the shower given his concerned that he was having irritation of his known external hemorrhoids.  Reports that he has not had any bleeding into his underwear ever since this.  Patient stated that his bowel movement was soft and was not large caliber.  He does have 2-3 bowel movements per day, and states that he can get quite irritated in the rectum area due to this.  He denies any abdominal pain, hematochezia, melena, rectal discomfort, fever, chills, dysuria, frequency, urgency, flank pain, or gross hematuria.  Reports that he has had a recent colonoscopy in the last 3-4 years and it was completely normal.  Denies any recent unexplained weight loss, malaise, or night sweats.        Allergies:  Allergies   Allergen Reactions     No Known Allergies        Problem List:    Patient Active Problem List    Diagnosis Date Noted     Left carpal tunnel syndrome 01/13/2021     Priority: Medium     Right carpal tunnel syndrome 01/13/2021     Priority: Medium     Tension headache 09/19/2019     Priority: Medium     Other infectious mononucleosis with other complication 09/19/2019     Priority: Medium     Seasonal allergic rhinitis due to other allergic trigger 07/30/2019     Priority: Medium     Post-nasal drip 07/30/2019     Priority: Medium     Increased frequency of urination 07/30/2019     Priority: Medium     S/P laparoscopic cholecystectomy 02/13/2017     Priority: Medium     Diverticulosis of large intestine 01/01/2017     Priority: Medium     External  hemorrhoids 01/01/2017     Priority: Medium     SVT (supraventricular tachycardia) (H) 05/27/2015     Priority: Medium     NASIR (obstructive sleep apnea) 12/15/2014     Priority: Medium     [place] 11/20/2014 AHI 5.2 RDI 11.8        Heartburn      Priority: Medium     Morbid obesity (H)      Priority: Medium     HTN, goal below 140/80      Priority: Medium     Hyperlipidemia      Priority: Medium     Diagnosis updated by automated process. Provider to review and confirm.       Asthma      Priority: Medium     [employer] Update utility       Fatty liver disease, nonalcoholic      Priority: Medium     Generalized anxiety disorder      Priority: Medium     Diagnosis updated by automated process. Provider to review and confirm.       Panic      Priority: Medium     Chronic low back pain      Priority: Medium     fall injury, fell through stairs-hit concrete       Lumbosacral neuritis 01/28/2013     Priority: Medium     CARDIOVASCULAR SCREENING; LDL GOAL LESS THAN 130 02/02/2011     Priority: Medium     Major depressive disorder, single episode, moderate (H) 02/02/2011     Priority: Medium     Displacement of intervertebral disc, site unspecified, without myelopathy 02/12/2007     Priority: Medium     Tobacco use disorder 11/21/2006     Priority: Medium     Pain in joint, forearm 06/25/2006     Priority: Medium     Degeneration of lumbar or lumbosacral intervertebral disc 08/10/2005     Priority: Medium        Past Medical History:    Past Medical History:   Diagnosis Date     Chronic low back pain      Depression      Diverticulitis      Fatty liver disease, nonalcoholic      Former smoker      MARISOL (generalised anxiety disorder)      Hyperlipaemia      Hypertension      Morbid obesity (H)      OA (osteoarthritis)      NASIR (obstructive sleep apnea) 11/20/2014     Panic      SVT (supraventricular tachycardia) (H)      Unspecified sinusitis (chronic)        Past Surgical History:    Past Surgical History:   Procedure Laterality  Date     APPENDECTOMY  09     COLONOSCOPY N/A 2016    Procedure: COMBINED COLONOSCOPY, SINGLE OR MULTIPLE BIOPSY/POLYPECTOMY BY BIOPSY;  Surgeon: Rikki Jenkins MD;  [facility]     ESOPHAGOSCOPY, GASTROSCOPY, DUODENOSCOPY (EGD), COMBINED N/A 2016    Procedure: COMBINED ESOPHAGOSCOPY, GASTROSCOPY, DUODENOSCOPY (EGD), BIOPSY SINGLE OR MULTIPLE;  Surgeon: Rikki Jenkins MD;  [facility]     EXCISE MASS FOOT Right 2015    Procedure: EXCISE MASS FOOT;  Surgeon: Ramo Decker DPM;  [facility]     HC COLONOSCOPY W SNARE REMOVAL TUMOR/POLYP/LESION       LAPAROSCOPIC CHOLECYSTECTOMY N/A 2017    Procedure: LAPAROSCOPIC CHOLECYSTECTOMY;  Surgeon: Toby Bishop MD;  [facility]       Family History:    Family History   Problem Relation Age of Onset     Connective Tissue Disorder Mother         sjogrens     Depression Mother         on meds     Neurologic Disorder Mother         restless leg, migraines     Thyroid Disease Mother      C.A.D. Mother      Obesity Mother         Az-en-Y, snores     Obesity Father         snores     Cancer Paternal Grandmother      Obesity Paternal Grandfather        Social History:  Marital Status:  Single [1]  Social History     Tobacco Use     Smoking status: Former Smoker     Packs/day: 0.75     Years: 13.00     Pack years: 9.75     Types: Cigarettes     Quit date: 4/15/2013     Years since quittin.0     Smokeless tobacco: Never Used   Substance Use Topics     Alcohol use: No     Drug use: Yes     Types: Marijuana     Comment: marijuana occasional use        Medications:    Acetaminophen (TYLENOL PO)  atorvastatin (LIPITOR) 10 MG tablet  ibuprofen (ADVIL/MOTRIN) 800 MG tablet  losartan-hydrochlorothiazide (HYZAAR) 50-12.5 MG tablet  omeprazole (PRILOSEC) 40 MG capsule  PROAIR  (90 BASE) MCG/ACT inhaler  tamsulosin (FLOMAX) 0.4 MG capsule          Review of Systems   All other systems reviewed and are  negative.      Physical Exam   BP: (!) 156/101  Pulse: 87  Temp: 98.7  F (37.1  C)  Resp: 18  Weight: (!) 168.2 kg (370 lb 12.8 oz)  SpO2: 98 %      Physical Exam  Vitals signs and nursing note reviewed.   Constitutional:       General: He is not in acute distress.     Appearance: He is not diaphoretic.   HENT:      Head: Normocephalic and atraumatic.      Right Ear: External ear normal.      Left Ear: External ear normal.      Nose: Nose normal.      Mouth/Throat:      Pharynx: No oropharyngeal exudate.   Eyes:      General: No scleral icterus.        Right eye: No discharge.         Left eye: No discharge.      Conjunctiva/sclera: Conjunctivae normal.      Pupils: Pupils are equal, round, and reactive to light.   Neck:      Musculoskeletal: Normal range of motion and neck supple.      Thyroid: No thyromegaly.   Cardiovascular:      Rate and Rhythm: Normal rate and regular rhythm.      Heart sounds: Normal heart sounds. No murmur.   Pulmonary:      Effort: Pulmonary effort is normal. No respiratory distress.      Breath sounds: Normal breath sounds. No wheezing or rales.   Chest:      Chest wall: No tenderness.   Abdominal:      General: Bowel sounds are normal. There is no distension.      Palpations: Abdomen is soft. There is no mass.      Tenderness: There is no abdominal tenderness. There is no guarding or rebound.   Genitourinary:     Prostate: Normal. Not enlarged and no nodules present.      Rectum: External hemorrhoid and internal hemorrhoid present. No mass, tenderness or anal fissure.      Comments: No active inflammation of the external hemorrhoids.  3 total.  I am concerned that I did palpate an internal hemorrhoid on NO.  No blood per examining finger.  Musculoskeletal: Normal range of motion.         General: No tenderness or deformity.   Lymphadenopathy:      Cervical: No cervical adenopathy.   Skin:     General: Skin is warm and dry.      Capillary Refill: Capillary refill takes less than 2  seconds.      Findings: No erythema or rash.   Neurological:      Mental Status: He is alert and oriented to person, place, and time.      Cranial Nerves: No cranial nerve deficit.   Psychiatric:         Behavior: Behavior normal.         Thought Content: Thought content normal.         ED Course        Procedures               Critical Care time:  none               No results found for this or any previous visit (from the past 24 hour(s)).    Medications - No data to display    Assessments & Plan (with Medical Decision Making)  Rectal bleeding     40 year old male presents for evaluation of one episode of rectal bleeding with wiping after bowel movement prior to arrival.  He did hopped in the shower afterwards and has not had any bleeding into his underwear since.  Denies any other symptoms.  Please see HPI for details.  Had a recent colonoscopy within the past 3-4 years per his report and it was completely normal.  On exam patient has 3 external hemorrhoids without active inflammation.  No anal fissure.  NO with palpated internal hemorrhoid but no blood per examining finger.  No other abnormalities identified.  Patient was reassured.  This bleeding likely came from the internal hemorrhoid.  Discussed ways to make sure that his stool is soft and smaller caliber by increasing fiber and fluids.  Could try stool softener.  If having any constant irritation, I recommended OTC hemorrhoid suppositories to provide lubrication to the internal rectal area.  If this continues to happen, I recommend the patient get a colonoscopy.  He was in agreement with this plan and was suitable for discharge.     I have reviewed the nursing notes.    I have reviewed the findings, diagnosis, plan and need for follow up with the patient.       Discharge Medication List as of 4/28/2021  4:03 PM          Final diagnoses:   Rectal bleeding     Disclaimer: This note consists of symbols derived from keyboarding, dictation and/or voice  recognition software. As a result, there may be errors in the script that have gone undetected. Please consider this when interpreting information found in this chart.        4/28/2021   [facility] EMERGENCY DEPT     Alex Perez PA-C  04/29/21 0032

## 2021-05-04 ENCOUNTER — TELEPHONE (OUTPATIENT)
Dept: FAMILY MEDICINE | Facility: OTHER | Age: 41
End: 2021-05-04

## 2021-05-04 NOTE — TELEPHONE ENCOUNTER
Patient Quality Outreach Summary      Summary:    Patient is due/failing the following:   ACT needed, Hypertension follow up and medication follow up     Type of outreach:    Phone, left message for patient/parent to call back.    Questions for provider review:    None                                                                                                                    Marita Sung CMA       Chart routed to Care Team.

## 2021-05-05 NOTE — TELEPHONE ENCOUNTER
Next 5 appointments (look out 90 days)    May 10, 2021  5:20 PM  Office Visit with Alvin Barrett PA-C  [facility] [address] [place] [id number]  [phone]        Marita Sung CMA

## 2021-05-07 NOTE — PROGRESS NOTES
"    Assessment & Plan     Major depressive disorder, single episode, moderate (H)  Doing well no new concerns.  Denies any need for medication.    SVT (supraventricular tachycardia) (H)  Historically noted no new concerns follow-up as needed.    Morbid obesity (H)  Still problematic with a body mass index of 52.02.  Strongly recommended changes in diet and exercise.    HTN, goal below 140/80  Refilled medications as requested hopefully get him in 3 months at a time rather than monthly.  Hopefully they can be mailed to his home.  - losartan-hydrochlorothiazide (HYZAAR) 50-12.5 MG tablet; Take 1 tablet by mouth daily  - tamsulosin (FLOMAX) 0.4 MG capsule; Take 1 capsule (0.4 mg) by mouth daily    Diverticulosis of large intestine without hemorrhage  Historically noted no new concerns no new bleeding.  Thought is that there may be internal hemorrhoids as well.  Advised that if he should have any further bleeding he will need another colonoscopy.      Hyperlipidemia, unspecified hyperlipidemia type  - atorvastatin (LIPITOR) 10 MG tablet; Take 1 tablet (10 mg) by mouth daily    Mild intermittent asthma without complication  - PROAIR  (90 Base) MCG/ACT inhaler; Inhale 1-2 puffs into the lungs every 4 hours as needed for shortness of breath / dyspnea or wheezing     BMI:   Estimated body mass index is 52.02 kg/m  as calculated from the following:    Height as of 1/13/21: 1.803 m (5' 11\").    Weight as of this encounter: 169.2 kg (373 lb).   Weight management plan: Discussed healthy diet and exercise guidelines    Work on weight loss  Regular exercise  Return in about 6 months (around 11/10/2021) for BP Recheck with provider, Diabetic med check and labs, Medication Recheck, Lab Work.    JOHN PAUL [facility]    Darren David is a 40 year old who presents for the following health issues   History of Present Illness       He eats 0-1 servings of fruits and vegetables daily.He " consumes 2 sweetened beverage(s) daily.He exercises with enough effort to increase his heart rate 10 to 19 minutes per day.  He exercises with enough effort to increase his heart rate 4 days per week. He is missing 7 dose(s) of medications per week.  He is not taking prescribed medications regularly due to other.       Patient coming in for blood pressure check and medication check.    Review of Systems   Constitutional, HEENT, cardiovascular, pulmonary, GI, , musculoskeletal, neuro, skin, endocrine and psych systems are negative, except as otherwise noted.      Objective    There were no vitals taken for this visit.  There is no height or weight on file to calculate BMI.  Physical Exam   GENERAL: healthy, alert and no distress  NECK: no adenopathy, no asymmetry, masses, or scars and thyroid normal to palpation  RESP: lungs clear to auscultation - no rales, rhonchi or wheezes  CV: regular rate and rhythm, normal S1 S2, no S3 or S4, no murmur, click or rub, no peripheral edema and peripheral pulses strong  ABDOMEN: soft, nontender, no hepatosplenomegaly, no masses and bowel sounds normal  MS: no gross musculoskeletal defects noted, no edema  SKIN: no suspicious lesions or rashes to exposed visible skin today.  NEURO: Normal strength and tone, mentation intact and speech normal  PSYCH: mentation appears normal, affect normal/bright    No results found for this or any previous visit (from the past 24 hour(s)).

## 2021-05-10 ENCOUNTER — OFFICE VISIT (OUTPATIENT)
Dept: FAMILY MEDICINE | Facility: OTHER | Age: 41
End: 2021-05-10
Payer: COMMERCIAL

## 2021-05-10 VITALS
WEIGHT: 315 LBS | BODY MASS INDEX: 52.02 KG/M2 | HEART RATE: 72 BPM | TEMPERATURE: 98.2 F | DIASTOLIC BLOOD PRESSURE: 84 MMHG | SYSTOLIC BLOOD PRESSURE: 136 MMHG | OXYGEN SATURATION: 98 % | RESPIRATION RATE: 15 BRPM

## 2021-05-10 DIAGNOSIS — J45.20 MILD INTERMITTENT ASTHMA WITHOUT COMPLICATION: ICD-10-CM

## 2021-05-10 DIAGNOSIS — K57.30 DIVERTICULOSIS OF LARGE INTESTINE WITHOUT HEMORRHAGE: ICD-10-CM

## 2021-05-10 DIAGNOSIS — E66.01 MORBID OBESITY (H): ICD-10-CM

## 2021-05-10 DIAGNOSIS — I10 HTN, GOAL BELOW 140/80: Primary | ICD-10-CM

## 2021-05-10 DIAGNOSIS — I47.10 SVT (SUPRAVENTRICULAR TACHYCARDIA) (H): ICD-10-CM

## 2021-05-10 DIAGNOSIS — E78.5 HYPERLIPIDEMIA, UNSPECIFIED HYPERLIPIDEMIA TYPE: ICD-10-CM

## 2021-05-10 DIAGNOSIS — F32.1 MAJOR DEPRESSIVE DISORDER, SINGLE EPISODE, MODERATE (H): ICD-10-CM

## 2021-05-10 PROCEDURE — 99214 OFFICE O/P EST MOD 30 MIN: CPT | Performed by: PHYSICIAN ASSISTANT

## 2021-05-10 RX ORDER — ATORVASTATIN CALCIUM 10 MG/1
10 TABLET, FILM COATED ORAL DAILY
Qty: 90 TABLET | Refills: 1 | Status: SHIPPED | OUTPATIENT
Start: 2021-05-10 | End: 2022-02-02

## 2021-05-10 RX ORDER — LOSARTAN POTASSIUM AND HYDROCHLOROTHIAZIDE 12.5; 5 MG/1; MG/1
1 TABLET ORAL DAILY
Qty: 90 TABLET | Refills: 1 | Status: SHIPPED | OUTPATIENT
Start: 2021-05-10 | End: 2022-02-02

## 2021-05-10 RX ORDER — ALBUTEROL SULFATE 90 UG/1
1-2 AEROSOL, METERED RESPIRATORY (INHALATION) EVERY 4 HOURS PRN
Qty: 18 G | Refills: 3 | Status: SHIPPED | OUTPATIENT
Start: 2021-05-10 | End: 2021-05-20

## 2021-05-10 RX ORDER — LOSARTAN POTASSIUM AND HYDROCHLOROTHIAZIDE 12.5; 5 MG/1; MG/1
1 TABLET ORAL DAILY
Qty: 90 TABLET | Refills: 1 | Status: SHIPPED | OUTPATIENT
Start: 2021-05-10 | End: 2021-05-10

## 2021-05-10 RX ORDER — TAMSULOSIN HYDROCHLORIDE 0.4 MG/1
0.4 CAPSULE ORAL DAILY
Qty: 90 CAPSULE | Refills: 1 | Status: SHIPPED | OUTPATIENT
Start: 2021-05-10 | End: 2022-02-02

## 2021-05-12 ENCOUNTER — TELEPHONE (OUTPATIENT)
Dept: FAMILY MEDICINE | Facility: OTHER | Age: 41
End: 2021-05-12

## 2021-05-12 NOTE — TELEPHONE ENCOUNTER
Medication was sent on 5/10/21. Patient should have refills.   Romi Lamas RN, BSN  [place]  May 12, 2021

## 2021-05-12 NOTE — TELEPHONE ENCOUNTER
Pharmacy request: PROAIR  (90 Base) MCG/ACT inhaler  Generic medication request  May we dispense the generic equivalent of this medication    [facility]

## 2021-05-13 ENCOUNTER — TELEPHONE (OUTPATIENT)
Dept: FAMILY MEDICINE | Facility: OTHER | Age: 41
End: 2021-05-13

## 2021-05-13 ENCOUNTER — IMMUNIZATION (OUTPATIENT)
Dept: FAMILY MEDICINE | Facility: CLINIC | Age: 41
End: 2021-05-13
Attending: FAMILY MEDICINE
Payer: COMMERCIAL

## 2021-05-13 DIAGNOSIS — J45.20 MILD INTERMITTENT ASTHMA WITHOUT COMPLICATION: Primary | ICD-10-CM

## 2021-05-13 PROCEDURE — 91301 PR COVID VAC MODERNA 100 MCG/0.5 ML IM: CPT

## 2021-05-13 PROCEDURE — 0012A PR COVID VAC MODERNA 100 MCG/0.5 ML IM: CPT

## 2021-05-13 ASSESSMENT — ASTHMA QUESTIONNAIRES: ACT_TOTALSCORE: 25

## 2021-05-13 NOTE — TELEPHONE ENCOUNTER
Prior Authorization Retail Medication Request    Medication/Dose: PROAIR  (90 Base) MCG/ACT inhaler  ICD code (if different than what is on RX):    Previously Tried and Failed:    Rationale:      Insurance Name:    Insurance ID:        Pharmacy Information (if different than what is on RX)  Name:    Phone:

## 2021-05-17 NOTE — TELEPHONE ENCOUNTER
Central Prior Authorization Team   Phone: [phone]      PA Initiation    Medication: PROAIR  (90 Base) MCG/ACT inhaler  Insurance Company: [employer] - Phone [phone] Fax [phone]  Pharmacy Filling the Rx: [facility] - [place], [place] - [address] AT PORTAL TO REGISTERED [facility] SITES  Filling Pharmacy Phone: [phone]  Filling Pharmacy Fax:    Start Date: 5/17/2021    Started PA on CMM and a response of Please advise the dispensing pharmacy to contact the Pharmacy Help Line at [phone] for assistance.  Called and spoke with Lela at [facility].  Completed PA via phone

## 2021-05-18 NOTE — TELEPHONE ENCOUNTER
PRIOR AUTHORIZATION DENIED    Medication: PROAIR  (90 Base) MCG/ACT inhaler-DENIED    Denial Date: 5/18/2021    Denial Rational: Patient must have a history of trial & failure to the formulary alternative(s) or have a contraindication or intolerance to the formulary alternatives:  Albuterol sulfate CFC-free aerosol and  levalbuterol tartarte CFC-free aerosol      Appeal Information:     If provider would like to appeal please provide a letter of medical necessity stating why formulary alternatives would not be clinically appropriate for patient and route back to the PA team.

## 2021-05-20 RX ORDER — ALBUTEROL SULFATE 90 UG/1
2 AEROSOL, METERED RESPIRATORY (INHALATION) EVERY 6 HOURS
Qty: 18 G | Refills: 11 | Status: SHIPPED | OUTPATIENT
Start: 2021-05-20 | End: 2023-01-12

## 2021-06-27 ENCOUNTER — HOSPITAL ENCOUNTER (EMERGENCY)
Facility: CLINIC | Age: 41
Discharge: HOME | End: 2021-06-28
Attending: FAMILY MEDICINE | Admitting: FAMILY MEDICINE
Payer: COMMERCIAL

## 2021-06-27 VITALS
RESPIRATION RATE: 20 BRPM | HEIGHT: 71 IN | TEMPERATURE: 98.2 F | HEART RATE: 108 BPM | WEIGHT: 315 LBS | DIASTOLIC BLOOD PRESSURE: 98 MMHG | SYSTOLIC BLOOD PRESSURE: 159 MMHG | OXYGEN SATURATION: 97 % | BODY MASS INDEX: 44.1 KG/M2

## 2021-06-27 DIAGNOSIS — I83.891 BLEEDING FROM VARICOSE VEINS OF RIGHT LOWER EXTREMITY: ICD-10-CM

## 2021-06-27 PROCEDURE — 12001 RPR S/N/AX/GEN/TRNK 2.5CM/<: CPT | Performed by: FAMILY MEDICINE

## 2021-06-27 PROCEDURE — 99282 EMERGENCY DEPT VISIT SF MDM: CPT | Mod: 25 | Performed by: FAMILY MEDICINE

## 2021-06-27 PROCEDURE — 99283 EMERGENCY DEPT VISIT LOW MDM: CPT | Performed by: FAMILY MEDICINE

## 2021-06-27 ASSESSMENT — MIFFLIN-ST. JEOR: SCORE: 2607.5

## 2021-06-28 NOTE — ED TRIAGE NOTES
Pt presents with concerns of right lower leg wound.  Pt states that he has had an area on the lower leg that looks like a scab for about a decade.  This past week the wound opened and started bleeding profusely.  Tonight it happened again and he is concerned.

## 2021-06-28 NOTE — ED NOTES
Provider placed stitches in wound, applied band aid.  Reviewed discharge instructions with pt.  No additional questions or concerns.  No active bleeding.

## 2021-06-28 NOTE — ED PROVIDER NOTES
ED Provider Note     Frankie Gomez  [id number]  June 28, 2021      CC:     Chief Complaint   Patient presents with     Wound Check          History is obtained from patient.    HPI: Frankie Gomez is a 40 year old male presenting with intermittent bleeding from a vein from the right lower leg. Patient reports that 2 nights ago, he accidentally scratched the scab off of this area and the area squirted blood all over the bathtub within a couple of minutes. Patient was able to get the bleeding stopped. It happened again tonight. He had been standing all day. Bleeding was controlled with pressure dressing using paper towel and masking tape.    PMH/Problem List:   Past Medical History:   Diagnosis Date     Chronic low back pain      Depression      Diverticulitis      Fatty liver disease, nonalcoholic      Former smoker      MARISOL (generalised anxiety disorder)      Hyperlipaemia      Hypertension      Morbid obesity (H)      OA (osteoarthritis)      NASIR (obstructive sleep apnea) 11/20/2014     Panic      SVT (supraventricular tachycardia) (H)      Unspecified sinusitis (chronic)        PSH:   Past Surgical History:   Procedure Laterality Date     APPENDECTOMY  07/25/09     COLONOSCOPY N/A 12/30/2016    Procedure: COMBINED COLONOSCOPY, SINGLE OR MULTIPLE BIOPSY/POLYPECTOMY BY BIOPSY;  Surgeon: Rikki Jenkins MD;  Location:  GI     ESOPHAGOSCOPY, GASTROSCOPY, DUODENOSCOPY (EGD), COMBINED N/A 12/30/2016    Procedure: COMBINED ESOPHAGOSCOPY, GASTROSCOPY, DUODENOSCOPY (EGD), BIOPSY SINGLE OR MULTIPLE;  Surgeon: Rikki Jenkins MD;  Location:  GI     EXCISE MASS FOOT Right 2/13/2015    Procedure: EXCISE MASS FOOT;  Surgeon: Ramo Decker DPM;  Location:  OR      COLONOSCOPY W SNARE REMOVAL TUMOR/POLYP/LESION  1998     LAPAROSCOPIC CHOLECYSTECTOMY N/A 2/13/2017    Procedure: LAPAROSCOPIC CHOLECYSTECTOMY;  Surgeon: Toby Bishop MD;   "Location: [facility] OR       MEDS: triamcinolone (KENALOG-40) injection 80 mg    Acetaminophen (TYLENOL PO), Take 1,000 mg by mouth daily Reported on 2/13/2017  albuterol (PROAIR HFA/PROVENTIL HFA/VENTOLIN HFA) 108 (90 Base) MCG/ACT inhaler, Inhale 2 puffs into the lungs every 6 hours  atorvastatin (LIPITOR) 10 MG tablet, Take 1 tablet (10 mg) by mouth daily  ibuprofen (ADVIL/MOTRIN) 800 MG tablet, Take 1 tablet (800 mg) by mouth every 8 hours as needed Reported on 2/13/2017  losartan-hydrochlorothiazide (HYZAAR) 50-12.5 MG tablet, Take 1 tablet by mouth daily  omeprazole (PRILOSEC) 40 MG capsule, Take 40 mg by mouth as needed Reported on 2/13/2017  tamsulosin (FLOMAX) 0.4 MG capsule, Take 1 capsule (0.4 mg) by mouth daily  [DISCONTINUED] hydrochlorothiazide 12.5 MG TABS, Take 1 tablet (12.5 mg) by mouth daily        Allergies: No known allergies    Triage and nursing notes were reviewed.    ROS: All other systems were reviewed and are negative    Physical Exam:  Vitals:    06/27/21 2356 06/27/21 2358   BP:  (!) 159/98   Pulse: 108    Resp: 20    Temp:  98.2  F (36.8  C)   TempSrc:  Oral   SpO2: 97%    Weight:  (!) 167.8 kg (370 lb)   Height:  1.791 m (5' 10.5\")     GENERAL APPEARANCE: Alert, no distress  EXT: Right lower leg was wrapped with painters tape, and a paper towel underneath.  There is no active bleeding  SKIN: Underlying varicose vein with very minimal bleeding  NEURO: mentation and speech normal; no focal deficits    Labs/Imaging Results:  No results found for this or any previous visit (from the past 24 hour(s)).      Procedure Note: The right lower leg in the area of the varicose vein was locally anesthetized with 2 cc of 1% lidocaine with epinephrine.  A figure-of-eight stitch x2 was placed to tie off the vessel.  This was then dressed with antibiotic ointment and bandage.      Impression:  Final diagnoses:   Bleeding from varicose veins of right lower extremity         ED Course & Medical Decision Making " (Plan):  Frankie Gomez is a 40 year old male with a bleeding varicose vein from the lower extremity.  This initially happened a couple of days ago with a fair amount of bleeding.  Patient describes a large amount of blood that he took a photo of that was in his tub.  Tonight it started to rebleed and he wrapped the lower extremity and came into the ED.  Patient has been up and walking most of the day today which may have aggravated his bleed.  Initially started with him scratching a scab off and this led to bleeding with a column of blood discordant.  Patient was seen shortly after arrival.  The bleeding had stopped.  There is very small oozing from an area of the right lower leg.  There is a small spider/varicose vein around this area.  The vessel was tied off with a figure-of-eight stitch.  Aftercare instructions were discussed.  Suture removal advised in 10-14 days.  Patient was advised to wear compression socks to prevent more varicose veins from forming.  Elevate the leg is much as possible for now.    Written after-visit summary and instructions were given at the time of discharge.        Discharge Instructions:  You had bleeding from a varicose vein of the right lower extremity. This was tied off with stitches.  Perform daily wound care and dressing changes.  Have the stitches removed in 10-14 days.  Use compression stockings and elevate the leg as much as possible.  Return to the emergency department if bleeding recurs.        Disclaimer: This note consists of words and symbols derived from keyboarding and dictation using voice recognition software.  As a result, there may be errors that have gone undetected.  Please consider this when interpreting information found in this note.       Nallely Feldman MD  06/28/21 0650

## 2021-06-28 NOTE — DISCHARGE INSTRUCTIONS
You had bleeding from a varicose vein of the right lower extremity. This was tied off with stitches.  Perform daily wound care and dressing changes.  Have the stitches removed in 10-14 days.  Use compression stockings and elevate the leg as much as possible.  Return to the emergency department if bleeding recurs.

## 2021-07-08 ENCOUNTER — NURSE TRIAGE (OUTPATIENT)
Dept: NURSING | Facility: CLINIC | Age: 41
End: 2021-07-08

## 2021-07-08 NOTE — TELEPHONE ENCOUNTER
Caller states that  He came in contact with poison ivy 4  days  Ago and is having usual severe reaction of  Multiple areas of eruption and   several large oozing  Blisters   No infection, no fevers.  Caller states that he gets this almost annually and   When it its this bad needs prednisone; has been using  Fel's Naphtha soap to dry out blisters  but  cannot control it.    Caller states that he  cannot  make an  OV and inquiring if he can get RX over the phone   Triage protocol reviewed; advised in home care  and  probable need for  prednisone   Advised  that he can have a  V V by telephone with   provider this evening.   Caller understands and will comply.  Call transferred to    Tran Medellin RN  FNA            Reason for Disposition    Big blisters or oozing sores    Additional Information    Negative: Doesn't match the SYMPTOMS of poison ivy, oak, or sumac    Negative: Difficulty breathing or severe coughing following exposure to burning weeds    Negative: Patient sounds very sick or weak to the triager    Negative: Fever and bright red area or streak (from open poison ivy sores)    Negative: Increasing redness around poison ivy and larger than 2 inches (5 cm)    Protocols used: POISON IVY - OAK - SUMAC-A-OH

## 2021-10-23 ENCOUNTER — HEALTH MAINTENANCE LETTER (OUTPATIENT)
Age: 41
End: 2021-10-23

## 2021-11-23 ENCOUNTER — TELEPHONE (OUTPATIENT)
Dept: FAMILY MEDICINE | Facility: OTHER | Age: 41
End: 2021-11-23
Payer: COMMERCIAL

## 2021-11-23 NOTE — TELEPHONE ENCOUNTER
PCP out of clinic. I am covering for him while he is out.      I would recommend that given a history of asthma that he monitors his breathing and using his inhalers throughout the day. He could try some coricidin OTC to help with symptoms along with Tylenol, staying hydrated and resting. For his sinuses he could also use some Flonase over the counter to help with the post nasal drip. Would recommend he have this delivered or another individual get him these medications as he should be self-isolating.      Would avoid decongestants due to hypertension history as this can increase blood pressure.

## 2021-11-23 NOTE — TELEPHONE ENCOUNTER
RN Triage    Patient Contact    Attempt # 1    Was call answered?  No.  Left message on voicemail with information to call me back.    MEI Zamorano/[place] [facility]

## 2021-11-23 NOTE — TELEPHONE ENCOUNTER
Patient states he tested positive for covid.    He has a cough, sinus drainage, body aches, and tired.    Patient wants to know what over counter medication.    Patient given home care advice.    Aurora Chang RN on 11/23/2021 at 10:44 AM

## 2021-11-23 NOTE — TELEPHONE ENCOUNTER
Patient informed of providers notes from other encounter.    No further questions at this time.  Curtis Flores, СВЕТЛАНАN, RN, PHN  [facility] ~ Registered Nurse  Clinic Triage ~ [place] [facility]  November 23, 2021

## 2021-11-29 ENCOUNTER — MYC MEDICAL ADVICE (OUTPATIENT)
Dept: FAMILY MEDICINE | Facility: OTHER | Age: 41
End: 2021-11-29
Payer: COMMERCIAL

## 2022-01-31 DIAGNOSIS — E78.5 HYPERLIPIDEMIA, UNSPECIFIED HYPERLIPIDEMIA TYPE: ICD-10-CM

## 2022-01-31 DIAGNOSIS — I10 HTN, GOAL BELOW 140/80: ICD-10-CM

## 2022-02-01 RX ORDER — LOSARTAN POTASSIUM AND HYDROCHLOROTHIAZIDE 12.5; 5 MG/1; MG/1
TABLET ORAL
Qty: 90 TABLET | Refills: 1 | OUTPATIENT
Start: 2022-02-01

## 2022-02-01 RX ORDER — ATORVASTATIN CALCIUM 10 MG/1
TABLET, FILM COATED ORAL
Qty: 90 TABLET | Refills: 1 | OUTPATIENT
Start: 2022-02-01

## 2022-02-01 RX ORDER — TAMSULOSIN HYDROCHLORIDE 0.4 MG/1
CAPSULE ORAL
Qty: 90 CAPSULE | Refills: 1 | OUTPATIENT
Start: 2022-02-01

## 2022-02-01 NOTE — TELEPHONE ENCOUNTER
Patient is scheduled for 03-03-22. That is the next available that works for him.  He is out of meds and wondering if he still can get a refill since he is scheduled?  He also said its better with his insurance to get the 90 day supply.  Please advise.  Thank you

## 2022-02-02 RX ORDER — ATORVASTATIN CALCIUM 10 MG/1
10 TABLET, FILM COATED ORAL DAILY
Qty: 90 TABLET | Refills: 0 | Status: SHIPPED | OUTPATIENT
Start: 2022-02-02 | End: 2022-12-16

## 2022-02-02 RX ORDER — LOSARTAN POTASSIUM AND HYDROCHLOROTHIAZIDE 12.5; 5 MG/1; MG/1
1 TABLET ORAL DAILY
Qty: 90 TABLET | Refills: 0 | Status: SHIPPED | OUTPATIENT
Start: 2022-02-02 | End: 2022-12-16

## 2022-02-02 RX ORDER — TAMSULOSIN HYDROCHLORIDE 0.4 MG/1
0.4 CAPSULE ORAL DAILY
Qty: 90 CAPSULE | Refills: 0 | Status: SHIPPED | OUTPATIENT
Start: 2022-02-02 | End: 2022-12-16

## 2022-02-02 NOTE — TELEPHONE ENCOUNTER
#90 of each granted.  No further refills without office visit.  This needs to be face-to-face for blood pressure and lab work.  Electronically signed:    Alvin Garcia PA-C

## 2022-02-12 ENCOUNTER — HEALTH MAINTENANCE LETTER (OUTPATIENT)
Age: 42
End: 2022-02-12

## 2022-02-16 ENCOUNTER — TRANSFERRED RECORDS (OUTPATIENT)
Dept: HEALTH INFORMATION MANAGEMENT | Facility: CLINIC | Age: 42
End: 2022-02-16
Payer: COMMERCIAL

## 2022-03-03 ENCOUNTER — OFFICE VISIT (OUTPATIENT)
Dept: FAMILY MEDICINE | Facility: OTHER | Age: 42
End: 2022-03-03
Payer: COMMERCIAL

## 2022-03-03 VITALS
HEART RATE: 89 BPM | TEMPERATURE: 98.5 F | BODY MASS INDEX: 44.1 KG/M2 | RESPIRATION RATE: 16 BRPM | DIASTOLIC BLOOD PRESSURE: 84 MMHG | HEIGHT: 71 IN | SYSTOLIC BLOOD PRESSURE: 138 MMHG | WEIGHT: 315 LBS | OXYGEN SATURATION: 97 %

## 2022-03-03 DIAGNOSIS — I10 HTN, GOAL BELOW 140/80: ICD-10-CM

## 2022-03-03 DIAGNOSIS — J30.89 SEASONAL ALLERGIC RHINITIS DUE TO OTHER ALLERGIC TRIGGER: ICD-10-CM

## 2022-03-03 DIAGNOSIS — E78.5 HYPERLIPIDEMIA LDL GOAL <130: ICD-10-CM

## 2022-03-03 DIAGNOSIS — J45.20 MILD INTERMITTENT ASTHMA WITHOUT COMPLICATION: ICD-10-CM

## 2022-03-03 DIAGNOSIS — Z00.00 ROUTINE HISTORY AND PHYSICAL EXAMINATION OF ADULT: ICD-10-CM

## 2022-03-03 DIAGNOSIS — Z83.49 FAMILY HISTORY OF HEMOCHROMATOSIS: ICD-10-CM

## 2022-03-03 DIAGNOSIS — K57.30 DIVERTICULOSIS OF LARGE INTESTINE WITHOUT HEMORRHAGE: ICD-10-CM

## 2022-03-03 DIAGNOSIS — G47.33 OSA (OBSTRUCTIVE SLEEP APNEA): Primary | ICD-10-CM

## 2022-03-03 DIAGNOSIS — E66.01 MORBID OBESITY (H): ICD-10-CM

## 2022-03-03 DIAGNOSIS — F32.1 MAJOR DEPRESSIVE DISORDER, SINGLE EPISODE, MODERATE (H): ICD-10-CM

## 2022-03-03 DIAGNOSIS — F41.1 GENERALIZED ANXIETY DISORDER: ICD-10-CM

## 2022-03-03 DIAGNOSIS — F17.200 TOBACCO USE DISORDER: ICD-10-CM

## 2022-03-03 DIAGNOSIS — Z80.0 FAMILY HISTORY OF MALIGNANT NEOPLASM OF GASTROINTESTINAL TRACT: ICD-10-CM

## 2022-03-03 DIAGNOSIS — Z12.11 SPECIAL SCREENING FOR MALIGNANT NEOPLASMS, COLON: ICD-10-CM

## 2022-03-03 DIAGNOSIS — Z79.899 ENCOUNTER FOR LONG-TERM (CURRENT) USE OF MEDICATIONS: ICD-10-CM

## 2022-03-03 DIAGNOSIS — Z80.0 FAMILY HISTORY OF COLON CANCER: ICD-10-CM

## 2022-03-03 DIAGNOSIS — K64.4 EXTERNAL HEMORRHOIDS: ICD-10-CM

## 2022-03-03 PROCEDURE — 99214 OFFICE O/P EST MOD 30 MIN: CPT | Mod: 25 | Performed by: PHYSICIAN ASSISTANT

## 2022-03-03 PROCEDURE — 99396 PREV VISIT EST AGE 40-64: CPT | Performed by: PHYSICIAN ASSISTANT

## 2022-03-03 ASSESSMENT — PATIENT HEALTH QUESTIONNAIRE - PHQ9
10. IF YOU CHECKED OFF ANY PROBLEMS, HOW DIFFICULT HAVE THESE PROBLEMS MADE IT FOR YOU TO DO YOUR WORK, TAKE CARE OF THINGS AT HOME, OR GET ALONG WITH OTHER PEOPLE: NOT DIFFICULT AT ALL
SUM OF ALL RESPONSES TO PHQ QUESTIONS 1-9: 1
SUM OF ALL RESPONSES TO PHQ QUESTIONS 1-9: 1

## 2022-03-03 ASSESSMENT — ANXIETY QUESTIONNAIRES
5. BEING SO RESTLESS THAT IT IS HARD TO SIT STILL: NOT AT ALL
GAD7 TOTAL SCORE: 0
1. FEELING NERVOUS, ANXIOUS, OR ON EDGE: NOT AT ALL
3. WORRYING TOO MUCH ABOUT DIFFERENT THINGS: NOT AT ALL
6. BECOMING EASILY ANNOYED OR IRRITABLE: NOT AT ALL
4. TROUBLE RELAXING: NOT AT ALL
GAD7 TOTAL SCORE: 0
7. FEELING AFRAID AS IF SOMETHING AWFUL MIGHT HAPPEN: NOT AT ALL
7. FEELING AFRAID AS IF SOMETHING AWFUL MIGHT HAPPEN: NOT AT ALL
2. NOT BEING ABLE TO STOP OR CONTROL WORRYING: NOT AT ALL
GAD7 TOTAL SCORE: 0

## 2022-03-03 ASSESSMENT — PAIN SCALES - GENERAL: PAINLEVEL: MILD PAIN (2)

## 2022-03-03 NOTE — LETTER
My Asthma Action Plan    Name: Frankie Gomez   YOB: 1980  Date: 3/3/2022   My doctor: Alvin Garcia PA-C   My clinic: [facility]        My Rescue Medicine:   Albuterol inhaler (Proair/Ventolin/Proventil HFA)  2-4 puffs EVERY 4 HOURS as needed. Use a spacer if recommended by your provider.   My Asthma Severity:   Intermittent / Exercise Induced  Know your asthma triggers: exercise or sports, emotions and cold air  upper respiratory infections          GREEN ZONE   Good Control    I feel good    No cough or wheeze    Can work, sleep and play without asthma symptoms       Take your asthma control medicine every day.     1. If exercise triggers your asthma, take your rescue medication    15 minutes before exercise or sports, and    During exercise if you have asthma symptoms  2. Spacer to use with inhaler: If you have a spacer, make sure to use it with your inhaler             YELLOW ZONE Getting Worse  I have ANY of these:    I do not feel good    Cough or wheeze    Chest feels tight    Wake up at night   1. Keep taking your Green Zone medications  2. Start taking your rescue medicine:    every 20 minutes for up to 1 hour. Then every 4 hours for 24-48 hours.  3. If you stay in the Yellow Zone for more than 12-24 hours, contact your doctor.  4. If you do not return to the Green Zone in 12-24 hours or you get worse, start taking your oral steroid medicine if prescribed by your provider.           RED ZONE Medical Alert - Get Help  I have ANY of these:    I feel awful    Medicine is not helping    Breathing getting harder    Trouble walking or talking    Nose opens wide to breathe       1. Take your rescue medicine NOW  2. If your provider has prescribed an oral steroid medicine, start taking it NOW  3. Call your doctor NOW  4. If you are still in the Red Zone after 20 minutes and you have not reached your doctor:    Take your rescue medicine again and    Call 911 or go to the  emergency room right away    See your regular doctor within 2 weeks of an Emergency Room or Urgent Care visit for follow-up treatment.          Annual Reminders:  Meet with Asthma Educator,  Flu Shot in the Fall, consider Pneumonia Vaccination for patients with asthma (aged 19 and older).    Pharmacy:    [facility] [place] - [address] [facility] - [place], [place] - [address] AT PORTAL TO REGISTERED [facility] SITES  [id number] - [place], [place] - [address]    Electronically signed by Alvin Garcia PA-C   Date: 03/03/22                    Asthma Triggers  How To Control Things That Make Your Asthma Worse    Triggers are things that make your asthma worse.  Look at the list below to help you find your triggers and   what you can do about them. You can help prevent asthma flare-ups by staying away from your triggers.      Trigger                                                          What you can do   Cigarette Smoke  Tobacco smoke can make asthma worse. Do not allow smoking in your home, car or around you.  Be sure no one smokes at a child s day care or school.  If you smoke, ask your health care provider for ways to help you quit.  Ask family members to quit too.  Ask your health care provider for a referral to Quit Plan to help you quit smoking, or call 8-844-622-PLAN.     Colds, Flu, Bronchitis  These are common triggers of asthma. Wash your hands often.  Don t touch your eyes, nose or mouth.  Get a flu shot every year.     Dust Mites  These are tiny bugs that live in cloth or carpet. They are too small to see. Wash sheets and blankets in hot water every week.   Encase pillows and mattress in dust mite proof covers.  Avoid having carpet if you can. If you have carpet, vacuum weekly.   Use a dust mask and HEPA vacuum.   Pollen and Outdoor Mold  Some people are allergic to trees, grass, or weed pollen, or molds. Try to  keep your windows closed.  Limit time out doors when pollen count is high.   Ask you health care provider about taking medicine during allergy season.     Animal Dander  Some people are allergic to skin flakes, urine or saliva from pets with fur or feathers. Keep pets with fur or feathers out of your home.    If you can t keep the pet outdoors, then keep the pet out of your bedroom.  Keep the bedroom door closed.  Keep pets off cloth furniture and away from stuffed toys.     Mice, Rats, and Cockroaches  Some people are allergic to the waste from these pests.   Cover food and garbage.  Clean up spills and food crumbs.  Store grease in the refrigerator.   Keep food out of the bedroom.   Indoor Mold  This can be a trigger if your home has high moisture. Fix leaking faucets, pipes, or other sources of water.   Clean moldy surfaces.  Dehumidify basement if it is damp and smelly.   Smoke, Strong Odors, and Sprays  These can reduce air quality. Stay away from strong odors and sprays, such as perfume, powder, hair spray, paints, smoke incense, paint, cleaning products, candles and new carpet.   Exercise or Sports  Some people with asthma have this trigger. Be active!  Ask your doctor about taking medicine before sports or exercise to prevent symptoms.    Warm up for 5-10 minutes before and after sports or exercise.     Other Triggers of Asthma  Cold air:  Cover your nose and mouth with a scarf.  Sometimes laughing or crying can be a trigger.  Some medicines and food can trigger asthma.

## 2022-03-03 NOTE — PROGRESS NOTES
Assessment & Plan     Routine history and physical examination of adult  41-year-old male here for physical exam and recheck of blood pressure.  Morbid obesity noted.  He has gained weight since the last time that we saw him.    Encounter for long-term (current) use of medications    NASIR (obstructive sleep apnea)  Still problematic and he has not been using his CPAP on a regular basis.  We discussed him seeing a ear nose and throat specialist and surgeon for potential surgical correction of the upper airway and the neck steps related to untreated or poorly treated obstructive sleep apnea.  - Comprehensive metabolic panel (BMP + Alb, Alk Phos, ALT, AST, Total. Bili, TP); Future  - CBC with platelets and differential; Future  - Iron and iron binding capacity; Future  - TSH with free T4 reflex; Future  - Otolaryngology Referral; Future    Mild intermittent asthma without complication  No new concerns with respect to this he is doing very well and does not need refills of his medication at this point time.  Follow-up in 1 year is advised.  Asthma action plan completed today.  - Comprehensive metabolic panel (BMP + Alb, Alk Phos, ALT, AST, Total. Bili, TP); Future  - CBC with platelets and differential; Future  - Iron and iron binding capacity; Future  - TSH with free T4 reflex; Future  - Otolaryngology Referral; Future    HTN, goal below 140/80  Good control this at this point time though is on the high side of normal.  Follow-up in 6 months as advised.  Labs related to this are due in the near future.  - Comprehensive metabolic panel (BMP + Alb, Alk Phos, ALT, AST, Total. Bili, TP); Future  - CBC with platelets and differential; Future  - Iron and iron binding capacity; Future  - TSH with free T4 reflex; Future  - Otolaryngology Referral; Future    Tobacco use disorder  No longer smoking for which we are thankful.  - Comprehensive metabolic panel (BMP + Alb, Alk Phos, ALT, AST, Total. Bili, TP); Future  - CBC with  "platelets and differential; Future  - Iron and iron binding capacity; Future    Generalized anxiety disorder  - Comprehensive metabolic panel (BMP + Alb, Alk Phos, ALT, AST, Total. Bili, TP); Future  - CBC with platelets and differential; Future  - Iron and iron binding capacity; Future  - TSH with free T4 reflex; Future    Major depressive disorder, single episode, moderate (H)  - TSH with free T4 reflex; Future    Hyperlipidemia LDL goal <130  - Comprehensive metabolic panel (BMP + Alb, Alk Phos, ALT, AST, Total. Bili, TP); Future  - CBC with platelets and differential; Future  - Iron and iron binding capacity; Future  - TSH with free T4 reflex; Future    Family history of hemochromatosis  Recently discovered that his father has hemochromatosis and he is wondering if he should have any other further work-up done.  We will have him see genetics but prior to that we will have labs done to see if there is increased risk factors that need to be addressed sooner rather than later.  - Adult Genetics & Metabolism Referral; Future    Morbid obesity (H)  - Otolaryngology Referral; Future    Family history of colon cancer  Family history of malignant neoplasm of gastrointestinal tract  Special screening for malignant neoplasms, colon  Diverticulosis of large intestine without hemorrhage  External hemorrhoids  Maternal uncle with colon cancer in remission.  Last colonoscopy was in 2016 and he wishes to have this repeated with no information as noted above.  - Adult Gastro Ref - Procedure Only; Future    Seasonal allergic rhinitis due to other allergic trigger  No new concerns with respect to this continues to treat with over-the-counter medications.     BMI:   Estimated body mass index is 53.74 kg/m  as calculated from the following:    Height as of this encounter: 1.791 m (5' 10.51\").    Weight as of this encounter: 172.4 kg (380 lb).   Weight management plan: Discussed healthy diet and exercise guidelines    Work on weight " loss  Regular exercise    No follow-ups on file.    Alvin Garcia PA-C  [facility] MINI Swenson is a 41 year old who presents for the following health issues     History of Present Illness     Asthma:  He presents for follow up of asthma.  He has no cough, no wheezing, and no shortness of breath. He is using a relief medication a few times a month. He does not miss any doses of his controller medication throughout the week.Patient is aware of the following triggers: none. The patient has not had a visit to the Emergency Room, Urgent Care or Hospital due to asthma since the last clinic visit.     Back Pain:  He presents for follow up of back pain. Patient's back pain is a chronic problem.  Location of back pain:  Right lower back, left lower back, right middle of back, left middle of back, left side of neck, left shoulder, right hip and left hip  Description of back pain: dull ache, sharp, shooting and stabbing  Back pain spreads: right buttocks, left buttocks, right knee and left knee    Since patient first noticed back pain, pain is: always present, but gets better and worse  Does back pain interfere with his job:  Yes      Mental Health Follow-up:  Patient presents to follow-up on Depression & Anxiety.Patient's depression since last visit has been:  No change  The patient is not having other symptoms associated with depression.  Patient's anxiety since last visit has been:  No change  The patient is not having other symptoms associated with anxiety.  Any significant life events: relationship concerns, financial concerns and housing concerns  Patient is not feeling anxious or having panic attacks.  Patient has no concerns about alcohol or drug use.     Social History  Tobacco Use    Smoking status: Former Smoker      Packs/day: 0.75      Years: 13.00      Pack years: 9.75      Types: Cigarettes      Quit date: 4/15/2013      Years since quittin.8    Smokeless tobacco: Never Used     Tobacco comment: sometimes uses girlfriends vape - random  Vaping Use    Vaping Use: Some days  Alcohol use: No  Drug use: Yes    Types: Marijuana    Comment: marijuana occasional use      Today's PHQ-9         PHQ-9 Total Score:     1   PHQ-9 Q9 Thoughts of better off dead/self-harm past 2 weeks :   Not at all   Thoughts of suicide or self harm:      Self-harm Plan:        Self-harm Action:          Safety concerns for self or others:           Hyperlipidemia:  He presents for follow up of hyperlipidemia.  He is taking medication to lower cholesterol. He is not having myalgia or other side effects to statin medications.    Hypertension: He presents for follow up of hypertension.  He does not check blood pressure  regularly outside of the clinic. Outpatient blood pressures have not been over 140/90. He does not follow a low salt diet.   Reason for visit:  Checkup  Symptom onset:  More than a month  Symptoms include:  Pain in back, neck, shoulder, knees, and feet.  Symptom intensity:  Moderate  Symptom progression:  Staying the same  Had these symptoms before:  Yes  Has tried/received treatment for these symptoms:  Yes  Previous treatment was successful:  Yes  Prior treatment description:  Injection, steroids  What makes it worse:  Standing long peroids  What makes it better:  Laying down    He eats 2-3 servings of fruits and vegetables daily.He consumes 2 sweetened beverage(s) daily.He exercises with enough effort to increase his heart rate 10 to 19 minutes per day.  He exercises with enough effort to increase his heart rate 4 days per week. He is missing 2 dose(s) of medications per week.  He is not taking prescribed medications regularly due to remembering to take.     Review of Systems   Constitutional, HEENT, cardiovascular, pulmonary, GI, , musculoskeletal, neuro, skin, endocrine and psych systems are negative, except as otherwise noted.      Objective    /84   Pulse 89   Temp 98.5  F (36.9  C)  "(Temporal)   Resp 16   Ht 1.791 m (5' 10.51\")   Wt (!) 172.4 kg (380 lb)   SpO2 97%   BMI 53.74 kg/m    Body mass index is 53.74 kg/m .  Physical Exam   GENERAL: healthy, alert and no distress  EYES: Eyes grossly normal to inspection, PERRL and conjunctivae and sclerae normal  HENT: ear canals and TM's normal, nose and mouth without ulcers or lesions though the oral cavity is crowded due to morbid obesity and an enlarged large tongue.  NECK: no adenopathy, no asymmetry, masses, or scars and thyroid normal to palpation  RESP: lungs clear to auscultation - no rales, rhonchi or wheezes  CV: regular rate and rhythm, normal S1 S2, no S3 or S4, no murmur, click or rub, no peripheral edema and peripheral pulses strong  ABDOMEN: Morbidly obese, soft, nontender, no hepatosplenomegaly, no masses and bowel sounds normal  MS: no gross musculoskeletal defects noted, no edema  SKIN: no suspicious lesions or rashes to exposed visible skin today.  NEURO: Normal strength and tone, mentation intact and speech normal  PSYCH: mentation appears normal, affect normal/bright  Genital exam was deferred today    No results found for this or any previous visit (from the past 24 hour(s)).          Answers for HPI/ROS submitted by the patient on 3/3/2022  If you checked off any problems, how difficult have these problems made it for you to do your work, take care of things at home, or get along with other people?: Not difficult at all  PHQ9 TOTAL SCORE: 1  MARISOL 7 TOTAL SCORE: 0      "

## 2022-03-04 ENCOUNTER — TELEPHONE (OUTPATIENT)
Dept: GASTROENTEROLOGY | Facility: CLINIC | Age: 42
End: 2022-03-04
Payer: COMMERCIAL

## 2022-03-04 DIAGNOSIS — Z11.59 ENCOUNTER FOR SCREENING FOR OTHER VIRAL DISEASES: Primary | ICD-10-CM

## 2022-03-04 ASSESSMENT — ANXIETY QUESTIONNAIRES: GAD7 TOTAL SCORE: 0

## 2022-03-04 NOTE — TELEPHONE ENCOUNTER
Screening Questions  BlueKIND OF PREP RedLOCATION [review exclusion criteria] GreenSEDATION TYPE    1. Have you had a positive covid test in the last 90 days? YES     2. Are you active on mychart? YES    3. What insurance is in the chart? BCBS     3.  Ordering/Referring Provider:     4. BMI 53.8 [BMI OVER 40-EXTENDED PREP]  If greater than 40 review exclusion criteria [PAC APPT IF @ [facility]]    5.  Respiratory Screening :  [If yes to any of the following HOSPITAL setting only]     Do you use daily home oxygen? NO    Do you have mod to severe Obstructive Sleep Apnea? NO  [OKAY @ [facility] SH PH RI]   Do you have Pulmonary Hypertension? NO     Do you have UNCONTROLLED asthma? NO      6. Have you had a heart or lung transplant? NO      7. Are you currently on dialysis? NO [ If yes, G-PREP & HOSPITAL setting only]     8. Do you have chronic kidney disease? NO [ If yes, G-PREP ]    9. Have you had a stroke or Transient ischemic attack (TIA) within 6 months? NO (If yes, do not schedule at [facility])    10. In the past 6 months, have you had any heart related issues including cardiomyopathy or heart attack? NO        If yes, did it require cardiac stenting or other implantable device?       11. Do you have any implantable devices in your body (pacemaker, defib, LVAD)? NO (If yes, schedule at [facility])    12. Do you take nitroglycerin? NO   If yes, how often?   (if yes, HOSPITAL setting ONLY)    13. Are you currently taking any blood thinners? YES, TYLENOL  [IF YES, INFORM PATIENT TO FOLLOW UP W/ ORDERING PROVIDER FOR BRIDGING INSTRUCTIONS]     14. Are you a diabetic? NO   [ If yes, G-PREP ]    15. [FEMALES] Are you currently pregnant?     If yes, how many weeks?     16. Are you taking any prescription pain medications on a routine schedule?  NO  [ If yes, EXTENDED PREP.] [If yes, MAC]    17. Do you have any chemical dependencies such as alcohol, street drugs, or methadone?  NO [If yes, MAC]    18. Do you have any history of  post-traumatic stress syndrome, severe anxiety or history of psychosis?  NO  [If yes, MAC]    19. Do you transfer independently?  YES    20.  Do you have any issues with constipation?  NO  [ If yes, EXTENDED PREP.]    21. Preferred LOCAL Pharmacy for Pre Prescription     [facility] - [place], [place] - [address] [facility] [place] [facility] [address] AT PORTAL TO REGISTERED [id number] - [place], [place] - [address] [facility]  Scheduling Details      Caller : MAURICIO  (Please ask for phone number if not scheduled by patient)    Type of Procedure Scheduled: COLONOSCOPY  Which Colonoscopy Prep was Sent?: EXTENDED  KHORUTS CF PATIENTS & GROEN'S PATIENTS NEEDS EXTENDED PREP  Surgeon: BI GUZMAN  Date of Procedure: 03/30/2022  Location: [place]      Sedation Type: CS  Conscious Sedation- Needs  for 6 hours after the procedure  MAC/General-Needs  for 24 hours after procedure    Pre-op Required at [facility], [place], [place] and OR for MAC sedation: NO  (advise patient they will need a pre-op prior to procedure -)      Informed patient they will need an adult  YES  Cannot take any type of public or medical transportation alone    Pre-Procedure Covid test to be completed at [facility] or Externally: YES    Confirmed Nurse will call to complete assessment YES    Additional comments: NA

## 2022-03-27 ENCOUNTER — LAB (OUTPATIENT)
Dept: LAB | Facility: CLINIC | Age: 42
End: 2022-03-27
Attending: INTERNAL MEDICINE
Payer: COMMERCIAL

## 2022-03-27 DIAGNOSIS — Z11.59 ENCOUNTER FOR SCREENING FOR OTHER VIRAL DISEASES: ICD-10-CM

## 2022-03-27 PROCEDURE — U0005 INFEC AGEN DETEC AMPLI PROBE: HCPCS

## 2022-03-27 PROCEDURE — U0003 INFECTIOUS AGENT DETECTION BY NUCLEIC ACID (DNA OR RNA); SEVERE ACUTE RESPIRATORY SYNDROME CORONAVIRUS 2 (SARS-COV-2) (CORONAVIRUS DISEASE [COVID-19]), AMPLIFIED PROBE TECHNIQUE, MAKING USE OF HIGH THROUGHPUT TECHNOLOGIES AS DESCRIBED BY CMS-2020-01-R: HCPCS

## 2022-03-28 LAB — SARS-COV-2 RNA RESP QL NAA+PROBE: NEGATIVE

## 2022-03-29 ENCOUNTER — ANESTHESIA EVENT (OUTPATIENT)
Dept: GASTROENTEROLOGY | Facility: CLINIC | Age: 42
End: 2022-03-29
Payer: COMMERCIAL

## 2022-03-29 ASSESSMENT — ENCOUNTER SYMPTOMS: DYSRHYTHMIAS: 1

## 2022-03-29 ASSESSMENT — LIFESTYLE VARIABLES: TOBACCO_USE: 1

## 2022-03-29 NOTE — ANESTHESIA PREPROCEDURE EVALUATION
Anesthesia Pre-Procedure Evaluation    Patient: Frankie Gomez   MRN: [id number] : 1980        Procedure : Procedure(s):  COLONOSCOPY          Past Medical History:   Diagnosis Date     Chronic low back pain     fall injury, fell through stairs-hit concrete     Depression      Diverticulitis      Fatty liver disease, nonalcoholic      Former smoker      MARISOL (generalised anxiety disorder)      Hypertension      Morbid obesity (H)      OA (osteoarthritis)      NASIR (obstructive sleep apnea) 2014    AHI 5.2     Panic      SVT (supraventricular tachycardia) (H)     sp ablation of AVNRT 2015     Unspecified sinusitis (chronic)       Past Surgical History:   Procedure Laterality Date     APPENDECTOMY  09     COLONOSCOPY N/A 2016    Procedure: COMBINED COLONOSCOPY, SINGLE OR MULTIPLE BIOPSY/POLYPECTOMY BY BIOPSY;  Surgeon: Rikki Jenkins MD;  Location: [facility] GI     ESOPHAGOSCOPY, GASTROSCOPY, DUODENOSCOPY (EGD), COMBINED N/A 2016    Procedure: COMBINED ESOPHAGOSCOPY, GASTROSCOPY, DUODENOSCOPY (EGD), BIOPSY SINGLE OR MULTIPLE;  Surgeon: Rikki Jenkins MD;  Location:  GI     EXCISE MASS FOOT Right 2015    Procedure: EXCISE MASS FOOT;  Surgeon: Ramo Decker DPM;  Location: [facility] OR     LAPAROSCOPIC CHOLECYSTECTOMY N/A 2017    Procedure: LAPAROSCOPIC CHOLECYSTECTOMY;  Surgeon: Toby Bishop MD;  Location:  OR     [facility] COLONOSCOPY W SNARE REMOVAL TUMOR/POLYP/LESION  1998      Allergies   Allergen Reactions     No Known Allergies       Social History     Tobacco Use     Smoking status: Former Smoker     Packs/day: 0.75     Years: 13.00     Pack years: 9.75     Types: Cigarettes     Quit date: 4/15/2013     Years since quittin.9     Smokeless tobacco: Never Used     Tobacco comment: sometimes uses girlfriends vape - random   Substance Use Topics     Alcohol use: No      Wt Readings from Last 1 Encounters:   22 (!) 172.4 kg (380 lb)        Anesthesia  Evaluation            ROS/MED HX  ENT/Pulmonary: Comment: Quit Smokin/15/13    (+) sleep apnea, allergic rhinitis, tobacco use, 10  Pack-Year Hx,  Intermittent, asthma     Neurologic:  - neg neurologic ROS     Cardiovascular: Comment: HX SVT    (+) hypertension-range: < 1450/80/ ----dysrhythmias, Irregular Heartbeat/Palpitations, Previous cardiac testing   Echo: Date: Results:    Stress Test: Date: Results:    ECG Reviewed: Date: 16 Results:  Sinus Rhythm   -Prominent R(V1) and left axis for age -nonspecific -Seen with pulmonary or congenital heart disease -possible anterior fascicular block.     ABNORMAL     Cath: Date: Results:      METS/Exercise Tolerance:     Hematologic:  - neg hematologic  ROS     Musculoskeletal:  - neg musculoskeletal ROS     GI/Hepatic: Comment: Fatty liver disease - nonalcoholic    (+) liver disease,     Renal/Genitourinary:       Endo: Comment: BMI:  53.73    (+) Obesity,     Psychiatric/Substance Use:     (+) psychiatric history depression and anxiety Recreational drug usage: Cannabis.    Infectious Disease:  - neg infectious disease ROS     Malignancy:  - neg malignancy ROS     Other:            Physical Exam    Airway  airway exam normal      Mallampati: III   TM distance: > 3 FB   Neck ROM: full   Mouth opening: > 3 cm    Respiratory Devices and Support         Dental  no notable dental history         Cardiovascular   cardiovascular exam normal       Rhythm and rate: regular and normal     Pulmonary   pulmonary exam normal        breath sounds clear to auscultation           OUTSIDE LABS:  CBC:   Lab Results   Component Value Date    WBC 6.0 2020    WBC 7.6 2019    HGB 14.7 2020    HGB 13.7 2019    HCT 43.9 2020    HCT 40.6 2019     2020     (L) 2019     BMP:   Lab Results   Component Value Date     2020     2019    POTASSIUM 4.1 2020    POTASSIUM 4.2 2019    CHLORIDE 111 (H)  12/31/2020    CHLORIDE 105 09/23/2019    CO2 25 12/31/2020    CO2 27 09/23/2019    BUN 18 12/31/2020    BUN 8 09/23/2019    CR 0.67 12/31/2020    CR 0.77 09/23/2019     (H) 12/31/2020    GLC 97 09/23/2019     COAGS: No results found for: PTT, INR, FIBR  POC:   Lab Results   Component Value Date     (H) 02/14/2017     HEPATIC:   Lab Results   Component Value Date    ALBUMIN 3.9 12/31/2020    PROTTOTAL 7.6 12/31/2020    ALT 32 12/31/2020    AST 20 12/31/2020    ALKPHOS 122 12/31/2020    BILITOTAL 0.8 12/31/2020     OTHER:   Lab Results   Component Value Date    PH 7.42 11/18/2014    LACT 1.6 09/16/2019    A1C 5.5 06/01/2016    LYNNE 8.5 12/31/2020    LIPASE 168 12/15/2016    AMYLASE 51 12/15/2016    TSH 1.32 12/31/2020    CRP 43.0 (H) 09/16/2019    SED 20 (H) 09/16/2019       Anesthesia Plan    ASA Status:  2   NPO Status:  NPO Appropriate    Anesthesia Type: MAC.     - Reason for MAC: straight local not clinically adequate   Induction: Intravenous, Propofol.   Maintenance: TIVA.        Consents    Anesthesia Plan(s) and associated risks, benefits, and realistic alternatives discussed. Questions answered and patient/representative(s) expressed understanding.    - Discussed:     - Discussed with:  Patient      - Extended Intubation/Ventilatory Support Discussed: No.      - Patient is DNR/DNI Status: No    Use of blood products discussed: No .     Postoperative Care    Pain management: IV analgesics.   PONV prophylaxis: Ondansetron (or other 5HT-3), Dexamethasone or Solumedrol     Comments:    Other Comments: The risks and benefits of anesthesia, and the alternatives where applicable, have been discussed with the patient, and they wish to proceed.            TASHA Reyez CRNA

## 2022-03-30 ENCOUNTER — ANESTHESIA (OUTPATIENT)
Dept: GASTROENTEROLOGY | Facility: CLINIC | Age: 42
End: 2022-03-30
Payer: COMMERCIAL

## 2022-03-30 ENCOUNTER — HOSPITAL ENCOUNTER (OUTPATIENT)
Facility: CLINIC | Age: 42
Discharge: HOME | End: 2022-03-30
Attending: INTERNAL MEDICINE | Admitting: INTERNAL MEDICINE
Payer: COMMERCIAL

## 2022-03-30 VITALS
SYSTOLIC BLOOD PRESSURE: 131 MMHG | WEIGHT: 315 LBS | HEART RATE: 77 BPM | RESPIRATION RATE: 18 BRPM | OXYGEN SATURATION: 97 % | DIASTOLIC BLOOD PRESSURE: 74 MMHG | BODY MASS INDEX: 53.74 KG/M2 | TEMPERATURE: 98.7 F

## 2022-03-30 DIAGNOSIS — Z12.11 COLON CANCER SCREENING: Primary | ICD-10-CM

## 2022-03-30 LAB — COLONOSCOPY: NORMAL

## 2022-03-30 PROCEDURE — 258N000003 HC RX IP 258 OP 636: Performed by: NURSE ANESTHETIST, CERTIFIED REGISTERED

## 2022-03-30 PROCEDURE — 250N000009 HC RX 250: Performed by: NURSE ANESTHETIST, CERTIFIED REGISTERED

## 2022-03-30 PROCEDURE — 45385 COLONOSCOPY W/LESION REMOVAL: CPT | Mod: PT | Performed by: INTERNAL MEDICINE

## 2022-03-30 PROCEDURE — 88305 TISSUE EXAM BY PATHOLOGIST: CPT | Mod: 26 | Performed by: PATHOLOGY

## 2022-03-30 PROCEDURE — 250N000011 HC RX IP 250 OP 636: Performed by: NURSE ANESTHETIST, CERTIFIED REGISTERED

## 2022-03-30 PROCEDURE — 45380 COLONOSCOPY AND BIOPSY: CPT | Performed by: INTERNAL MEDICINE

## 2022-03-30 PROCEDURE — 88305 TISSUE EXAM BY PATHOLOGIST: CPT | Mod: TC | Performed by: INTERNAL MEDICINE

## 2022-03-30 PROCEDURE — 370N000017 HC ANESTHESIA TECHNICAL FEE, PER MIN: Performed by: INTERNAL MEDICINE

## 2022-03-30 RX ORDER — PROPOFOL 10 MG/ML
INJECTION, EMULSION INTRAVENOUS CONTINUOUS PRN
Status: DISCONTINUED | OUTPATIENT
Start: 2022-03-30 | End: 2022-03-30

## 2022-03-30 RX ORDER — PROPOFOL 10 MG/ML
INJECTION, EMULSION INTRAVENOUS PRN
Status: DISCONTINUED | OUTPATIENT
Start: 2022-03-30 | End: 2022-03-30

## 2022-03-30 RX ORDER — LIDOCAINE 40 MG/G
CREAM TOPICAL
Status: DISCONTINUED | OUTPATIENT
Start: 2022-03-30 | End: 2022-03-30 | Stop reason: HOSPADM

## 2022-03-30 RX ORDER — LIDOCAINE HYDROCHLORIDE 20 MG/ML
INJECTION, SOLUTION INFILTRATION; PERINEURAL PRN
Status: DISCONTINUED | OUTPATIENT
Start: 2022-03-30 | End: 2022-03-30

## 2022-03-30 RX ORDER — SODIUM CHLORIDE, SODIUM LACTATE, POTASSIUM CHLORIDE, CALCIUM CHLORIDE 600; 310; 30; 20 MG/100ML; MG/100ML; MG/100ML; MG/100ML
INJECTION, SOLUTION INTRAVENOUS CONTINUOUS
Status: DISCONTINUED | OUTPATIENT
Start: 2022-03-30 | End: 2022-03-30 | Stop reason: HOSPADM

## 2022-03-30 RX ADMIN — SODIUM CHLORIDE, POTASSIUM CHLORIDE, SODIUM LACTATE AND CALCIUM CHLORIDE: 600; 310; 30; 20 INJECTION, SOLUTION INTRAVENOUS at 13:52

## 2022-03-30 RX ADMIN — PROPOFOL 150 MCG/KG/MIN: 10 INJECTION, EMULSION INTRAVENOUS at 14:00

## 2022-03-30 RX ADMIN — PROPOFOL 100 MG: 10 INJECTION, EMULSION INTRAVENOUS at 14:00

## 2022-03-30 RX ADMIN — LIDOCAINE HYDROCHLORIDE 100 MG: 20 INJECTION, SOLUTION INFILTRATION; PERINEURAL at 14:00

## 2022-03-30 NOTE — H&P
[facility] Anesthesia Pre-op History and Physical    Frankie Gomez MRN# [id number]   Age: 41 year old YOB: 1980      Date of Surgery: 3/30/2022 Location [facility]      Date of Exam 3/30/2022 Facility (Same day)       Home clinic: [facility]  Primary care provider: Alvin Barrett         Chief Complaint and/or Reason for Procedure:   No chief complaint on file.  Colonoscopy.           Active problem list:     Patient Active Problem List    Diagnosis Date Noted     Hyperlipidemia LDL goal <130 03/03/2022     Priority: Medium     Family history of hemochromatosis 03/03/2022     Priority: Medium     Family history of colon cancer 03/03/2022     Priority: Medium     Mild intermittent asthma without complication 05/10/2021     Priority: Medium     Left carpal tunnel syndrome 01/13/2021     Priority: Medium     Right carpal tunnel syndrome 01/13/2021     Priority: Medium     Tension headache 09/19/2019     Priority: Medium     Other infectious mononucleosis with other complication 09/19/2019     Priority: Medium     Seasonal allergic rhinitis due to other allergic trigger 07/30/2019     Priority: Medium     Post-nasal drip 07/30/2019     Priority: Medium     Increased frequency of urination 07/30/2019     Priority: Medium     S/P laparoscopic cholecystectomy 02/13/2017     Priority: Medium     Diverticulosis of large intestine 01/01/2017     Priority: Medium     External hemorrhoids 01/01/2017     Priority: Medium     SVT (supraventricular tachycardia) (H) 05/27/2015     Priority: Medium     NASIR (obstructive sleep apnea) 12/15/2014     Priority: Medium     [place] 11/20/2014 AHI 5.2 RDI 11.8        Heartburn      Priority: Medium     Morbid obesity (H)      Priority: Medium     HTN, goal below 140/80      Priority: Medium     Fatty liver disease, nonalcoholic      Priority: Medium     Generalized anxiety disorder      Priority: Medium      Diagnosis updated by automated process. Provider to review and confirm.       Panic      Priority: Medium     Chronic low back pain      Priority: Medium     fall injury, fell through stairs-hit concrete       Lumbosacral neuritis 01/28/2013     Priority: Medium     CARDIOVASCULAR SCREENING; LDL GOAL LESS THAN 130 02/02/2011     Priority: Medium     Major depressive disorder, single episode, moderate (H) 02/02/2011     Priority: Medium     Displacement of intervertebral disc, site unspecified, without myelopathy 02/12/2007     Priority: Medium     Tobacco use disorder 11/21/2006     Priority: Medium     Pain in joint, forearm 06/25/2006     Priority: Medium     Degeneration of lumbar or lumbosacral intervertebral disc 08/10/2005     Priority: Medium            Medications (include herbals and vitamins):   Any Plavix use in the last 7 days? No     Current Facility-Administered Medications   Medication     triamcinolone (KENALOG-40) injection 80 mg     Current Outpatient Medications   Medication Sig     Acetaminophen (TYLENOL PO) Take 1,000 mg by mouth daily Reported on 2/13/2017     albuterol (PROAIR HFA/PROVENTIL HFA/VENTOLIN HFA) 108 (90 Base) MCG/ACT inhaler Inhale 2 puffs into the lungs every 6 hours     atorvastatin (LIPITOR) 10 MG tablet Take 1 tablet (10 mg) by mouth daily     clobetasol (TEMOVATE) 0.05 % external cream Apply topically 2 times daily     ibuprofen (ADVIL/MOTRIN) 800 MG tablet Take 1 tablet (800 mg) by mouth every 8 hours as needed Reported on 2/13/2017     losartan-hydrochlorothiazide (HYZAAR) 50-12.5 MG tablet Take 1 tablet by mouth daily     omeprazole (PRILOSEC) 40 MG capsule Take 40 mg by mouth as needed Reported on 2/13/2017     tamsulosin (FLOMAX) 0.4 MG capsule Take 1 capsule (0.4 mg) by mouth daily             Allergies:      Allergies   Allergen Reactions     No Known Allergies      Allergy to Latex? No  Allergy to tape?   No  Intolerances:             Physical Exam:   All vitals have  been reviewed  No data found.  No intake/output data recorded.  Lungs:   No increased work of breathing, good air exchange, clear to auscultation bilaterally, no crackles or wheezing     Cardiovascular:   Normal apical impulse, regular rate and rhythm, normal S1 and S2, no S3 or S4, and no murmur noted             Lab / Radiology Results:            Anesthetic risk and/or ASA classification:       Drew Zimmer MD

## 2022-03-30 NOTE — ANESTHESIA POSTPROCEDURE EVALUATION
Patient: Frankie Gomez    Procedure: Procedure(s):  COLONOSCOPY, WITH POLYPECTOMY AND BIOPSY       Anesthesia Type:  MAC    Note:  Disposition: Outpatient   Postop Pain Control: Uneventful            Sign Out: Well controlled pain   PONV: No   Neuro/Psych: Uneventful            Sign Out: Acceptable/Baseline neuro status   Airway/Respiratory: Uneventful            Sign Out: AIRWAY IN SITU/Resp. Support   CV/Hemodynamics: Uneventful            Sign Out: Acceptable CV status   Other NRE: NONE   DID A NON-ROUTINE EVENT OCCUR? No    Event details/Postop Comments:  Pt was happy with anesthesia care.  No complications.  I will follow up with the pt if needed.           Last vitals:  Vitals Value Taken Time   /54 03/30/22 1430   Temp     Pulse 87 03/30/22 1430   Resp     SpO2 95 % 03/30/22 1438   Vitals shown include unvalidated device data.    Electronically Signed By: TASHA Reyez CRNA  March 30, 2022  2:40 PM

## 2022-03-30 NOTE — ANESTHESIA CARE TRANSFER NOTE
Patient: Frankie Gomez    Procedure: Procedure(s):  COLONOSCOPY, WITH POLYPECTOMY AND BIOPSY       Diagnosis: Family history of colon cancer [Z80.0]  Special screening for malignant neoplasms, colon [Z12.11]  Family history of malignant neoplasm of gastrointestinal tract [Z80.0]  Diagnosis Additional Information: No value filed.    Anesthesia Type:   MAC     Note:    Oropharynx: spontaneously breathing  Level of Consciousness: awake  Oxygen Supplementation: room air    Independent Airway: airway patency satisfactory and stable  Dentition: dentition unchanged  Vital Signs Stable: post-procedure vital signs reviewed and stable  Report to RN Given: handoff report given  Patient transferred to: Phase II    Handoff Report: Identifed the Patient, Identified the Reponsible Provider, Reviewed the pertinent medical history, Discussed the surgical course, Reviewed Intra-OP anesthesia mangement and issues during anesthesia, Set expectations for post-procedure period and Allowed opportunity for questions and acknowledgement of understanding      Vitals:  Vitals Value Taken Time   /54 03/30/22 1430   Temp     Pulse 87 03/30/22 1430   Resp     SpO2 95 % 03/30/22 1432   Vitals shown include unvalidated device data.    Electronically Signed By: TASHA Reyez CRNA  March 30, 2022  2:33 PM

## 2022-03-30 NOTE — DISCHARGE INSTRUCTIONS
[facility]    Home Care Following Endoscopy  Dr. Zimmer      Activity:    You have just undergone an endoscopic procedure usually performed with conscious sedation.  Do not work or operate machinery (including a car) for at least 12 hours.      I encourage you to walk and attempt to pass this air as soon as possible.    Diet:    Return to the diet you were on before your procedure but eat lightly for the first 12-24 hours.    Drink plenty of water.    Resume any regular medications unless otherwise advised by your physician.  Please begin any new medication prescribed as a result of your procedure as directed by your physician.     If you had any biopsy or polyp removed please refrain from aspirin or aspirin products for 2 days.  If on Coumadin please restart as instructed by your physician.   Pain:    You may take Tylenol as needed for pain.  Expected Recovery:      You can expect some mild abdominal fullness and/or discomfort due to the air used to inflate your intestinal tract.    Call Your Physician if You Have:      After Colonoscopy:  o Worsening persisting abdominal pain which is worse with activity.  o Fevers (>101 degrees F), chills or shakes.  o Passage of continued blood with bowel movements.     Any questions or concerns about your recovery, please call [phone] or after hours [phone] Nurse Advice Line.    Follow-up Care:  You did have polyps/biopsy tissue sample(s) removed.  The polyps/biopsy tissue sample(s) will be sent to pathology.    You should receive letter in your My Chart with your results within 1-2 weeks. If you do not participate in My Chart a physical letter will come in the mail in 2-3 weeks.    Please call if you have not received a notification of your results.  If asked to return to clinic please make an appointment 1 week after your procedure.  Call [phone].

## 2022-03-30 NOTE — LETTER
April 4, 2022      Messi Gomez  [id number] [address] [facility] [address]        Dear ,    We are writing to inform you of your test results.    These polyps are benign, but a repeat exam in 5 yrs is suggested.      Resulted Orders   Surgical Pathology Exam   Result Value Ref Range    Case Report       Surgical Pathology Report                         Case: [id number]                                  Authorizing Provider:  Drew Zimmer MD        Collected:           03/30/2022 02:15 PM          Ordering Location:     [facility]          Received:            03/30/2022 02:53 PM                                 [facility]                                                          Pathologist:           Cheryle Acevedo MD PhD                                                      Specimen:    Large Intestine, Colon, Transverse                                                         Final Diagnosis       A(1). Colon, Transverse, polyps x2, polypectomy:  -Sessile serrated adenomas, fragments  -Negative for conventional dysplasia and malignancy        Clinical Information       Procedure:  COLONOSCOPY, WITH POLYPECTOMY AND BIOPSY  Pre-op Diagnosis: Family history of colon cancer [Z80.0]  Special screening for malignant neoplasms, colon [Z12.11]  Family history of malignant neoplasm of gastrointestinal tract [Z80.0]  Post-op Diagnosis: Z80.0 - Family history of colon cancer [ICD-10-CM]  Z12.11 - Special screening for malignant neoplasms, colon [ICD-10-CM]  Z80.0 - Family history of malignant neoplasm of gastrointestinal tract [ICD-10-CM]      Gross Description       A(1). Large Intestine, Colon, Transverse, :  The specimen is received in formalin, labeled with the patient's name, medical record number and other identifying information and designated  transverse colon polyps . It consists of multiple tan soft tissue fragments ranging from 0.2-0.8 cm in greatest dimension. Entirely submitted in one  cassette.  (YAHIR Velasco ASCP CM)        Microscopic Description       Microscopic examination was performed.      Performing Labs       The technical component of this testing was completed at [facility]      Case Images         If you have any questions or concerns, please call the clinic at the number listed above.       Sincerely,      Drew Zimmer MD

## 2022-04-04 PROBLEM — Z86.0100 HISTORY OF COLONIC POLYPS: Status: ACTIVE | Noted: 2022-04-04

## 2022-04-04 LAB
PATH REPORT.COMMENTS IMP SPEC: NORMAL
PATH REPORT.COMMENTS IMP SPEC: NORMAL
PATH REPORT.FINAL DX SPEC: NORMAL
PATH REPORT.GROSS SPEC: NORMAL
PATH REPORT.MICROSCOPIC SPEC OTHER STN: NORMAL
PATH REPORT.RELEVANT HX SPEC: NORMAL
PHOTO IMAGE: NORMAL

## 2022-10-09 ENCOUNTER — HEALTH MAINTENANCE LETTER (OUTPATIENT)
Age: 42
End: 2022-10-09

## 2022-12-01 ENCOUNTER — TRANSFERRED RECORDS (OUTPATIENT)
Dept: HEALTH INFORMATION MANAGEMENT | Facility: CLINIC | Age: 42
End: 2022-12-01

## 2022-12-15 ENCOUNTER — NURSE TRIAGE (OUTPATIENT)
Dept: FAMILY MEDICINE | Facility: OTHER | Age: 42
End: 2022-12-15

## 2022-12-15 NOTE — TELEPHONE ENCOUNTER
"SITUATION:   Calling about depression. Patient is tearful during conversation.     BACKGROUND:   Patient states that life is pilling up. Today is bad for the patient. Today is the patient's birthday. Patient feels he needs to get back on antidepressants. Patient does have suicidal thoughts. He has never thought he could carry anything out and does not have a current plan.     Patient talks about some stressful situations that have recently came up. 1. One of the children had a medical episode and they almost . They are doing better but things have not calmed down yet. 2. His dog bite on of the children.     He lives with his girlfriend and has her children half of the time. He feels safe in his environment.     He would like to get back on something for his depression. Previously has been on Wellbutrin, Citalopram, Effexor and they did not work for the patient. (years 3528-4509).    NURSE RECOMMENDATION:   Discussed calling the following numbers:   1. Mental Health Crisis Hotline: [phone]   2. Crisis Intervention: [phone] or [phone].   3. Text \" Hello\" to 575912    Discussed going to the ER if symptoms worsen.   Explained there are always nurses on call through the organization regardless of time of day.     We talked about things he could do to distract his mind like watching a movie or reading a book.     Talked about him getting a therapist. He is open to this idea.     PLAN:   Scheduled with Alvin.     Next 5 appointments (look out 90 days)    Dec 16, 2022 10:00 AM  (Arrive by 9:40 AM)  Provider Visit with Alvin Barrett PA-C  [facility] ([facility] ) [address]  [place] [id number] [phone]           RECOMMENDED DISPOSITION:    Will comply with recommendation: yes   If further questions/concerns or if Sx do not improve, worsen or new Sx develop, call your PCP or [facility] as soon as possible.    NOTES:  " Disposition was determined by the first positive assessment question, therefore all previous assessment questions were negative.       JULIA Chester, RN, PHN  [facility]  December 15, 2022    Reason for Disposition    Symptoms interfere with work or school    Additional Information    Negative: Patient attempted suicide    Negative: Patient is threatening suicide now    Negative: Violent behavior, or threatening to physically hurt or kill someone    Negative: Patient is very confused (disoriented, slurred speech) and no other adult (e.g., friend or family member) available    Negative: Difficult to awaken or acting very confused (disoriented, slurred speech) and new-onset    Negative: Sounds like a life-threatening emergency to the triager    Negative: Suicide thoughts, threats, attempts, or questions    Negative: Questions or concerns about alcohol use, unhealthy alcohol use, binge drinking, intoxication, or withdrawal    Negative: Questions or concerns about substance use (drug use), unhealthy drug use, intoxication, or withdrawal    Negative: Anxiety is main problem or symptom    Negative: Depression and unable to do any of normal activities (e.g., self care, school, work; in comparison to baseline).    Negative: Very strange or confused behavior    Negative: Patient sounds very sick or weak to the triager    Negative: Fever > 101 F  (38.3 C)    Negative: Sometimes has thoughts of suicide    Protocols used: DEPRESSION-A-OH

## 2022-12-16 ENCOUNTER — OFFICE VISIT (OUTPATIENT)
Dept: FAMILY MEDICINE | Facility: OTHER | Age: 42
End: 2022-12-16
Payer: COMMERCIAL

## 2022-12-16 VITALS
TEMPERATURE: 98.9 F | DIASTOLIC BLOOD PRESSURE: 104 MMHG | HEIGHT: 71 IN | OXYGEN SATURATION: 96 % | RESPIRATION RATE: 14 BRPM | BODY MASS INDEX: 44.1 KG/M2 | WEIGHT: 315 LBS | SYSTOLIC BLOOD PRESSURE: 138 MMHG | HEART RATE: 86 BPM

## 2022-12-16 DIAGNOSIS — M51.379 DEGENERATION OF LUMBAR OR LUMBOSACRAL INTERVERTEBRAL DISC: ICD-10-CM

## 2022-12-16 DIAGNOSIS — J45.20 MILD INTERMITTENT ASTHMA WITHOUT COMPLICATION: ICD-10-CM

## 2022-12-16 DIAGNOSIS — Z79.899 ENCOUNTER FOR LONG-TERM (CURRENT) USE OF MEDICATIONS: ICD-10-CM

## 2022-12-16 DIAGNOSIS — F32.1 MAJOR DEPRESSIVE DISORDER, SINGLE EPISODE, MODERATE (H): Primary | ICD-10-CM

## 2022-12-16 DIAGNOSIS — I10 HTN, GOAL BELOW 140/80: ICD-10-CM

## 2022-12-16 DIAGNOSIS — F41.1 GENERALIZED ANXIETY DISORDER: ICD-10-CM

## 2022-12-16 DIAGNOSIS — E78.5 HYPERLIPIDEMIA LDL GOAL <130: ICD-10-CM

## 2022-12-16 DIAGNOSIS — G47.33 OSA (OBSTRUCTIVE SLEEP APNEA): ICD-10-CM

## 2022-12-16 DIAGNOSIS — F43.9 STRESS AT HOME: ICD-10-CM

## 2022-12-16 LAB
ALBUMIN SERPL-MCNC: 3.9 G/DL (ref 3.4–5)
ALP SERPL-CCNC: 98 U/L (ref 40–150)
ALT SERPL W P-5'-P-CCNC: 34 U/L (ref 0–70)
ANION GAP SERPL CALCULATED.3IONS-SCNC: 6 MMOL/L (ref 3–14)
AST SERPL W P-5'-P-CCNC: 14 U/L (ref 0–45)
BILIRUB SERPL-MCNC: 0.5 MG/DL (ref 0.2–1.3)
BUN SERPL-MCNC: 17 MG/DL (ref 7–30)
CALCIUM SERPL-MCNC: 8.4 MG/DL (ref 8.5–10.1)
CHLORIDE BLD-SCNC: 110 MMOL/L (ref 94–109)
CHOLEST SERPL-MCNC: 225 MG/DL
CO2 SERPL-SCNC: 24 MMOL/L (ref 20–32)
CREAT SERPL-MCNC: 0.63 MG/DL (ref 0.66–1.25)
ERYTHROCYTE [DISTWIDTH] IN BLOOD BY AUTOMATED COUNT: 12.7 % (ref 10–15)
FASTING STATUS PATIENT QL REPORTED: YES
GFR SERPL CREATININE-BSD FRML MDRD: >90 ML/MIN/1.73M2
GLUCOSE BLD-MCNC: 113 MG/DL (ref 70–99)
HCT VFR BLD AUTO: 43.6 % (ref 40–53)
HDLC SERPL-MCNC: 36 MG/DL
HGB BLD-MCNC: 14.6 G/DL (ref 13.3–17.7)
LDLC SERPL CALC-MCNC: 163 MG/DL
MCH RBC QN AUTO: 30.5 PG (ref 26.5–33)
MCHC RBC AUTO-ENTMCNC: 33.5 G/DL (ref 31.5–36.5)
MCV RBC AUTO: 91 FL (ref 78–100)
NONHDLC SERPL-MCNC: 189 MG/DL
PLATELET # BLD AUTO: 263 10E3/UL (ref 150–450)
POTASSIUM BLD-SCNC: 3.9 MMOL/L (ref 3.4–5.3)
PROT SERPL-MCNC: 7.6 G/DL (ref 6.8–8.8)
RBC # BLD AUTO: 4.78 10E6/UL (ref 4.4–5.9)
SODIUM SERPL-SCNC: 140 MMOL/L (ref 133–144)
TRIGL SERPL-MCNC: 131 MG/DL
TSH SERPL DL<=0.005 MIU/L-ACNC: 1.26 MU/L (ref 0.4–4)
WBC # BLD AUTO: 5.3 10E3/UL (ref 4–11)

## 2022-12-16 PROCEDURE — 99214 OFFICE O/P EST MOD 30 MIN: CPT | Performed by: PHYSICIAN ASSISTANT

## 2022-12-16 PROCEDURE — 85027 COMPLETE CBC AUTOMATED: CPT | Performed by: PHYSICIAN ASSISTANT

## 2022-12-16 PROCEDURE — 80053 COMPREHEN METABOLIC PANEL: CPT | Performed by: PHYSICIAN ASSISTANT

## 2022-12-16 PROCEDURE — 80061 LIPID PANEL: CPT | Performed by: PHYSICIAN ASSISTANT

## 2022-12-16 PROCEDURE — 84443 ASSAY THYROID STIM HORMONE: CPT | Performed by: PHYSICIAN ASSISTANT

## 2022-12-16 PROCEDURE — 36415 COLL VENOUS BLD VENIPUNCTURE: CPT | Performed by: PHYSICIAN ASSISTANT

## 2022-12-16 RX ORDER — ATORVASTATIN CALCIUM 10 MG/1
10 TABLET, FILM COATED ORAL DAILY
Qty: 90 TABLET | Refills: 3 | Status: SHIPPED | OUTPATIENT
Start: 2022-12-16 | End: 2023-01-12

## 2022-12-16 RX ORDER — MELOXICAM 15 MG/1
15 TABLET ORAL DAILY
Qty: 90 TABLET | Refills: 1 | Status: SHIPPED | OUTPATIENT
Start: 2022-12-16 | End: 2023-01-12

## 2022-12-16 RX ORDER — BUPROPION HYDROCHLORIDE 150 MG/1
150 TABLET ORAL EVERY MORNING
Qty: 90 TABLET | Refills: 3 | Status: SHIPPED | OUTPATIENT
Start: 2022-12-16 | End: 2023-01-12

## 2022-12-16 RX ORDER — LOSARTAN POTASSIUM AND HYDROCHLOROTHIAZIDE 12.5; 5 MG/1; MG/1
1 TABLET ORAL DAILY
Qty: 90 TABLET | Refills: 3 | Status: SHIPPED | OUTPATIENT
Start: 2022-12-16 | End: 2023-01-12

## 2022-12-16 RX ORDER — CYCLOBENZAPRINE HCL 10 MG
10 TABLET ORAL 3 TIMES DAILY PRN
Qty: 30 TABLET | Refills: 0 | Status: SHIPPED | OUTPATIENT
Start: 2022-12-16 | End: 2022-12-26

## 2022-12-16 RX ORDER — TAMSULOSIN HYDROCHLORIDE 0.4 MG/1
0.4 CAPSULE ORAL DAILY
Qty: 90 CAPSULE | Refills: 3 | Status: SHIPPED | OUTPATIENT
Start: 2022-12-16 | End: 2023-01-12

## 2022-12-16 ASSESSMENT — ASTHMA QUESTIONNAIRES
QUESTION_2 LAST FOUR WEEKS HOW OFTEN HAVE YOU HAD SHORTNESS OF BREATH: ONCE OR TWICE A WEEK
ACT_TOTALSCORE: 22
ACT_TOTALSCORE: 22
QUESTION_3 LAST FOUR WEEKS HOW OFTEN DID YOUR ASTHMA SYMPTOMS (WHEEZING, COUGHING, SHORTNESS OF BREATH, CHEST TIGHTNESS OR PAIN) WAKE YOU UP AT NIGHT OR EARLIER THAN USUAL IN THE MORNING: NOT AT ALL
QUESTION_4 LAST FOUR WEEKS HOW OFTEN HAVE YOU USED YOUR RESCUE INHALER OR NEBULIZER MEDICATION (SUCH AS ALBUTEROL): ONCE A WEEK OR LESS
QUESTION_1 LAST FOUR WEEKS HOW MUCH OF THE TIME DID YOUR ASTHMA KEEP YOU FROM GETTING AS MUCH DONE AT WORK, SCHOOL OR AT HOME: NONE OF THE TIME
QUESTION_5 LAST FOUR WEEKS HOW WOULD YOU RATE YOUR ASTHMA CONTROL: WELL CONTROLLED

## 2022-12-16 ASSESSMENT — PATIENT HEALTH QUESTIONNAIRE - PHQ9
SUM OF ALL RESPONSES TO PHQ QUESTIONS 1-9: 13
SUM OF ALL RESPONSES TO PHQ QUESTIONS 1-9: 13
10. IF YOU CHECKED OFF ANY PROBLEMS, HOW DIFFICULT HAVE THESE PROBLEMS MADE IT FOR YOU TO DO YOUR WORK, TAKE CARE OF THINGS AT HOME, OR GET ALONG WITH OTHER PEOPLE: SOMEWHAT DIFFICULT

## 2022-12-16 ASSESSMENT — PAIN SCALES - GENERAL: PAINLEVEL: MODERATE PAIN (4)

## 2022-12-16 NOTE — PROGRESS NOTES
Patient reports new medications or medications changes. Provider please review.     Assessment & Plan     HTN, goal below 140/80  Poor control as he has been out of his medications for a while.  Start medications and follow-up in 3 to 4 weeks.  - losartan-hydrochlorothiazide (HYZAAR) 50-12.5 MG tablet; Take 1 tablet by mouth daily  - tamsulosin (FLOMAX) 0.4 MG capsule; Take 1 capsule (0.4 mg) by mouth daily  - CBC with platelets; Future  - Comprehensive metabolic panel (BMP + Alb, Alk Phos, ALT, AST, Total. Bili, TP); Future  - Lipid panel reflex to direct LDL Fasting; Future  - Lipid panel reflex to direct LDL Fasting  - Comprehensive metabolic panel (BMP + Alb, Alk Phos, ALT, AST, Total. Bili, TP)  - CBC with platelets  - TSH with free T4 reflex    Major depressive disorder, single episode, moderate (H)  Reemergence of this problem.  Advise counseling and medications as noted below and follow-up in 3 to 4 weeks.  - Adult Mental Health  Referral; Future  - buPROPion (WELLBUTRIN XL) 150 MG 24 hr tablet; Take 1 tablet (150 mg) by mouth every morning  - TSH with free T4 reflex    Generalized anxiety disorder  See above  - TSH with free T4 reflex    Stress at home  His wife had an episode of encephalopathy of uncertain etiology and spent some time in the hospital.  This is caused some relational issues with her ex-.  Making his depression worse.  Follow-up as needed.    Hyperlipidemia LDL goal <130  - atorvastatin (LIPITOR) 10 MG tablet; Take 1 tablet (10 mg) by mouth daily  - Comprehensive metabolic panel (BMP + Alb, Alk Phos, ALT, AST, Total. Bili, TP); Future  - Lipid panel reflex to direct LDL Fasting; Future  - Lipid panel reflex to direct LDL Fasting  - Comprehensive metabolic panel (BMP + Alb, Alk Phos, ALT, AST, Total. Bili, TP)  - TSH with free T4 reflex    Encounter for long-term (current) use of medications    NASIR (obstructive sleep apnea)  Using his CPAP on a regular basis as previously  "directed.  - TSH with free T4 reflex    Mild intermittent asthma without complication  Doing well no new concerns only rarely uses his albuterol inhaler.  - TSH with free T4 reflex    Degeneration of lumbar or lumbosacral intervertebral disc  Ongoing lumbar back pain with some radiculopathy going down the left leg.  He is awaiting further evaluation with MRI and injection via a spine specialist.  - cyclobenzaprine (FLEXERIL) 10 MG tablet; Take 1 tablet (10 mg) by mouth 3 times daily as needed for muscle spasms ( careful use as this can cause fatigue)  - Physical Therapy Referral; Future  - meloxicam (MOBIC) 15 MG tablet; Take 1 tablet (15 mg) by mouth daily     BMI:   Estimated body mass index is 52.79 kg/m  as calculated from the following:    Height as of this encounter: 5' 10.67\" (1.795 m).    Weight as of this encounter: 375 lb (170.1 kg).   Weight management plan: Discussed healthy diet and exercise guidelines    Depression Screening Follow Up    PHQ 12/16/2022   PHQ-9 Total Score 13   Q9: Thoughts of better off dead/self-harm past 2 weeks Several days   F/U: Thoughts of suicide or self-harm Yes   F/U: Self harm-plan No   F/U: Self-harm action No   F/U: Safety concerns No     Last PHQ-9 12/16/2022   1.  Little interest or pleasure in doing things 2   2.  Feeling down, depressed, or hopeless 2   3.  Trouble falling or staying asleep, or sleeping too much 2   4.  Feeling tired or having little energy 2   5.  Poor appetite or overeating 2   6.  Feeling bad about yourself 1   7.  Trouble concentrating 1   8.  Moving slowly or restless 0   Q9: Thoughts of better off dead/self-harm past 2 weeks 1   PHQ-9 Total Score 13   Difficulty at work, home, or with people -   In the past two weeks have you had thoughts of suicide or self harm? Yes   Do you have concerns about your personal safety or the safety of others? No   In the past 2 weeks have you thought about a plan or had intention to harm yourself? No   In the past 2 " weeks have you acted on these thoughts in any way? No         Follow Up      Follow Up Actions Taken  Crisis resource information provided in the After Visit Summary  Mental Health Referral placed    Discussed the following ways the patient can remain in a safe environment:  remove drugs, dispose of old medications  and be around others  Work on weight loss  Regular exercise  Return in about 4 weeks (around 1/13/2023) for BP Recheck with provider, Medication Recheck, Mental Health.    Alvin Garcia PA-C  [facility] MERCEDES Swenson is a 42 year old, presenting for the following health issues:  Recheck Medication      History of Present Illness       Back Pain:  He presents for follow up of back pain. Patient's back pain is a chronic problem.  Location of back pain:  Left lower back, left buttock and left hip  Description of back pain: sharp, shooting and stabbing  Back pain spreads: left buttocks and left knee    Since patient first noticed back pain, pain is: always present, but gets better and worse  Does back pain interfere with his job:  Yes      Mental Health Follow-up:  Patient presents to follow-up on Depression.Patient's depression since last visit has been:  Worse  The patient is having other symptoms associated with depression.      Any significant life events: relationship concerns and financial concerns  Patient is not feeling anxious or having panic attacks.  Patient has no concerns about alcohol or drug use.    He eats 0-1 servings of fruits and vegetables daily.He consumes 1 sweetened beverage(s) daily.He exercises with enough effort to increase his heart rate 10 to 19 minutes per day.  He exercises with enough effort to increase his heart rate 3 or less days per week. He is missing 7 dose(s) of medications per week.    Today's PHQ-9         PHQ-9 Total Score: 13    PHQ-9 Q9 Thoughts of better off dead/self-harm past 2 weeks :   Several days  Thoughts of suicide or self  "harm: (P) Yes  Self-harm Plan:   (P) No  Self-harm Action:     (P) No  Safety concerns for self or others: (P) No    How difficult have these problems made it for you to do your work, take care of things at home, or get along with other people: Somewhat difficult       Hyperlipidemia Follow-Up      Are you regularly taking any medication or supplement to lower your cholesterol?   No: patient is out of Rx Atorvastatin     Are you having muscle aches or other side effects that you think could be caused by your cholesterol lowering medication?  No    Hypertension Follow-up      Do you check your blood pressure regularly outside of the clinic? No     Are you following a low salt diet? No    Are your blood pressures ever more than 140 on the top number (systolic) OR more   than 90 on the bottom number (diastolic), for example 140/90? NA        Review of Systems   Constitutional, HEENT, cardiovascular, pulmonary, GI, , musculoskeletal, neuro, skin, endocrine and psych systems are negative, except as otherwise noted.      Objective    BP (!) 138/104   Pulse 86   Temp 98.9  F (37.2  C) (Temporal)   Resp 14   Ht 5' 10.67\" (1.795 m)   Wt (!) 375 lb (170.1 kg)   SpO2 96%   BMI 52.79 kg/m    Body mass index is 52.79 kg/m .  Physical Exam   GENERAL: healthy, alert and no distress  NECK: no adenopathy, no asymmetry, masses, or scars and thyroid normal to palpation  RESP: lungs clear to auscultation - no rales, rhonchi or wheezes  CV: regular rate and rhythm, normal S1 S2, no S3 or S4, no murmur, click or rub, no peripheral edema and peripheral pulses strong  ABDOMEN: soft, nontender, no hepatosplenomegaly, no masses and bowel sounds normal  MS: no gross musculoskeletal defects noted, no edema  SKIN: no suspicious lesions or rashes to exposed visible skin today.  NEURO: Normal strength and tone, mentation intact and speech normal  PSYCH: affect flat, tearful, anxious and fatigued    No results found for this or any " previous visit (from the past 24 hour(s)).

## 2023-01-11 ASSESSMENT — PATIENT HEALTH QUESTIONNAIRE - PHQ9
10. IF YOU CHECKED OFF ANY PROBLEMS, HOW DIFFICULT HAVE THESE PROBLEMS MADE IT FOR YOU TO DO YOUR WORK, TAKE CARE OF THINGS AT HOME, OR GET ALONG WITH OTHER PEOPLE: SOMEWHAT DIFFICULT
SUM OF ALL RESPONSES TO PHQ QUESTIONS 1-9: 5
SUM OF ALL RESPONSES TO PHQ QUESTIONS 1-9: 5

## 2023-01-11 ASSESSMENT — ANXIETY QUESTIONNAIRES
1. FEELING NERVOUS, ANXIOUS, OR ON EDGE: NOT AT ALL
6. BECOMING EASILY ANNOYED OR IRRITABLE: SEVERAL DAYS
GAD7 TOTAL SCORE: 2
GAD7 TOTAL SCORE: 2
IF YOU CHECKED OFF ANY PROBLEMS ON THIS QUESTIONNAIRE, HOW DIFFICULT HAVE THESE PROBLEMS MADE IT FOR YOU TO DO YOUR WORK, TAKE CARE OF THINGS AT HOME, OR GET ALONG WITH OTHER PEOPLE: NOT DIFFICULT AT ALL
7. FEELING AFRAID AS IF SOMETHING AWFUL MIGHT HAPPEN: NOT AT ALL
2. NOT BEING ABLE TO STOP OR CONTROL WORRYING: NOT AT ALL
8. IF YOU CHECKED OFF ANY PROBLEMS, HOW DIFFICULT HAVE THESE MADE IT FOR YOU TO DO YOUR WORK, TAKE CARE OF THINGS AT HOME, OR GET ALONG WITH OTHER PEOPLE?: NOT DIFFICULT AT ALL
4. TROUBLE RELAXING: SEVERAL DAYS
7. FEELING AFRAID AS IF SOMETHING AWFUL MIGHT HAPPEN: NOT AT ALL
3. WORRYING TOO MUCH ABOUT DIFFERENT THINGS: NOT AT ALL
5. BEING SO RESTLESS THAT IT IS HARD TO SIT STILL: NOT AT ALL
GAD7 TOTAL SCORE: 2

## 2023-01-12 ENCOUNTER — OFFICE VISIT (OUTPATIENT)
Dept: FAMILY MEDICINE | Facility: OTHER | Age: 43
End: 2023-01-12
Payer: COMMERCIAL

## 2023-01-12 VITALS
TEMPERATURE: 98.5 F | DIASTOLIC BLOOD PRESSURE: 70 MMHG | HEIGHT: 70 IN | WEIGHT: 315 LBS | SYSTOLIC BLOOD PRESSURE: 122 MMHG | HEART RATE: 120 BPM | BODY MASS INDEX: 45.1 KG/M2 | OXYGEN SATURATION: 94 %

## 2023-01-12 DIAGNOSIS — Z79.899 ENCOUNTER FOR LONG-TERM (CURRENT) USE OF MEDICATIONS: Primary | ICD-10-CM

## 2023-01-12 DIAGNOSIS — E78.5 HYPERLIPIDEMIA LDL GOAL <130: ICD-10-CM

## 2023-01-12 DIAGNOSIS — J45.20 MILD INTERMITTENT ASTHMA WITHOUT COMPLICATION: ICD-10-CM

## 2023-01-12 DIAGNOSIS — M51.379 DEGENERATION OF LUMBAR OR LUMBOSACRAL INTERVERTEBRAL DISC: ICD-10-CM

## 2023-01-12 DIAGNOSIS — I10 HTN, GOAL BELOW 140/80: ICD-10-CM

## 2023-01-12 DIAGNOSIS — Z23 HIGH PRIORITY FOR 2019-NCOV VACCINE: ICD-10-CM

## 2023-01-12 DIAGNOSIS — K21.00 GASTROESOPHAGEAL REFLUX DISEASE WITH ESOPHAGITIS, UNSPECIFIED WHETHER HEMORRHAGE: ICD-10-CM

## 2023-01-12 DIAGNOSIS — F32.1 MAJOR DEPRESSIVE DISORDER, SINGLE EPISODE, MODERATE (H): ICD-10-CM

## 2023-01-12 PROCEDURE — 90471 IMMUNIZATION ADMIN: CPT | Performed by: PHYSICIAN ASSISTANT

## 2023-01-12 PROCEDURE — 90686 IIV4 VACC NO PRSV 0.5 ML IM: CPT | Performed by: PHYSICIAN ASSISTANT

## 2023-01-12 PROCEDURE — 0134A COVID-19 VACCINE BIVALENT BOOSTER 18+ (MODERNA): CPT | Performed by: PHYSICIAN ASSISTANT

## 2023-01-12 PROCEDURE — 91313 COVID-19 VACCINE BIVALENT BOOSTER 18+ (MODERNA): CPT | Performed by: PHYSICIAN ASSISTANT

## 2023-01-12 PROCEDURE — 96127 BRIEF EMOTIONAL/BEHAV ASSMT: CPT | Mod: 59 | Performed by: PHYSICIAN ASSISTANT

## 2023-01-12 PROCEDURE — 99214 OFFICE O/P EST MOD 30 MIN: CPT | Mod: 25 | Performed by: PHYSICIAN ASSISTANT

## 2023-01-12 RX ORDER — TAMSULOSIN HYDROCHLORIDE 0.4 MG/1
0.4 CAPSULE ORAL DAILY
Qty: 90 CAPSULE | Refills: 3 | Status: SHIPPED | OUTPATIENT
Start: 2023-01-12 | End: 2023-12-22

## 2023-01-12 RX ORDER — LOSARTAN POTASSIUM AND HYDROCHLOROTHIAZIDE 12.5; 5 MG/1; MG/1
1 TABLET ORAL DAILY
Qty: 90 TABLET | Refills: 3 | Status: SHIPPED | OUTPATIENT
Start: 2023-01-12 | End: 2023-12-22

## 2023-01-12 RX ORDER — ALBUTEROL SULFATE 90 UG/1
2 AEROSOL, METERED RESPIRATORY (INHALATION) EVERY 6 HOURS
Qty: 18 G | Refills: 11 | Status: SHIPPED | OUTPATIENT
Start: 2023-01-12 | End: 2023-07-18

## 2023-01-12 RX ORDER — BUPROPION HYDROCHLORIDE 150 MG/1
150 TABLET ORAL EVERY MORNING
Qty: 90 TABLET | Refills: 3 | Status: SHIPPED | OUTPATIENT
Start: 2023-01-12 | End: 2023-12-22

## 2023-01-12 RX ORDER — MELOXICAM 15 MG/1
15 TABLET ORAL DAILY
Qty: 90 TABLET | Refills: 1 | Status: SHIPPED | OUTPATIENT
Start: 2023-01-12 | End: 2023-07-21

## 2023-01-12 RX ORDER — ATORVASTATIN CALCIUM 10 MG/1
10 TABLET, FILM COATED ORAL DAILY
Qty: 90 TABLET | Refills: 3 | Status: SHIPPED | OUTPATIENT
Start: 2023-01-12 | End: 2023-12-22

## 2023-01-12 RX ORDER — OMEPRAZOLE 40 MG/1
40 CAPSULE, DELAYED RELEASE ORAL DAILY
Qty: 90 CAPSULE | Refills: 3 | Status: SHIPPED | OUTPATIENT
Start: 2023-01-12 | End: 2023-12-22

## 2023-01-12 ASSESSMENT — PAIN SCALES - GENERAL: PAINLEVEL: MILD PAIN (3)

## 2023-01-12 ASSESSMENT — PATIENT HEALTH QUESTIONNAIRE - PHQ9
SUM OF ALL RESPONSES TO PHQ QUESTIONS 1-9: 5
10. IF YOU CHECKED OFF ANY PROBLEMS, HOW DIFFICULT HAVE THESE PROBLEMS MADE IT FOR YOU TO DO YOUR WORK, TAKE CARE OF THINGS AT HOME, OR GET ALONG WITH OTHER PEOPLE: SOMEWHAT DIFFICULT

## 2023-01-12 ASSESSMENT — ANXIETY QUESTIONNAIRES: GAD7 TOTAL SCORE: 2

## 2023-01-12 NOTE — PROGRESS NOTES
Assessment & Plan     Degeneration of lumbar or lumbosacral intervertebral disc  Patient has been doing very well with meloxicam for about a month.  No further need for other medications at this point in time.  He is not using ibuprofen at all.  He states that this is helping with his aches and pains.  Advised that he have lab work related to kidney liver function at the beginning of March to monitor for this and then follow-up with me as needed after that.  - meloxicam (MOBIC) 15 MG tablet; Take 1 tablet (15 mg) by mouth daily    HTN, goal below 140/80  Good control of this at this point time refilled medications follow-up in 6 months  - losartan-hydrochlorothiazide (HYZAAR) 50-12.5 MG tablet; Take 1 tablet by mouth daily  - tamsulosin (FLOMAX) 0.4 MG capsule; Take 1 capsule (0.4 mg) by mouth daily    Major depressive disorder, single episode, moderate (H)  Control this at this point time and with the addition of treatment for body aches and pains he states that he is doing very well.  Refill medications to his mail order pharmacy and follow-up in 6 months.  - buPROPion (WELLBUTRIN XL) 150 MG 24 hr tablet; Take 1 tablet (150 mg) by mouth every morning    Hyperlipidemia LDL goal <130  No new concerns with respect to this.  Follow-up labs in 6 months.  - atorvastatin (LIPITOR) 10 MG tablet; Take 1 tablet (10 mg) by mouth daily    Mild intermittent asthma without complication  Doing well with respect to breathing and medication use.  Refilled medications.  Follow-up in 6 months.  - albuterol (PROAIR HFA/PROVENTIL HFA/VENTOLIN HFA) 108 (90 Base) MCG/ACT inhaler; Inhale 2 puffs into the lungs every 6 hours    Gastroesophageal reflux disease with esophagitis, unspecified whether hemorrhage  Has been using this to combat gastroesophageal reflux disease with good results.  Follow-up as needed.  - omeprazole (PRILOSEC) 40 MG DR capsule; Take 1 capsule (40 mg) by mouth daily Reported on 2/13/2017    High priority for  2019-nCoV vaccine  Due for immunization  - COVID-19,PF,MODERNA BIVALENT 18+Yrs    Encounter for long-term (current) use of medications     Work on weight loss  Regular exercise  No follow-ups on file.    JOHN PAUL [facility] MINI Swenson is a 42 year old, presenting for the following health issues:  Depression      History of Present Illness     Asthma:  He presents for follow up of asthma.  He has no cough, no wheezing, and no shortness of breath. He is using a relief medication a few times a month. He does not have a controller medication. Patient is aware of the following triggers: exercise or sports and upper respiratory infections. The patient has not had a visit to the Emergency Room, Urgent Care or Hospital due to asthma since the last clinic visit.     Back Pain:  He presents for follow up of back pain. Patient's back pain is a chronic problem.  Location of back pain:  Right lower back, left lower back, left middle of back and left hip  Description of back pain: burning, dull ache, sharp, shooting and stabbing  Back pain spreads: left buttocks, left thigh and left knee    Since patient first noticed back pain, pain is: always present, but gets better and worse  Does back pain interfere with his job:  Yes      Mental Health Follow-up:  Patient presents to follow-up on Depression & Anxiety.Patient's depression since last visit has been:  Good  The patient is not having other symptoms associated with depression.  Patient's anxiety since last visit has been:  Good  The patient is not having other symptoms associated with anxiety.  Any significant life events: other  Patient is not feeling anxious or having panic attacks.  Patient has no concerns about alcohol or drug use.    Hypertension: He presents for follow up of hypertension.  He does not check blood pressure  regularly outside of the clinic. Outpatient blood pressures have not been over 140/90. He does not follow a  "low salt diet.      Today's PHQ-9         PHQ-9 Total Score: 5    PHQ-9 Q9 Thoughts of better off dead/self-harm past 2 weeks :   Not at all    How difficult have these problems made it for you to do your work, take care of things at home, or get along with other people: Somewhat difficult  Today's MARISOL-7 Score: 2             Review of Systems   Constitutional, HEENT, cardiovascular, pulmonary, GI, , musculoskeletal, neuro, skin, endocrine and psych systems are negative, except as otherwise noted.      Objective    /70 (BP Location: Right arm, Patient Position: Sitting, Cuff Size: Adult Regular)   Pulse 120   Temp 98.5  F (36.9  C) (Temporal)   Ht 1.79 m (5' 10.47\")   Wt (!) 167.8 kg (370 lb)   SpO2 94%   BMI 52.38 kg/m    Body mass index is 52.38 kg/m .  Physical Exam   GENERAL: healthy, alert and no distress  NECK: no adenopathy, no asymmetry, masses, or scars and thyroid normal to palpation  RESP: lungs clear to auscultation - no rales, rhonchi or wheezes  CV: regular rate and rhythm, normal S1 S2, no S3 or S4, no murmur, click or rub, no peripheral edema and peripheral pulses strong  ABDOMEN: soft, nontender, no hepatosplenomegaly, no masses and bowel sounds normal  MS: no gross musculoskeletal defects noted, no edema  SKIN: no suspicious lesions or rashes to exposed visible skin today.  NEURO: Normal strength and tone, mentation intact and speech normal  PSYCH: mentation appears normal, affect normal/bright    No results found for this or any previous visit (from the past 24 hour(s)).              "

## 2023-03-09 ENCOUNTER — NURSE TRIAGE (OUTPATIENT)
Dept: FAMILY MEDICINE | Facility: OTHER | Age: 43
End: 2023-03-09

## 2023-03-09 ENCOUNTER — OFFICE VISIT (OUTPATIENT)
Dept: FAMILY MEDICINE | Facility: OTHER | Age: 43
End: 2023-03-09
Payer: COMMERCIAL

## 2023-03-09 VITALS
BODY MASS INDEX: 45.1 KG/M2 | SYSTOLIC BLOOD PRESSURE: 144 MMHG | HEART RATE: 83 BPM | TEMPERATURE: 98.2 F | DIASTOLIC BLOOD PRESSURE: 98 MMHG | OXYGEN SATURATION: 98 % | WEIGHT: 315 LBS | RESPIRATION RATE: 24 BRPM | HEIGHT: 70 IN

## 2023-03-09 DIAGNOSIS — G89.29 CHRONIC LOW BACK PAIN WITH SCIATICA, SCIATICA LATERALITY UNSPECIFIED, UNSPECIFIED BACK PAIN LATERALITY: Primary | ICD-10-CM

## 2023-03-09 DIAGNOSIS — M54.40 CHRONIC LOW BACK PAIN WITH SCIATICA, SCIATICA LATERALITY UNSPECIFIED, UNSPECIFIED BACK PAIN LATERALITY: Primary | ICD-10-CM

## 2023-03-09 DIAGNOSIS — I47.10 SVT (SUPRAVENTRICULAR TACHYCARDIA) (H): ICD-10-CM

## 2023-03-09 DIAGNOSIS — M51.379 DEGENERATION OF LUMBAR OR LUMBOSACRAL INTERVERTEBRAL DISC: ICD-10-CM

## 2023-03-09 DIAGNOSIS — M54.40 CHRONIC LOW BACK PAIN WITH SCIATICA, SCIATICA LATERALITY UNSPECIFIED, UNSPECIFIED BACK PAIN LATERALITY: ICD-10-CM

## 2023-03-09 DIAGNOSIS — M54.16 SUBACUTE LEFT LUMBAR RADICULOPATHY: ICD-10-CM

## 2023-03-09 DIAGNOSIS — E66.01 MORBID OBESITY (H): ICD-10-CM

## 2023-03-09 DIAGNOSIS — G89.29 CHRONIC LOW BACK PAIN WITH SCIATICA, SCIATICA LATERALITY UNSPECIFIED, UNSPECIFIED BACK PAIN LATERALITY: ICD-10-CM

## 2023-03-09 PROCEDURE — 99213 OFFICE O/P EST LOW 20 MIN: CPT | Performed by: PHYSICIAN ASSISTANT

## 2023-03-09 RX ORDER — CYCLOBENZAPRINE HCL 10 MG
10 TABLET ORAL 3 TIMES DAILY PRN
Qty: 30 TABLET | Refills: 0 | Status: SHIPPED | OUTPATIENT
Start: 2023-03-09 | End: 2023-03-20

## 2023-03-09 RX ORDER — ONDANSETRON 8 MG/1
8 TABLET, ORALLY DISINTEGRATING ORAL EVERY 8 HOURS PRN
Qty: 20 TABLET | Refills: 0 | Status: SHIPPED | OUTPATIENT
Start: 2023-03-09 | End: 2023-04-13

## 2023-03-09 RX ORDER — OXYCODONE HYDROCHLORIDE 5 MG/1
5-10 TABLET ORAL EVERY 6 HOURS PRN
Qty: 30 TABLET | Refills: 0 | Status: SHIPPED | OUTPATIENT
Start: 2023-03-09 | End: 2023-03-09

## 2023-03-09 RX ORDER — OXYCODONE HYDROCHLORIDE 5 MG/1
5-10 TABLET ORAL EVERY 6 HOURS PRN
Qty: 30 TABLET | Refills: 0 | Status: SHIPPED | OUTPATIENT
Start: 2023-03-09 | End: 2023-03-20

## 2023-03-09 ASSESSMENT — PAIN SCALES - GENERAL: PAINLEVEL: SEVERE PAIN (7)

## 2023-03-09 ASSESSMENT — ENCOUNTER SYMPTOMS: BACK PAIN: 1

## 2023-03-09 NOTE — PROGRESS NOTES
Assessment & Plan     Chronic low back pain with sciatica, sciatica laterality unspecified, unspecified back pain laterality  Degeneration of lumbar or lumbosacral intervertebral disc  Subacute left lumbar radiculopathy  Patient presents the clinic today in tears because of left lumbar radiculopathy.  He has an injection planned for next week Wednesday but needs to fill the gap between now and then with some pain control.  He has had Flexeril fairly successfully in the past as well as oxycodone by remote review of his medical record.  Advised these medications as directed.  Did give him some Zofran because sometimes he gets nauseated from the oxycodone though it does work well for pain.  Follow-up with me as needed.  - cyclobenzaprine (FLEXERIL) 10 MG tablet; Take 1 tablet (10 mg) by mouth 3 times daily as needed for muscle spasms ( careful use as this can cause fatigue)  - oxyCODONE (ROXICODONE) 5 MG tablet; Take 1-2 tablets (5-10 mg) by mouth every 6 hours as needed for pain  - ondansetron (ZOFRAN ODT) 8 MG ODT tab; Take 1 tablet (8 mg) by mouth every 8 hours as needed for nausea    SVT (supraventricular tachycardia) (H)  Morbid obesity (H)  Historically noted.  Obesity still a problem.  He has not had any palpitations recently that he knows of but then he does say that he is distracted by the pain from his back.  Follow-up with me as needed.  Blood pressure is noted to be elevated today in response to his pain.     No follow-ups on file.    JOHN PAUL [facility]    Darren Swenson is a 42 year old, presenting for the following health issues:  Back Pain      Back Pain     History of Present Illness       Back Pain:  He presents for follow up of back pain. Patient's back pain is a recurring problem.  Location of back pain:  Left lower back  Description of back pain: burning, dull ache, sharp, shooting and stabbing  Back pain spreads: left buttocks, left thigh, left knee and left  "foot    Since patient first noticed back pain, pain is: always present, but gets better and worse  Does back pain interfere with his job:  Yes      He eats 0-1 servings of fruits and vegetables daily.He consumes 1 sweetened beverage(s) daily.He exercises with enough effort to increase his heart rate 10 to 19 minutes per day.  He exercises with enough effort to increase his heart rate 4 days per week.   He is taking medications regularly.       Pain History:   When did you first notice your pain? - Chronic Pain   Have you seen this provider for your pain in the past?   Yes   Where in your body do you have pain? Low back  Are you seeing anyone else for your pain? No    PHQ-9 SCORE 3/3/2022 12/16/2022 1/11/2023   PHQ-9 Total Score - - -   PHQ-9 Total Score MyChart 1 (Minimal depression) 13 (Moderate depression) 5 (Mild depression)   PHQ-9 Total Score 1 13 5       MARISOL-7 SCORE 12/31/2020 3/3/2022 1/11/2023   Total Score - - -   Total Score 1 (minimal anxiety) 0 (minimal anxiety) 2 (minimal anxiety)   Total Score 1 0 2       Chronic Pain Follow Up:    Location of pain: Left lumbar and radiculopathy going down the left leg past the knee.  Analgesia/pain control:    - Recent changes: Increased   - Overall control: Inadequate pain control    - Current treatments: Nonsteroidal anti-inflammatories  Adherence:     - Do you ever take more pain medicine than prescribed? No    - When did you take your last dose of pain medicine?  None available  Adverse effects:    PDMP Review     None        Last CSA Agreement:   CSA -- Patient Level:    CSA: None found at the patient level.       Last UDS:     Review of Systems   Musculoskeletal: Positive for back pain.      Constitutional, HEENT, cardiovascular, pulmonary, GI, , musculoskeletal, neuro, skin, endocrine and psych systems are negative, except as otherwise noted.      Objective    BP (!) 138/98   Pulse 83   Temp 98.2  F (36.8  C) (Temporal)   Resp 24   Ht 1.79 m (5' 10.47\")  "  Wt (!) 166.7 kg (367 lb 8 oz)   SpO2 98%   BMI 52.03 kg/m    Body mass index is 52.03 kg/m .  Physical Exam   GENERAL: healthy, alert and no distress  RESP: lungs clear to auscultation - no rales, rhonchi or wheezes  CV: regular rate and rhythm, normal S1 S2, no S3 or S4, no murmur, click or rub, no peripheral edema and peripheral pulses strong  MUSCULAR: Patient stood at the bedside during the entire physical exam leaning heavily up on the bed to support his low back due to the pain that he is in.  He would not sit down for me and physical exam was incredibly limited to his musculoskeletal system.  Psychological:   Negative for memory, mood or flight of ideas at this time with tearful and painful affect.  Good recall of date, time and place.

## 2023-03-09 NOTE — TELEPHONE ENCOUNTER
Patient calling stating that the pharmacy received only the Zofran and Cyclobenzaprine and not the Oxycodone.   RN called pharmacy to verify.     Please resend over to pharmacy.     СВЕТЛАНА ChesterN, RN, PHN  [place]  March 9, 2023

## 2023-03-09 NOTE — TELEPHONE ENCOUNTER
Patient called reporting history of chronic back pain with an acute flare up of severe pain over the past few days. Patient was being seen at [facility], and they advised that he needed an injection but his insurance at the time was not going to cover the MRI needed in order to proceed with the injection.     Patient reports back pain has significantly increased over the past few days, patient audibly tearful on call. Patient reports he contacted [facility] and has an emergency appointment with [facility] on Wednesday 03/15/2023 at 8:30am in [place]. Patient is requesting pain medications or muscle relaxer in order to help give him some relief until he is able to get to that appointment next Wednesday. Informed patient appointment is needed in order for medications of this nature to be prescribed.     Triage disposition recommends to be seen in office today. Appointment scheduled:    Appointments in Next Year    Mar 09, 2023  3:30 PM  (Arrive by 3:10 PM)  Provider Visit with Alvin Barrett PA-C  [facility] ([facility] ) [phone]        JULIA Galeas, RN  [facility] ~ Registered Nurse  Clinic Triage ~ [place] [facility] [place]  March 9, 2023    Reason for Disposition    SEVERE back pain (e.g., excruciating, unable to do any normal activities) and not improved after pain medicine and CARE ADVICE    Additional Information    Negative: Passed out (i.e., fainted, collapsed and was not responding)    Negative: Shock suspected (e.g., cold/pale/clammy skin, too weak to stand, low BP, rapid pulse)    Negative: Sounds like a life-threatening emergency to the triager    Negative: Major injury to the back (e.g., MVA, fall > 10 feet or 3 meters, penetrating injury, etc.)    Negative: Pain in the upper back over the ribs (rib cage) that radiates (travels) into the chest    Negative: Pain in the upper back over the ribs (rib cage) and worsened by coughing (or clearly increases with  breathing)    Negative: Back pain during pregnancy    Negative: SEVERE back pain of sudden onset and age > 60 years    Negative: SEVERE abdominal pain (e.g., excruciating)    Negative: Abdominal pain and age > 60 years    Negative: Unable to urinate (or only a few drops) and bladder feels very full    Negative: Loss of bladder or bowel control (urine or bowel incontinence; wetting self, leaking stool) of new-onset    Negative: Numbness (loss of sensation) in groin or rectal area    Negative: Pain radiates into groin, scrotum    Negative: Blood in urine (red, pink, or tea-colored)    Negative: Vomiting and pain over lower ribs of back (i.e., flank - kidney area)    Negative: Weakness of a leg or foot (e.g., unable to bear weight, dragging foot)    Negative: Patient sounds very sick or weak to the triager    Negative: Fever > 100.4 F (38.0 C) and flank pain    Negative: Pain or burning with passing urine (urination)    Protocols used: BACK PAIN-A-OH

## 2023-03-20 DIAGNOSIS — M51.379 DEGENERATION OF LUMBAR OR LUMBOSACRAL INTERVERTEBRAL DISC: ICD-10-CM

## 2023-03-20 DIAGNOSIS — G89.29 CHRONIC LOW BACK PAIN WITH SCIATICA, SCIATICA LATERALITY UNSPECIFIED, UNSPECIFIED BACK PAIN LATERALITY: ICD-10-CM

## 2023-03-20 DIAGNOSIS — M54.40 CHRONIC LOW BACK PAIN WITH SCIATICA, SCIATICA LATERALITY UNSPECIFIED, UNSPECIFIED BACK PAIN LATERALITY: ICD-10-CM

## 2023-03-20 RX ORDER — OXYCODONE HYDROCHLORIDE 5 MG/1
5-10 TABLET ORAL EVERY 6 HOURS PRN
Qty: 20 TABLET | Refills: 0 | Status: SHIPPED | OUTPATIENT
Start: 2023-03-20 | End: 2023-07-18

## 2023-03-20 RX ORDER — CYCLOBENZAPRINE HCL 10 MG
10 TABLET ORAL 3 TIMES DAILY PRN
Qty: 30 TABLET | Refills: 0 | Status: SHIPPED | OUTPATIENT
Start: 2023-03-20 | End: 2023-04-13

## 2023-03-20 NOTE — TELEPHONE ENCOUNTER
No further refills without office visit.  Patient's pain medication should be monitored and prescribed by his back specialist.  Electronically signed:    Alvin Garcia PA-C

## 2023-03-20 NOTE — TELEPHONE ENCOUNTER
Is this a 2nd Level Triage? NO    SITUATION:   Patient has bad back pain.     BACKGROUND:   Patient is unable to sleep due to pain. He was last seen by Alvin on 01/12/23. He has been following [facility]. He had two injections last week. Now has new xrays and an MRI coming up.     PATIENT REQUEST:   Patient is wondering if Alvin will refill the Flexeril and Oxycodone since it helped him sleep. Or if there is something else Alvin would prescribed for him to help with sleep.     PLAN:       Routed to provider    Does the patient meet one of the following criteria for ADS visit consideration? No           СВЕТЛАНА ChesterN, RN, PHN  [place] [facility]  March 20, 2023

## 2023-03-31 ENCOUNTER — OFFICE VISIT (OUTPATIENT)
Dept: FAMILY MEDICINE | Facility: OTHER | Age: 43
End: 2023-03-31
Payer: COMMERCIAL

## 2023-03-31 VITALS
WEIGHT: 315 LBS | BODY MASS INDEX: 44.1 KG/M2 | TEMPERATURE: 97.6 F | RESPIRATION RATE: 24 BRPM | OXYGEN SATURATION: 98 % | HEIGHT: 71 IN | HEART RATE: 111 BPM | SYSTOLIC BLOOD PRESSURE: 138 MMHG | DIASTOLIC BLOOD PRESSURE: 82 MMHG

## 2023-03-31 DIAGNOSIS — E78.5 HYPERLIPIDEMIA LDL GOAL <130: ICD-10-CM

## 2023-03-31 DIAGNOSIS — M51.379 DEGENERATION OF LUMBAR OR LUMBOSACRAL INTERVERTEBRAL DISC: ICD-10-CM

## 2023-03-31 DIAGNOSIS — Z01.818 PREOP GENERAL PHYSICAL EXAM: Primary | ICD-10-CM

## 2023-03-31 DIAGNOSIS — J45.20 MILD INTERMITTENT ASTHMA WITHOUT COMPLICATION: ICD-10-CM

## 2023-03-31 DIAGNOSIS — I10 HTN, GOAL BELOW 140/80: ICD-10-CM

## 2023-03-31 DIAGNOSIS — E66.01 MORBID OBESITY (H): ICD-10-CM

## 2023-03-31 DIAGNOSIS — G47.33 OSA (OBSTRUCTIVE SLEEP APNEA): ICD-10-CM

## 2023-03-31 DIAGNOSIS — F32.1 MAJOR DEPRESSIVE DISORDER, SINGLE EPISODE, MODERATE (H): ICD-10-CM

## 2023-03-31 PROBLEM — M54.16 SUBACUTE LEFT LUMBAR RADICULOPATHY: Status: RESOLVED | Noted: 2023-03-09 | Resolved: 2023-03-31

## 2023-03-31 PROBLEM — F43.9 STRESS AT HOME: Status: RESOLVED | Noted: 2022-12-16 | Resolved: 2023-03-31

## 2023-03-31 PROBLEM — B27.89 OTHER INFECTIOUS MONONUCLEOSIS WITH OTHER COMPLICATION: Status: RESOLVED | Noted: 2019-09-19 | Resolved: 2023-03-31

## 2023-03-31 LAB
ANION GAP SERPL CALCULATED.3IONS-SCNC: 10 MMOL/L (ref 7–15)
BUN SERPL-MCNC: 24.5 MG/DL (ref 6–20)
CALCIUM SERPL-MCNC: 9.5 MG/DL (ref 8.6–10)
CHLORIDE SERPL-SCNC: 104 MMOL/L (ref 98–107)
CREAT SERPL-MCNC: 0.89 MG/DL (ref 0.67–1.17)
DEPRECATED HCO3 PLAS-SCNC: 23 MMOL/L (ref 22–29)
ERYTHROCYTE [DISTWIDTH] IN BLOOD BY AUTOMATED COUNT: 12.6 % (ref 10–15)
GFR SERPL CREATININE-BSD FRML MDRD: >90 ML/MIN/1.73M2
GLUCOSE SERPL-MCNC: 102 MG/DL (ref 70–99)
HCT VFR BLD AUTO: 44.9 % (ref 40–53)
HGB BLD-MCNC: 15.3 G/DL (ref 13.3–17.7)
MCH RBC QN AUTO: 31.1 PG (ref 26.5–33)
MCHC RBC AUTO-ENTMCNC: 34.1 G/DL (ref 31.5–36.5)
MCV RBC AUTO: 91 FL (ref 78–100)
PLATELET # BLD AUTO: 291 10E3/UL (ref 150–450)
POTASSIUM SERPL-SCNC: 3.7 MMOL/L (ref 3.4–5.3)
RBC # BLD AUTO: 4.92 10E6/UL (ref 4.4–5.9)
SODIUM SERPL-SCNC: 137 MMOL/L (ref 136–145)
WBC # BLD AUTO: 9.7 10E3/UL (ref 4–11)

## 2023-03-31 PROCEDURE — 85027 COMPLETE CBC AUTOMATED: CPT | Performed by: FAMILY MEDICINE

## 2023-03-31 PROCEDURE — 80048 BASIC METABOLIC PNL TOTAL CA: CPT | Performed by: FAMILY MEDICINE

## 2023-03-31 PROCEDURE — 99214 OFFICE O/P EST MOD 30 MIN: CPT | Performed by: FAMILY MEDICINE

## 2023-03-31 PROCEDURE — 36415 COLL VENOUS BLD VENIPUNCTURE: CPT | Performed by: FAMILY MEDICINE

## 2023-03-31 RX ORDER — TOPIRAMATE 25 MG/1
TABLET, FILM COATED ORAL
COMMUNITY
Start: 2023-03-15 | End: 2023-06-16

## 2023-03-31 ASSESSMENT — PAIN SCALES - GENERAL: PAINLEVEL: MODERATE PAIN (4)

## 2023-03-31 NOTE — PROGRESS NOTES
[address]  [facility] [address]  Phone: [phone]  Primary Provider: Alvin Barrett  Pre-op Performing Provider: DAVIE PERKINS      PREOPERATIVE EVALUATION:  Today's date: 3/31/2023    Frankie Gomez is a 42 year old male who presents for a preoperative evaluation.  Additional Questions 3/31/2023   Roomed by Gely FOUNTAIN   Accompanied by none     Forms 3/31/2023   Any forms needing to be completed Yes     Patient Reported Additional Medications 3/31/2023   Patient reports taking the following new medications none     Surgical Information:  Surgery/Procedure: Back surgery - herniation repair  Surgery Location: [facility]  Surgeon: Dr. Dewey  Surgery Date: 04/04/2023  Time of Surgery: Unknown  Where patient plans to recover: At home with family  Fax number for surgical facility: [phone]    Assessment & Plan     The proposed surgical procedure is considered INTERMEDIATE risk.    Problem List Items Addressed This Visit     Degeneration of lumbar or lumbosacral intervertebral disc    Major depressive disorder, single episode, moderate (H)    Morbid obesity (H)    HTN, goal below 140/80    Relevant Orders    CBC with platelets    Basic metabolic panel  (Ca, Cl, CO2, Creat, Gluc, K, Na, BUN)    NASIR (obstructive sleep apnea)    Mild intermittent asthma without complication    Hyperlipidemia LDL goal <130   Other Visit Diagnoses     Preop general physical exam    -  Primary        Risks and Recommendations:  The patient has the following additional risks and recommendations for perioperative complications:   - Morbid obesity (BMI >40)  Pulmonary:    - Incentive spirometry post-op  Obstructive Sleep Apnea:   Advised to take CPAP to the surgery centre    Medication Instructions:  Patient is to take all scheduled medications on the day of surgery EXCEPT for modifications listed below:   - ACE/ARB: May be continued on the day of surgery.    - Diuretics: HOLD  on the day of surgery.   - Opioids: Continue without modification.   - SSRIs, SNRIs, TCAs, Antipsychotics: Continue without modification.     RECOMMENDATION:  APPROVAL GIVEN to proceed with proposed procedure, without further diagnostic evaluation.        Subjective     HPI related to upcoming procedure: Patient with history of Lumbar DDD with disc herniation needing surgery and is here for preop     Preop Questions 3/31/2023   1. Have you ever had a heart attack or stroke? No   2. Have you ever had surgery on your heart or blood vessels, such as a stent placement, a coronary artery bypass, or surgery on an artery in your head, neck, heart, or legs? YES - Supraventricular tachycardia needing catheter ablation about 7 yrs ago. Not symptomatic since.    3. Do you have chest pain with activity? No   4. Do you have a history of  heart failure? No   5. Do you currently have a cold, bronchitis or symptoms of other infection? No   6. Do you have a cough, shortness of breath, or wheezing? No   7. Do you or anyone in your family have previous history of blood clots? No   8. Do you or does anyone in your family have a serious bleeding problem such as prolonged bleeding following surgeries or cuts? No   9. Have you ever had problems with anemia or been told to take iron pills? No   10. Have you had any abnormal blood loss such as black, tarry or bloody stools? No   11. Have you ever had a blood transfusion? No   12. Are you willing to have a blood transfusion if it is medically needed before, during, or after your surgery? Yes   13. Have you or any of your relatives ever had problems with anesthesia? UNKNOWN -    14. Do you have sleep apnea, excessive snoring or daytime drowsiness? YES - Has history NASIR on CPAP.    14a. Do you have a CPAP machine? Yes   15. Do you have any artifical heart valves or other implanted medical devices like a pacemaker, defibrillator, or continuous glucose monitor? No   16. Do you have artificial  joints? No   17. Are you allergic to latex? No       Health Care Directive:  Patient does not have a Health Care Directive or Living Will: Discussed advance care planning with patient; information given to patient to review.    Preoperative Review of :   reviewed - controlled substances reflected in medication list.          Review of Systems  CONSTITUTIONAL: NEGATIVE for fever, chills, change in weight  INTEGUMENTARY/SKIN: NEGATIVE for worrisome rashes, moles or lesions  EYES: NEGATIVE for vision changes or irritation  ENT/MOUTH: NEGATIVE for ear, mouth and throat problems  RESP: NEGATIVE for significant cough or SOB  CV: NEGATIVE for chest pain, palpitations or peripheral edema  GI: NEGATIVE for nausea, abdominal pain, heartburn, or change in bowel habits  : NEGATIVE for frequency, dysuria, or hematuria  MUSCULOSKELETAL: NEGATIVE for significant arthralgias or myalgia  NEURO: NEGATIVE for weakness, dizziness or paresthesias  ENDOCRINE: NEGATIVE for temperature intolerance, skin/hair changes  HEME: NEGATIVE for bleeding problems  PSYCHIATRIC: NEGATIVE for changes in mood or affect    Patient Active Problem List    Diagnosis Date Noted     Subacute left lumbar radiculopathy 03/09/2023     Priority: Medium     Gastroesophageal reflux disease with esophagitis, unspecified whether hemorrhage 01/12/2023     Priority: Medium     Stress at home 12/16/2022     Priority: Medium     History of colonic polyps 04/04/2022     Priority: Medium     adenomas       Hyperlipidemia LDL goal <130 03/03/2022     Priority: Medium     Family history of hemochromatosis 03/03/2022     Priority: Medium     Family history of colon cancer 03/03/2022     Priority: Medium     Mild intermittent asthma without complication 05/10/2021     Priority: Medium     Left carpal tunnel syndrome 01/13/2021     Priority: Medium     Right carpal tunnel syndrome 01/13/2021     Priority: Medium     Tension headache 09/19/2019     Priority: Medium      Other infectious mononucleosis with other complication 09/19/2019     Priority: Medium     Seasonal allergic rhinitis due to other allergic trigger 07/30/2019     Priority: Medium     Post-nasal drip 07/30/2019     Priority: Medium     Increased frequency of urination 07/30/2019     Priority: Medium     S/P laparoscopic cholecystectomy 02/13/2017     Priority: Medium     Diverticulosis of large intestine 01/01/2017     Priority: Medium     External hemorrhoids 01/01/2017     Priority: Medium     SVT (supraventricular tachycardia) (H) 05/27/2015     Priority: Medium     NASIR (obstructive sleep apnea) 12/15/2014     Priority: Medium     [place] 11/20/2014 AHI 5.2 RDI 11.8        Heartburn      Priority: Medium     Morbid obesity (H)      Priority: Medium     HTN, goal below 140/80      Priority: Medium     Fatty liver disease, nonalcoholic      Priority: Medium     Generalized anxiety disorder      Priority: Medium     Diagnosis updated by automated process. Provider to review and confirm.       Panic      Priority: Medium     Chronic low back pain      Priority: Medium     fall injury, fell through stairs-hit concrete       Lumbosacral neuritis 01/28/2013     Priority: Medium     CARDIOVASCULAR SCREENING; LDL GOAL LESS THAN 130 02/02/2011     Priority: Medium     Major depressive disorder, single episode, moderate (H) 02/02/2011     Priority: Medium     Displacement of intervertebral disc, site unspecified, without myelopathy 02/12/2007     Priority: Medium     Tobacco use disorder 11/21/2006     Priority: Medium     Pain in joint, forearm 06/25/2006     Priority: Medium     Degeneration of lumbar or lumbosacral intervertebral disc 08/10/2005     Priority: Medium      Past Medical History:   Diagnosis Date     Chronic low back pain     fall injury, fell through stairs-hit concrete     Depression      Depressive disorder      Diverticulitis      Fatty liver disease, nonalcoholic      Former smoker      MARISOL  (generalised anxiety disorder)      History of colonic polyps     adenomas     Hypertension      Morbid obesity (H)      OA (osteoarthritis)      NASIR (obstructive sleep apnea) 11/20/2014    AHI 5.2     Panic      SVT (supraventricular tachycardia) (H)     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     Uncomplicated asthma      Unspecified sinusitis (chronic)      Past Surgical History:   Procedure Laterality Date     ABDOMEN SURGERY       APPENDECTOMY  07/25/2009     COLONOSCOPY N/A 12/30/2016    Procedure: COMBINED COLONOSCOPY, SINGLE OR MULTIPLE BIOPSY/POLYPECTOMY BY BIOPSY;  Surgeon: Rikki Jenkins MD;  Location: [facility]     COLONOSCOPY N/A 03/30/2022    Procedure: COLONOSCOPY, WITH POLYPECTOMY AND BIOPSY;  Surgeon: Drew Zimmer MD;  Location: [facility]     ESOPHAGOSCOPY, GASTROSCOPY, DUODENOSCOPY (EGD), COMBINED N/A 12/30/2016    Procedure: COMBINED ESOPHAGOSCOPY, GASTROSCOPY, DUODENOSCOPY (EGD), BIOPSY SINGLE OR MULTIPLE;  Surgeon: Rikki Jenkins MD;  Location: [facility]     EXCISE MASS FOOT Right 02/13/2015    Procedure: EXCISE MASS FOOT;  Surgeon: Rmao Decker DPM;  Location: [facility]     LAPAROSCOPIC CHOLECYSTECTOMY N/A 02/13/2017    Procedure: LAPAROSCOPIC CHOLECYSTECTOMY;  Surgeon: Toby Bishop MD;  Location: [facility]     ZZHC COLONOSCOPY W SNARE REMOVAL TUMOR/POLYP/LESION  01/01/1998     Current Outpatient Medications   Medication Sig Dispense Refill     Acetaminophen (TYLENOL PO) Take 1,000 mg by mouth daily Reported on 2/13/2017       albuterol (PROAIR HFA/PROVENTIL HFA/VENTOLIN HFA) 108 (90 Base) MCG/ACT inhaler Inhale 2 puffs into the lungs every 6 hours 18 g 11     atorvastatin (LIPITOR) 10 MG tablet Take 1 tablet (10 mg) by mouth daily 90 tablet 3     buPROPion (WELLBUTRIN XL) 150 MG 24 hr tablet Take 1 tablet (150 mg) by mouth every morning 90 tablet 3     cyclobenzaprine (FLEXERIL) 10 MG tablet Take 1 tablet (10 mg) by mouth 3 times daily as needed for muscle spasms ( careful use as this can cause  "fatigue) 30 tablet 0     losartan-hydrochlorothiazide (HYZAAR) 50-12.5 MG tablet Take 1 tablet by mouth daily 90 tablet 3     meloxicam (MOBIC) 15 MG tablet Take 1 tablet (15 mg) by mouth daily 90 tablet 1     omeprazole (PRILOSEC) 40 MG DR capsule Take 1 capsule (40 mg) by mouth daily Reported on 2017 90 capsule 3     ondansetron (ZOFRAN ODT) 8 MG ODT tab Take 1 tablet (8 mg) by mouth every 8 hours as needed for nausea 20 tablet 0     oxyCODONE (ROXICODONE) 5 MG tablet Take 1-2 tablets (5-10 mg) by mouth every 6 hours as needed for pain 20 tablet 0     tamsulosin (FLOMAX) 0.4 MG capsule Take 1 capsule (0.4 mg) by mouth daily 90 capsule 3     topiramate (TOPAMAX) 25 MG tablet TAKE 1 TABLET BY MOUTH AT BEDTIME FOR 7 DAYS INCREASE BY 1 TABLET/DAY EACH WEEK AS TOLERATED UP TO 2 TABLETS TWICE A DAY         Allergies   Allergen Reactions     No Known Allergies         Social History     Tobacco Use     Smoking status: Former     Packs/day: 0.75     Years: 13.00     Pack years: 9.75     Types: Cigarettes     Quit date: 4/15/2013     Years since quittin.9     Smokeless tobacco: Never     Tobacco comments:     sometimes uses girlfriends vape - random   Substance Use Topics     Alcohol use: No     Family History   Problem Relation Age of Onset     Connective Tissue Disorder Mother         sjogrens     Depression Mother         on meds     Neurologic Disorder Mother         restless leg, migraines     Thyroid Disease Mother      C.A.D. Mother      Obesity Mother         Az-en-Y, snores     Obesity Father         snores     Cancer Paternal Grandmother      Obesity Paternal Grandfather      History   Drug Use     Types: Marijuana     Comment: marijuana occasional use         Objective     /82 (Cuff Size: Adult Large)   Pulse 111   Temp 97.6  F (36.4  C) (Temporal)   Resp 24   Ht 5' 11\" (1.803 m)   Wt (!) 363 lb (164.7 kg)   SpO2 98%   BMI 50.63 kg/m      Physical Exam    GENERAL APPEARANCE: healthy, " alert and no distress     EYES: EOMI,  PERRL     HENT: ear canals and TM's normal and nose and mouth without ulcers or lesions     NECK: no adenopathy, no asymmetry, masses, or scars and thyroid normal to palpation     RESP: lungs clear to auscultation - no rales, rhonchi or wheezes     CV: regular rates and rhythm, normal S1 S2, no S3 or S4 and no murmur, click or rub     ABDOMEN:  soft, nontender, no HSM or masses and bowel sounds normal     MS: extremities normal- no gross deformities noted, no evidence of inflammation in joints, FROM in all extremities.     SKIN: no suspicious lesions or rashes     NEURO: Normal strength and tone, sensory exam grossly normal, mentation intact and speech normal     PSYCH: mentation appears normal. and affect normal/bright     LYMPHATICS: No cervical adenopathy    Recent Labs   Lab Test 12/16/22  1026   HGB 14.6         POTASSIUM 3.9   CR 0.63*        Diagnostics:  Labs pending at this time.  Results will be reviewed when available.   No EKG required, no history of coronary heart disease, significant arrhythmia, peripheral arterial disease or other structural heart disease.    Revised Cardiac Risk Index (RCRI):  The patient has the following serious cardiovascular risks for perioperative complications:   - No serious cardiac risks = 0 points     RCRI Interpretation: 0 points: Class I (very low risk - 0.4% complication rate)           Signed Electronically by: Jessy Diehl MD  Copy of this evaluation report is provided to requesting physician.

## 2023-03-31 NOTE — PATIENT INSTRUCTIONS
For informational purposes only. Not to replace the advice of your health care provider. Copyright   2003,  [employer] [facility]. All rights reserved. Clinically reviewed by Radha Danielson MD. [employer] [id number] - REV .  Preparing for Your Surgery  Getting started  A nurse will call you to review your health history and instructions. They will give you an arrival time based on your scheduled surgery time. Please be ready to share:    Your doctor's clinic name and phone number    Your medical, surgical, and anesthesia history    A list of allergies and sensitivities    A list of medicines, including herbal treatments and over-the-counter drugs    Whether the patient has a legal guardian (ask how to send us the papers in advance)  Please tell us if you're pregnant--or if there's any chance you might be pregnant. Some surgeries may injure a fetus (unborn baby), so they require a pregnancy test. Surgeries that are safe for a fetus don't always need a test, and you can choose whether to have one.   If you have a child who's having surgery, please ask for a copy of Preparing for Your Child's Surgery.    Preparing for surgery    Within 10 to 30 days of surgery: Have a pre-op exam (sometimes called an H&P, or History and Physical). This can be done at a clinic or pre-operative center.  ? If you're having a , you may not need this exam. Talk to your care team.    At your pre-op exam, talk to your care team about all medicines you take. If you need to stop any medicines before surgery, ask when to start taking them again.  ? We do this for your safety. Many medicines can make you bleed too much during surgery. Some change how well surgery (anesthesia) drugs work.    Call your insurance company to let them know you're having surgery. (If you don't have insurance, call [phone].)    Call your clinic if there's any change in your health. This includes signs of a cold or flu (sore throat, runny nose,  cough, rash, fever). It also includes a scrape or scratch near the surgery site.    If you have questions on the day of surgery, call your hospital or surgery center.  Eating and drinking guidelines  For your safety: Unless your surgeon tells you otherwise, follow the guidelines below.    Eat and drink as usual until 8 hours before you arrive for surgery. After that, no food or milk.    Drink clear liquids until 2 hours before you arrive. These are liquids you can see through, like water, Gatorade, and Propel Water. They also include plain black coffee and tea (no cream or milk), candy, and breath mints. You can spit out gum when you arrive.    If you drink alcohol: Stop drinking it the night before surgery.    If your care team tells you to take medicine on the morning of surgery, it's okay to take it with a sip of water.  Preventing infection    Shower or bathe the night before and morning of your surgery. Follow the instructions your clinic gave you. (If no instructions, use regular soap.)    Don't shave or clip hair near your surgery site. We'll remove the hair if needed.    Don't smoke or vape the morning of surgery. You may chew nicotine gum up to 2 hours before surgery. A nicotine patch is okay.  ? Note: Some surgeries require you to completely quit smoking and nicotine. Check with your surgeon.    Your care team will make every effort to keep you safe from infection. We will:  ? Clean our hands often with soap and water (or an alcohol-based hand rub).  ? Clean the skin at your surgery site with a special soap that kills germs.  ? Give you a special gown to keep you warm. (Cold raises the risk of infection.)  ? Wear special hair covers, masks, gowns and gloves during surgery.  ? Give antibiotic medicine, if prescribed. Not all surgeries need antibiotics.  What to bring on the day of surgery    Photo ID and insurance card    Copy of your health care directive, if you have one    Glasses and hearing aids (bring  cases)  ? You can't wear contacts during surgery    Inhaler and eye drops, if you use them (tell us about these when you arrive)    CPAP machine or breathing device, if you use them    A few personal items, if spending the night    If you have . . .  ? A pacemaker, ICD (cardiac defibrillator) or other implant: Bring the ID card.  ? An implanted stimulator: Bring the remote control.  ? A legal guardian: Bring a copy of the certified (court-stamped) guardianship papers.  Please remove any jewelry, including body piercings. Leave jewelry and other valuables at home.  If you're going home the day of surgery    You must have a responsible adult drive you home. They should stay with you overnight as well.    If you don't have someone to stay with you, and you aren't safe to go home alone, we may keep you overnight. Insurance often won't pay for this.  After surgery  If it's hard to control your pain or you need more pain medicine, please call your surgeon's office.  Questions?   If you have any questions for your care team, list them here: _________________________________________________________________________________________________________________________________________________________________________ ____________________________________ ____________________________________ ____________________________________

## 2023-04-03 NOTE — RESULT ENCOUNTER NOTE
Mr. Gomez    Your recent test results are attached.  Normal preop labs.    If you have any questions or concerns please contact me via My Chart or call the clinic at [phone]     Thank You  Jessy Diehl MD.

## 2023-04-11 ASSESSMENT — PATIENT HEALTH QUESTIONNAIRE - PHQ9
SUM OF ALL RESPONSES TO PHQ QUESTIONS 1-9: 6
SUM OF ALL RESPONSES TO PHQ QUESTIONS 1-9: 6
10. IF YOU CHECKED OFF ANY PROBLEMS, HOW DIFFICULT HAVE THESE PROBLEMS MADE IT FOR YOU TO DO YOUR WORK, TAKE CARE OF THINGS AT HOME, OR GET ALONG WITH OTHER PEOPLE: SOMEWHAT DIFFICULT

## 2023-04-13 ENCOUNTER — OFFICE VISIT (OUTPATIENT)
Dept: FAMILY MEDICINE | Facility: OTHER | Age: 43
End: 2023-04-13
Payer: COMMERCIAL

## 2023-04-13 VITALS
SYSTOLIC BLOOD PRESSURE: 132 MMHG | TEMPERATURE: 97.8 F | HEIGHT: 71 IN | BODY MASS INDEX: 44.1 KG/M2 | WEIGHT: 315 LBS | OXYGEN SATURATION: 98 % | RESPIRATION RATE: 24 BRPM | HEART RATE: 87 BPM | DIASTOLIC BLOOD PRESSURE: 82 MMHG

## 2023-04-13 DIAGNOSIS — M51.379 DEGENERATION OF LUMBAR OR LUMBOSACRAL INTERVERTEBRAL DISC: ICD-10-CM

## 2023-04-13 DIAGNOSIS — I10 HTN, GOAL BELOW 140/80: ICD-10-CM

## 2023-04-13 DIAGNOSIS — F32.1 MAJOR DEPRESSIVE DISORDER, SINGLE EPISODE, MODERATE (H): ICD-10-CM

## 2023-04-13 DIAGNOSIS — E78.5 HYPERLIPIDEMIA LDL GOAL <130: ICD-10-CM

## 2023-04-13 DIAGNOSIS — J45.20 MILD INTERMITTENT ASTHMA WITHOUT COMPLICATION: ICD-10-CM

## 2023-04-13 DIAGNOSIS — G47.33 OSA (OBSTRUCTIVE SLEEP APNEA): ICD-10-CM

## 2023-04-13 DIAGNOSIS — F41.1 GENERALIZED ANXIETY DISORDER: ICD-10-CM

## 2023-04-13 DIAGNOSIS — R73.9 HYPERGLYCEMIA: Primary | ICD-10-CM

## 2023-04-13 LAB
ALBUMIN SERPL BCG-MCNC: 4.1 G/DL (ref 3.5–5.2)
ALP SERPL-CCNC: 122 U/L (ref 40–129)
ALT SERPL W P-5'-P-CCNC: 34 U/L (ref 10–50)
ANION GAP SERPL CALCULATED.3IONS-SCNC: 13 MMOL/L (ref 7–15)
AST SERPL W P-5'-P-CCNC: 22 U/L (ref 10–50)
BILIRUB SERPL-MCNC: 0.7 MG/DL
BUN SERPL-MCNC: 19.4 MG/DL (ref 6–20)
CALCIUM SERPL-MCNC: 9.8 MG/DL (ref 8.6–10)
CHLORIDE SERPL-SCNC: 102 MMOL/L (ref 98–107)
CHOLEST SERPL-MCNC: 189 MG/DL
CREAT SERPL-MCNC: 0.94 MG/DL (ref 0.67–1.17)
DEPRECATED HCO3 PLAS-SCNC: 21 MMOL/L (ref 22–29)
ERYTHROCYTE [DISTWIDTH] IN BLOOD BY AUTOMATED COUNT: 12.5 % (ref 10–15)
GFR SERPL CREATININE-BSD FRML MDRD: >90 ML/MIN/1.73M2
GLUCOSE SERPL-MCNC: 111 MG/DL (ref 70–99)
HBA1C MFR BLD: 5.8 % (ref 0–5.6)
HCT VFR BLD AUTO: 41.2 % (ref 40–53)
HDLC SERPL-MCNC: 40 MG/DL
HGB BLD-MCNC: 14.1 G/DL (ref 13.3–17.7)
LDLC SERPL CALC-MCNC: 118 MG/DL
MCH RBC QN AUTO: 31.2 PG (ref 26.5–33)
MCHC RBC AUTO-ENTMCNC: 34.2 G/DL (ref 31.5–36.5)
MCV RBC AUTO: 91 FL (ref 78–100)
NONHDLC SERPL-MCNC: 149 MG/DL
PLATELET # BLD AUTO: 273 10E3/UL (ref 150–450)
POTASSIUM SERPL-SCNC: 3.6 MMOL/L (ref 3.4–5.3)
PROT SERPL-MCNC: 7.8 G/DL (ref 6.4–8.3)
RBC # BLD AUTO: 4.52 10E6/UL (ref 4.4–5.9)
SODIUM SERPL-SCNC: 136 MMOL/L (ref 136–145)
TRIGL SERPL-MCNC: 156 MG/DL
WBC # BLD AUTO: 6.8 10E3/UL (ref 4–11)

## 2023-04-13 PROCEDURE — 36415 COLL VENOUS BLD VENIPUNCTURE: CPT | Performed by: PHYSICIAN ASSISTANT

## 2023-04-13 PROCEDURE — 80061 LIPID PANEL: CPT | Performed by: PHYSICIAN ASSISTANT

## 2023-04-13 PROCEDURE — 85027 COMPLETE CBC AUTOMATED: CPT | Performed by: PHYSICIAN ASSISTANT

## 2023-04-13 PROCEDURE — 96127 BRIEF EMOTIONAL/BEHAV ASSMT: CPT | Performed by: PHYSICIAN ASSISTANT

## 2023-04-13 PROCEDURE — 83036 HEMOGLOBIN GLYCOSYLATED A1C: CPT | Performed by: PHYSICIAN ASSISTANT

## 2023-04-13 PROCEDURE — 99214 OFFICE O/P EST MOD 30 MIN: CPT | Performed by: PHYSICIAN ASSISTANT

## 2023-04-13 PROCEDURE — 80053 COMPREHEN METABOLIC PANEL: CPT | Performed by: PHYSICIAN ASSISTANT

## 2023-04-13 RX ORDER — METHOCARBAMOL 750 MG/1
750 TABLET, FILM COATED ORAL EVERY 6 HOURS PRN
COMMUNITY
Start: 2023-04-04 | End: 2023-07-18

## 2023-04-13 ASSESSMENT — PAIN SCALES - GENERAL: PAINLEVEL: MODERATE PAIN (5)

## 2023-04-13 ASSESSMENT — PATIENT HEALTH QUESTIONNAIRE - PHQ9
SUM OF ALL RESPONSES TO PHQ QUESTIONS 1-9: 6
10. IF YOU CHECKED OFF ANY PROBLEMS, HOW DIFFICULT HAVE THESE PROBLEMS MADE IT FOR YOU TO DO YOUR WORK, TAKE CARE OF THINGS AT HOME, OR GET ALONG WITH OTHER PEOPLE: SOMEWHAT DIFFICULT

## 2023-04-13 NOTE — PROGRESS NOTES
Assessment & Plan     Hyperglycemia  Historically noted from hospitalization.  Rechecking this to make sure he has not developed diabetes  - Comprehensive metabolic panel (BMP + Alb, Alk Phos, ALT, AST, Total. Bili, TP); Future  - Lipid panel reflex to direct LDL Fasting; Future  - Hemoglobin A1c; Future    NASIR (obstructive sleep apnea)  Mild intermittent asthma without complication  Stable at this time.    HTN, goal below 140/80  Good control  - Comprehensive metabolic panel (BMP + Alb, Alk Phos, ALT, AST, Total. Bili, TP); Future  - Lipid panel reflex to direct LDL Fasting; Future  - Hemoglobin A1c; Future    Degeneration of lumbar or lumbosacral intervertebral disc  Generalized anxiety disorder  Major depressive disorder, single episode, moderate (H)  Historically noted.    Hyperlipidemia LDL goal <130  - Comprehensive metabolic panel (BMP + Alb, Alk Phos, ALT, AST, Total. Bili, TP); Future  - Lipid panel reflex to direct LDL Fasting; Future  - Hemoglobin A1c; Future          [facility]    Darren Swenson is a 42 year old, presenting for the following health issues:  Follow Up        4/13/2023     7:46 AM   Additional Questions   Roomed by Diana     History of Present Illness       Back Pain:  He presents for follow up of back pain. Patient's back pain is a chronic problem.  Location of back pain:  Left lower back, right buttock, left buttock and other  Description of back pain: burning, cramping, dull ache, fullness, gnawing, sharp, shooting, stabbing and other  Back pain spreads: nowhere    Since patient first noticed back pain, pain is: unchanged  Does back pain interfere with his job:  Yes      He eats 0-1 servings of fruits and vegetables daily.He consumes 1 sweetened beverage(s) daily.He exercises with enough effort to increase his heart rate 10 to 19 minutes per day.  He exercises with enough effort to increase his heart rate 4 days per week. He is missing 1  "dose(s) of medications per week.  He is not taking prescribed medications regularly due to remembering to take.    Today's PHQ-9         PHQ-9 Total Score: 6    PHQ-9 Q9 Thoughts of better off dead/self-harm past 2 weeks :   Not at all    How difficult have these problems made it for you to do your work, take care of things at home, or get along with other people: Somewhat difficult     Review of Systems   Constitutional, HEENT, cardiovascular, pulmonary, GI, , musculoskeletal, neuro, skin, endocrine and psych systems are negative, except as otherwise noted.      Objective    /82   Pulse 87   Temp 97.8  F (36.6  C) (Temporal)   Resp 24   Ht 5' 11\" (1.803 m)   Wt (!) 351 lb (159.2 kg)   SpO2 98%   BMI 48.95 kg/m    Body mass index is 48.95 kg/m .  Physical Exam   GENERAL: healthy, alert and no distress  NECK: no adenopathy, no asymmetry, masses, or scars and thyroid normal to palpation  RESP: lungs clear to auscultation - no rales, rhonchi or wheezes  CV: regular rate and rhythm, normal S1 S2, no S3 or S4, no murmur, click or rub, no peripheral edema and peripheral pulses strong  ABDOMEN: soft, nontender, no hepatosplenomegaly, no masses and bowel sounds normal  MS: no gross musculoskeletal defects noted, no edema, he does move very gingerly in clinic today given his recent lumbar spinal surgery this makes sense.  SKIN: Surgical site is reviewed today.  Some bruising is noted around it but there does not appear to be any infection.  Steri-Strips are still in place.    No results found for this or any previous visit (from the past 24 hour(s)).                "

## 2023-05-21 ENCOUNTER — HEALTH MAINTENANCE LETTER (OUTPATIENT)
Age: 43
End: 2023-05-21

## 2023-06-16 DIAGNOSIS — G44.209 TENSION HEADACHE: Primary | ICD-10-CM

## 2023-06-19 RX ORDER — TOPIRAMATE 25 MG/1
TABLET, FILM COATED ORAL
Qty: 90 TABLET | Refills: 0 | Status: SHIPPED | OUTPATIENT
Start: 2023-06-19 | End: 2023-07-18

## 2023-07-16 ASSESSMENT — ASTHMA QUESTIONNAIRES: ACT_TOTALSCORE: 25

## 2023-07-18 ENCOUNTER — VIRTUAL VISIT (OUTPATIENT)
Dept: FAMILY MEDICINE | Facility: OTHER | Age: 43
End: 2023-07-18
Payer: COMMERCIAL

## 2023-07-18 DIAGNOSIS — G47.33 OSA (OBSTRUCTIVE SLEEP APNEA): ICD-10-CM

## 2023-07-18 DIAGNOSIS — R73.03 PREDIABETES: ICD-10-CM

## 2023-07-18 DIAGNOSIS — F41.1 GENERALIZED ANXIETY DISORDER: ICD-10-CM

## 2023-07-18 DIAGNOSIS — G44.209 TENSION HEADACHE: Primary | ICD-10-CM

## 2023-07-18 DIAGNOSIS — G44.209 TENSION HEADACHE: ICD-10-CM

## 2023-07-18 DIAGNOSIS — I10 HTN, GOAL BELOW 140/80: ICD-10-CM

## 2023-07-18 DIAGNOSIS — E66.01 MORBID OBESITY (H): ICD-10-CM

## 2023-07-18 DIAGNOSIS — F32.1 MAJOR DEPRESSIVE DISORDER, SINGLE EPISODE, MODERATE (H): ICD-10-CM

## 2023-07-18 DIAGNOSIS — M51.379 DEGENERATION OF LUMBAR OR LUMBOSACRAL INTERVERTEBRAL DISC: ICD-10-CM

## 2023-07-18 DIAGNOSIS — E78.5 HYPERLIPIDEMIA LDL GOAL <130: ICD-10-CM

## 2023-07-18 DIAGNOSIS — J45.20 MILD INTERMITTENT ASTHMA WITHOUT COMPLICATION: ICD-10-CM

## 2023-07-18 PROBLEM — M51.26 LUMBAR DISC HERNIATION: Status: ACTIVE | Noted: 2023-04-04

## 2023-07-18 PROBLEM — R12 HEARTBURN: Status: ACTIVE | Noted: 2019-01-02

## 2023-07-18 PROCEDURE — 99213 OFFICE O/P EST LOW 20 MIN: CPT | Mod: VID | Performed by: PHYSICIAN ASSISTANT

## 2023-07-18 RX ORDER — TOPIRAMATE 50 MG/1
50 TABLET, FILM COATED ORAL 2 TIMES DAILY
Qty: 180 TABLET | Refills: 3 | Status: SHIPPED | OUTPATIENT
Start: 2023-07-18 | End: 2023-07-21

## 2023-07-18 RX ORDER — TOPIRAMATE 50 MG/1
TABLET, FILM COATED ORAL
Qty: 180 TABLET | Refills: 3 | OUTPATIENT
Start: 2023-07-18

## 2023-07-18 ASSESSMENT — PATIENT HEALTH QUESTIONNAIRE - PHQ9
10. IF YOU CHECKED OFF ANY PROBLEMS, HOW DIFFICULT HAVE THESE PROBLEMS MADE IT FOR YOU TO DO YOUR WORK, TAKE CARE OF THINGS AT HOME, OR GET ALONG WITH OTHER PEOPLE: EXTREMELY DIFFICULT
SUM OF ALL RESPONSES TO PHQ QUESTIONS 1-9: 13
SUM OF ALL RESPONSES TO PHQ QUESTIONS 1-9: 13

## 2023-07-18 NOTE — PROGRESS NOTES
"Messi is a 42 year old who is being evaluated via a billable video visit.      How would you like to obtain your AVS? MyChart  If the video visit is dropped, the invitation should be resent by: Text to cell phone: [phone]  Will anyone else be joining your video visit? No  Patient completed E-check in. Questionnaires were blown in and Patient checked in without being called.    Assessment & Plan     Tension headache  Refill occasions requested.  Follow-up in 2 to 3 months is recommended.  - topiramate (TOPAMAX) 50 MG tablet; Take 1 tablet (50 mg) by mouth 2 times daily TAKE 1 TABLET BY MOUTH AT BEDTIME FOR 7 DAYS INCREASE BY 1 TABLET/DAY EACH WEEK AS TOLERATED UP TO 2 TABLETS TWICE A DAY    NASIR (obstructive sleep apnea)  Concerns exist.  Continue transfer recommended.    Mild intermittent asthma without complication  Major depressive disorder, single episode, moderate (H)  Generalized anxiety disorder  Degeneration of lumbar or lumbosacral intervertebral disc  Morbid obesity (H)  Ongoing concerns discussed.  Follow-up in 2 to 3-month.    HTN, goal below 140/80  Follow-up in 2 to 3 months.  - Comprehensive metabolic panel (BMP + Alb, Alk Phos, ALT, AST, Total. Bili, TP); Future  - Lipid panel reflex to direct LDL Fasting; Future    Hyperlipidemia LDL goal <130  - Comprehensive metabolic panel (BMP + Alb, Alk Phos, ALT, AST, Total. Bili, TP); Future  - Lipid panel reflex to direct LDL Fasting; Future    Prediabetes  - Comprehensive metabolic panel (BMP + Alb, Alk Phos, ALT, AST, Total. Bili, TP); Future  - Lipid panel reflex to direct LDL Fasting; Future      BMI:   Estimated body mass index is 48.95 kg/m  as calculated from the following:    Height as of 4/13/23: 1.803 m (5' 11\").    Weight as of 4/13/23: 159.2 kg (351 lb).   Weight management plan: Discussed healthy diet and exercise guidelines    [facility] PA[facility]    Darren Swenson is a 42 year old, presenting for the " following health issues:  Refill Request (Topomax)  -Chat about medication- Topamax    -Feeling sad after having to put his dog down over the weekend, hasn't been able to get his stuff done around the house.      7/18/2023     7:51 AM   Additional Questions   Roomed by Galen MORALES   Accompanied by Self         7/18/2023     7:51 AM   Patient Reported Additional Medications   Patient reports taking the following new medications None     History of Present Illness       Reason for visit:  Discuss Topiromate refill    He eats 0-1 servings of fruits and vegetables daily.He consumes 1 sweetened beverage(s) daily.He exercises with enough effort to increase his heart rate 10 to 19 minutes per day.  He exercises with enough effort to increase his heart rate 4 days per week.   He is taking medications regularly.           Review of Systems   Constitutional, HEENT, cardiovascular, pulmonary, GI, , musculoskeletal, neuro, skin, endocrine and psych systems are negative, except as otherwise noted.      Objective           Vitals:  No vitals were obtained today due to virtual visit.    Physical Exam   GENERAL: Healthy, alert and no distress  EYES: Eyes grossly normal to inspection.  No discharge or erythema, or obvious scleral/conjunctival abnormalities.  RESP: No audible wheeze, cough, or visible cyanosis.  No visible retractions or increased work of breathing.    SKIN: Visible skin clear. No significant rash, abnormal pigmentation or lesions.  NEURO: Cranial nerves grossly intact.  Mentation and speech appropriate for age.  PSYCH: Mentation appears normal, affect normal/bright, judgement and insight intact, normal speech and appearance well-groomed.          Video-Visit Details    Type of service:  Video Visit     Originating Location (pt. Location): Home  Distant Location (provider location):  On-site  Platform used for Video Visit: [employer]

## 2023-07-21 DIAGNOSIS — M51.379 DEGENERATION OF LUMBAR OR LUMBOSACRAL INTERVERTEBRAL DISC: ICD-10-CM

## 2023-07-21 RX ORDER — MELOXICAM 15 MG/1
TABLET ORAL
Qty: 90 TABLET | Refills: 1 | Status: SHIPPED | OUTPATIENT
Start: 2023-07-21 | End: 2024-01-31

## 2023-07-21 RX ORDER — TOPIRAMATE 50 MG/1
50 TABLET, FILM COATED ORAL 2 TIMES DAILY
Qty: 180 TABLET | Refills: 3 | Status: SHIPPED | OUTPATIENT
Start: 2023-07-21 | End: 2024-09-26

## 2023-07-21 NOTE — TELEPHONE ENCOUNTER
Pharmacy calling for clarification.            Please update and resend prescription to [facility].

## 2023-12-12 ENCOUNTER — TELEPHONE (OUTPATIENT)
Dept: FAMILY MEDICINE | Facility: OTHER | Age: 43
End: 2023-12-12

## 2023-12-12 ENCOUNTER — IMMUNIZATION (OUTPATIENT)
Dept: FAMILY MEDICINE | Facility: CLINIC | Age: 43
End: 2023-12-12
Payer: COMMERCIAL

## 2023-12-12 DIAGNOSIS — Z23 ENCOUNTER FOR IMMUNIZATION: Primary | ICD-10-CM

## 2023-12-12 PROCEDURE — 91320 SARSCV2 VAC 30MCG TRS-SUC IM: CPT

## 2023-12-12 PROCEDURE — 90471 IMMUNIZATION ADMIN: CPT

## 2023-12-12 PROCEDURE — 90686 IIV4 VACC NO PRSV 0.5 ML IM: CPT

## 2023-12-12 PROCEDURE — 99207 PR NO CHARGE NURSE ONLY: CPT

## 2023-12-12 PROCEDURE — 90480 ADMN SARSCOV2 VAC 1/ONLY CMP: CPT

## 2023-12-12 NOTE — TELEPHONE ENCOUNTER
"Patient is looking to get the most recent COVID vaccine as his children returned from their father's house yesterday and tested positive for COVID yesterday. Patient reports \"I am very behind on getting the updated vaccine and my wife is unable to get it due to health issues so I am hoping I can get it to prevent getting sick.\"    Patient reports he tested negative for COVID yesterday and currently has no symptoms.    RN advised patient that the vaccine does not provide immediate protection but each day this will increase.    Patient asked if [place] has Moderna. RN informed patient that [place] carries Pfizer. Patient can get Moderna or Pfizer, but if he desires Moderna vaccine to check with pharmacies near him.    RN reaching out to PCP due to known close contact exposure to COVID in the home and if vaccination is still recommended at this time due negative test yesterday and no symptom or if patient should delay vaccination due to exposure and still within the incubation period.    Patient reports callback or sending information via [employer] is okay.     Patient currently has appointment scheduled today in [place] for COVID vaccine and Flu vaccine.     RN advised of precautions to take at home for protection against COVID infection.     СВЕТЛАНА GaleasN, RN  [facility] ~ Registered Nurse  Clinic Triage ~ [place] [facility]  December 12, 2023        "

## 2023-12-12 NOTE — TELEPHONE ENCOUNTER
Called and spoke with patient. RN advised of the below. Patient verbalized understanding and all questions answered.     СВЕТЛАНА GaleasN, RN  [facility] ~ Registered Nurse  Clinic Triage ~ [place] [facility]  December 12, 2023

## 2023-12-17 ASSESSMENT — SLEEP AND FATIGUE QUESTIONNAIRES
HOW LIKELY ARE YOU TO NOD OFF OR FALL ASLEEP WHEN YOU ARE A PASSENGER IN A CAR FOR AN HOUR WITHOUT A BREAK: WOULD NEVER DOZE
HOW LIKELY ARE YOU TO NOD OFF OR FALL ASLEEP WHILE WATCHING TV: SLIGHT CHANCE OF DOZING
HOW LIKELY ARE YOU TO NOD OFF OR FALL ASLEEP WHILE SITTING AND TALKING TO SOMEONE: WOULD NEVER DOZE
HOW LIKELY ARE YOU TO NOD OFF OR FALL ASLEEP IN A CAR, WHILE STOPPED FOR A FEW MINUTES IN TRAFFIC: WOULD NEVER DOZE
HOW LIKELY ARE YOU TO NOD OFF OR FALL ASLEEP WHILE LYING DOWN TO REST IN THE AFTERNOON WHEN CIRCUMSTANCES PERMIT: SLIGHT CHANCE OF DOZING
HOW LIKELY ARE YOU TO NOD OFF OR FALL ASLEEP WHILE SITTING INACTIVE IN A PUBLIC PLACE: WOULD NEVER DOZE
HOW LIKELY ARE YOU TO NOD OFF OR FALL ASLEEP WHILE SITTING AND READING: MODERATE CHANCE OF DOZING
HOW LIKELY ARE YOU TO NOD OFF OR FALL ASLEEP WHILE SITTING QUIETLY AFTER LUNCH WITHOUT ALCOHOL: SLIGHT CHANCE OF DOZING

## 2023-12-18 ENCOUNTER — VIRTUAL VISIT (OUTPATIENT)
Dept: SLEEP MEDICINE | Facility: CLINIC | Age: 43
End: 2023-12-18
Payer: COMMERCIAL

## 2023-12-18 DIAGNOSIS — G47.33 OSA (OBSTRUCTIVE SLEEP APNEA): Primary | ICD-10-CM

## 2023-12-18 PROCEDURE — 99203 OFFICE O/P NEW LOW 30 MIN: CPT | Mod: VID | Performed by: PHYSICIAN ASSISTANT

## 2023-12-18 NOTE — PROGRESS NOTES
Writer faxed CPAP replacement device order to [facility] per patient request.     FAX: [phone]
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
## 2023-12-21 DIAGNOSIS — I10 HTN, GOAL BELOW 140/80: ICD-10-CM

## 2023-12-21 DIAGNOSIS — E78.5 HYPERLIPIDEMIA LDL GOAL <130: ICD-10-CM

## 2023-12-21 DIAGNOSIS — F32.1 MAJOR DEPRESSIVE DISORDER, SINGLE EPISODE, MODERATE (H): ICD-10-CM

## 2023-12-21 DIAGNOSIS — K21.00 GASTROESOPHAGEAL REFLUX DISEASE WITH ESOPHAGITIS, UNSPECIFIED WHETHER HEMORRHAGE: ICD-10-CM

## 2023-12-22 RX ORDER — LOSARTAN POTASSIUM AND HYDROCHLOROTHIAZIDE 12.5; 5 MG/1; MG/1
1 TABLET ORAL DAILY
Qty: 90 TABLET | Refills: 0 | Status: SHIPPED | OUTPATIENT
Start: 2023-12-22 | End: 2024-04-30

## 2023-12-22 RX ORDER — OMEPRAZOLE 40 MG/1
40 CAPSULE, DELAYED RELEASE ORAL DAILY
Qty: 90 CAPSULE | Refills: 3 | Status: SHIPPED | OUTPATIENT
Start: 2023-12-22

## 2023-12-22 RX ORDER — BUPROPION HYDROCHLORIDE 150 MG/1
150 TABLET ORAL EVERY MORNING
Qty: 90 TABLET | Refills: 3 | Status: SHIPPED | OUTPATIENT
Start: 2023-12-22

## 2023-12-22 RX ORDER — ATORVASTATIN CALCIUM 10 MG/1
10 TABLET, FILM COATED ORAL DAILY
Qty: 90 TABLET | Refills: 0 | Status: SHIPPED | OUTPATIENT
Start: 2023-12-22 | End: 2024-04-30

## 2023-12-22 RX ORDER — TAMSULOSIN HYDROCHLORIDE 0.4 MG/1
0.4 CAPSULE ORAL DAILY
Qty: 90 CAPSULE | Refills: 0 | Status: SHIPPED | OUTPATIENT
Start: 2023-12-22 | End: 2024-04-30

## 2023-12-26 ENCOUNTER — TELEPHONE (OUTPATIENT)
Dept: SLEEP MEDICINE | Facility: CLINIC | Age: 43
End: 2023-12-26
Payer: COMMERCIAL

## 2023-12-26 NOTE — TELEPHONE ENCOUNTER
Writer received a call from [employer]. They are requesting the CPAP device settings. Writer re faxed the order to [phone].    Jed KAUFFMAN CMA

## 2024-01-10 ENCOUNTER — MYC MEDICAL ADVICE (OUTPATIENT)
Dept: SLEEP MEDICINE | Facility: CLINIC | Age: 44
End: 2024-01-10
Payer: COMMERCIAL

## 2024-01-30 DIAGNOSIS — M51.379 DEGENERATION OF LUMBAR OR LUMBOSACRAL INTERVERTEBRAL DISC: ICD-10-CM

## 2024-01-31 RX ORDER — MELOXICAM 15 MG/1
TABLET ORAL
Qty: 90 TABLET | Refills: 1 | Status: SHIPPED | OUTPATIENT
Start: 2024-01-31 | End: 2024-07-25

## 2024-03-29 NOTE — TELEPHONE ENCOUNTER
Mailed order to remind pt to get NM hepatobiliary w rx done.   Referral placed for orthopedics.  Electronically signed:    Alvin Garcia PA-C     Your provider has referred you to: FM: [facility] [place], [place] [phone]   [email or web address]  UMP: [facility] [phone]   [email or web address]  [facility] [place] [phone]   [email or web address] [phone]   [email or web address]

## 2024-04-29 DIAGNOSIS — E78.5 HYPERLIPIDEMIA LDL GOAL <130: ICD-10-CM

## 2024-04-29 DIAGNOSIS — I10 HTN, GOAL BELOW 140/80: ICD-10-CM

## 2024-04-30 RX ORDER — ATORVASTATIN CALCIUM 10 MG/1
10 TABLET, FILM COATED ORAL DAILY
Qty: 90 TABLET | Refills: 0 | Status: SHIPPED | OUTPATIENT
Start: 2024-04-30 | End: 2024-07-25

## 2024-04-30 RX ORDER — TAMSULOSIN HYDROCHLORIDE 0.4 MG/1
0.4 CAPSULE ORAL DAILY
Qty: 90 CAPSULE | Refills: 0 | Status: SHIPPED | OUTPATIENT
Start: 2024-04-30 | End: 2024-07-25

## 2024-04-30 RX ORDER — LOSARTAN POTASSIUM AND HYDROCHLOROTHIAZIDE 12.5; 5 MG/1; MG/1
1 TABLET ORAL DAILY
Qty: 90 TABLET | Refills: 0 | Status: SHIPPED | OUTPATIENT
Start: 2024-04-30 | End: 2024-07-25

## 2024-05-25 ENCOUNTER — HEALTH MAINTENANCE LETTER (OUTPATIENT)
Age: 44
End: 2024-05-25

## 2024-07-24 DIAGNOSIS — M51.379 DEGENERATION OF LUMBAR OR LUMBOSACRAL INTERVERTEBRAL DISC: ICD-10-CM

## 2024-07-24 DIAGNOSIS — I10 HTN, GOAL BELOW 140/80: ICD-10-CM

## 2024-07-24 DIAGNOSIS — E78.5 HYPERLIPIDEMIA LDL GOAL <130: ICD-10-CM

## 2024-07-25 RX ORDER — ATORVASTATIN CALCIUM 10 MG/1
10 TABLET, FILM COATED ORAL DAILY
Qty: 90 TABLET | Refills: 0 | Status: SHIPPED | OUTPATIENT
Start: 2024-07-25

## 2024-07-25 RX ORDER — ATORVASTATIN CALCIUM 10 MG/1
10 TABLET, FILM COATED ORAL DAILY
Qty: 90 TABLET | Refills: 0 | OUTPATIENT
Start: 2024-07-25

## 2024-07-25 RX ORDER — LOSARTAN POTASSIUM AND HYDROCHLOROTHIAZIDE 12.5; 5 MG/1; MG/1
1 TABLET ORAL DAILY
Qty: 90 TABLET | Refills: 0 | OUTPATIENT
Start: 2024-07-25

## 2024-07-25 RX ORDER — MELOXICAM 15 MG/1
TABLET ORAL
Qty: 90 TABLET | Refills: 1 | OUTPATIENT
Start: 2024-07-25

## 2024-07-25 RX ORDER — TAMSULOSIN HYDROCHLORIDE 0.4 MG/1
0.4 CAPSULE ORAL DAILY
Qty: 90 CAPSULE | Refills: 0 | OUTPATIENT
Start: 2024-07-25

## 2024-07-25 RX ORDER — LOSARTAN POTASSIUM AND HYDROCHLOROTHIAZIDE 12.5; 5 MG/1; MG/1
1 TABLET ORAL DAILY
Qty: 90 TABLET | Refills: 0 | Status: SHIPPED | OUTPATIENT
Start: 2024-07-25

## 2024-07-25 RX ORDER — TAMSULOSIN HYDROCHLORIDE 0.4 MG/1
0.4 CAPSULE ORAL DAILY
Qty: 90 CAPSULE | Refills: 0 | Status: SHIPPED | OUTPATIENT
Start: 2024-07-25

## 2024-07-25 RX ORDER — MELOXICAM 15 MG/1
15 TABLET ORAL DAILY
Qty: 90 TABLET | Refills: 0 | Status: SHIPPED | OUTPATIENT
Start: 2024-07-25

## 2024-07-25 NOTE — TELEPHONE ENCOUNTER
Reason for call:  Other   Patient called regarding (reason for call): call back  Additional comments: patient made appointment for medication refills on your first available which is not until 8/22  He will need refill until then and he gets 90 days from [facility] mail service    Phone number to reach patient:  Home number on file [phone] (home)    Best Time:  any    Can we leave a detailed message on this number?  No - but he has mychart    Travel screening: Not Applicable

## 2024-08-20 ASSESSMENT — ASTHMA QUESTIONNAIRES
QUESTION_3 LAST FOUR WEEKS HOW OFTEN DID YOUR ASTHMA SYMPTOMS (WHEEZING, COUGHING, SHORTNESS OF BREATH, CHEST TIGHTNESS OR PAIN) WAKE YOU UP AT NIGHT OR EARLIER THAN USUAL IN THE MORNING: NOT AT ALL
ACT_TOTALSCORE: 25
QUESTION_1 LAST FOUR WEEKS HOW MUCH OF THE TIME DID YOUR ASTHMA KEEP YOU FROM GETTING AS MUCH DONE AT WORK, SCHOOL OR AT HOME: NONE OF THE TIME
QUESTION_4 LAST FOUR WEEKS HOW OFTEN HAVE YOU USED YOUR RESCUE INHALER OR NEBULIZER MEDICATION (SUCH AS ALBUTEROL): NOT AT ALL
ACT_TOTALSCORE: 25
QUESTION_2 LAST FOUR WEEKS HOW OFTEN HAVE YOU HAD SHORTNESS OF BREATH: NOT AT ALL
QUESTION_5 LAST FOUR WEEKS HOW WOULD YOU RATE YOUR ASTHMA CONTROL: COMPLETELY CONTROLLED

## 2024-08-22 ENCOUNTER — MYC MEDICAL ADVICE (OUTPATIENT)
Dept: FAMILY MEDICINE | Facility: OTHER | Age: 44
End: 2024-08-22

## 2024-08-22 ENCOUNTER — OFFICE VISIT (OUTPATIENT)
Dept: FAMILY MEDICINE | Facility: OTHER | Age: 44
End: 2024-08-22
Payer: COMMERCIAL

## 2024-08-22 VITALS
DIASTOLIC BLOOD PRESSURE: 80 MMHG | TEMPERATURE: 98.1 F | SYSTOLIC BLOOD PRESSURE: 128 MMHG | RESPIRATION RATE: 20 BRPM | HEART RATE: 108 BPM | OXYGEN SATURATION: 96 % | WEIGHT: 315 LBS | HEIGHT: 71 IN | BODY MASS INDEX: 44.1 KG/M2

## 2024-08-22 DIAGNOSIS — Z00.00 ROUTINE HISTORY AND PHYSICAL EXAMINATION OF ADULT: Primary | ICD-10-CM

## 2024-08-22 DIAGNOSIS — R07.89 ATYPICAL CHEST PAIN: ICD-10-CM

## 2024-08-22 DIAGNOSIS — E66.01 MORBID OBESITY (H): ICD-10-CM

## 2024-08-22 DIAGNOSIS — F41.1 GENERALIZED ANXIETY DISORDER: ICD-10-CM

## 2024-08-22 DIAGNOSIS — F32.1 MAJOR DEPRESSIVE DISORDER, SINGLE EPISODE, MODERATE (H): ICD-10-CM

## 2024-08-22 DIAGNOSIS — I47.10 SVT (SUPRAVENTRICULAR TACHYCARDIA) (H): ICD-10-CM

## 2024-08-22 DIAGNOSIS — K21.00 GASTROESOPHAGEAL REFLUX DISEASE WITH ESOPHAGITIS, UNSPECIFIED WHETHER HEMORRHAGE: ICD-10-CM

## 2024-08-22 DIAGNOSIS — G47.33 OSA (OBSTRUCTIVE SLEEP APNEA): ICD-10-CM

## 2024-08-22 DIAGNOSIS — I10 HTN, GOAL BELOW 140/80: ICD-10-CM

## 2024-08-22 DIAGNOSIS — J45.20 MILD INTERMITTENT ASTHMA WITHOUT COMPLICATION: ICD-10-CM

## 2024-08-22 DIAGNOSIS — E78.5 HYPERLIPIDEMIA LDL GOAL <130: ICD-10-CM

## 2024-08-22 LAB
ALBUMIN SERPL BCG-MCNC: 4.5 G/DL (ref 3.5–5.2)
ALP SERPL-CCNC: 123 U/L (ref 40–150)
ALT SERPL W P-5'-P-CCNC: 36 U/L (ref 0–70)
ANION GAP SERPL CALCULATED.3IONS-SCNC: 16 MMOL/L (ref 7–15)
AST SERPL W P-5'-P-CCNC: 31 U/L (ref 0–45)
BILIRUB SERPL-MCNC: 1.1 MG/DL
BUN SERPL-MCNC: 12 MG/DL (ref 6–20)
CALCIUM SERPL-MCNC: 9.4 MG/DL (ref 8.8–10.4)
CHLORIDE SERPL-SCNC: 100 MMOL/L (ref 98–107)
CHOLEST SERPL-MCNC: 169 MG/DL
CREAT SERPL-MCNC: 0.95 MG/DL (ref 0.67–1.17)
EGFRCR SERPLBLD CKD-EPI 2021: >90 ML/MIN/1.73M2
ERYTHROCYTE [DISTWIDTH] IN BLOOD BY AUTOMATED COUNT: 11.9 % (ref 10–15)
FASTING STATUS PATIENT QL REPORTED: NO
FASTING STATUS PATIENT QL REPORTED: NO
GLUCOSE SERPL-MCNC: 106 MG/DL (ref 70–99)
HCO3 SERPL-SCNC: 18 MMOL/L (ref 22–29)
HCT VFR BLD AUTO: 45.9 % (ref 40–53)
HDLC SERPL-MCNC: 37 MG/DL
HGB BLD-MCNC: 15.9 G/DL (ref 13.3–17.7)
LDLC SERPL CALC-MCNC: 106 MG/DL
MCH RBC QN AUTO: 30.9 PG (ref 26.5–33)
MCHC RBC AUTO-ENTMCNC: 34.6 G/DL (ref 31.5–36.5)
MCV RBC AUTO: 89 FL (ref 78–100)
NONHDLC SERPL-MCNC: 132 MG/DL
PLATELET # BLD AUTO: 239 10E3/UL (ref 150–450)
POTASSIUM SERPL-SCNC: 3.5 MMOL/L (ref 3.4–5.3)
PROT SERPL-MCNC: 8.1 G/DL (ref 6.4–8.3)
RBC # BLD AUTO: 5.15 10E6/UL (ref 4.4–5.9)
SODIUM SERPL-SCNC: 134 MMOL/L (ref 135–145)
TRIGL SERPL-MCNC: 130 MG/DL
TROPONIN T SERPL HS-MCNC: <6 NG/L
TSH SERPL DL<=0.005 MIU/L-ACNC: 1.99 UIU/ML (ref 0.3–4.2)
WBC # BLD AUTO: 7.2 10E3/UL (ref 4–11)

## 2024-08-22 PROCEDURE — 80061 LIPID PANEL: CPT | Performed by: PHYSICIAN ASSISTANT

## 2024-08-22 PROCEDURE — 36415 COLL VENOUS BLD VENIPUNCTURE: CPT | Performed by: PHYSICIAN ASSISTANT

## 2024-08-22 PROCEDURE — 85027 COMPLETE CBC AUTOMATED: CPT | Performed by: PHYSICIAN ASSISTANT

## 2024-08-22 PROCEDURE — 84484 ASSAY OF TROPONIN QUANT: CPT | Performed by: PHYSICIAN ASSISTANT

## 2024-08-22 PROCEDURE — 84443 ASSAY THYROID STIM HORMONE: CPT | Performed by: PHYSICIAN ASSISTANT

## 2024-08-22 PROCEDURE — 93000 ELECTROCARDIOGRAM COMPLETE: CPT | Performed by: PHYSICIAN ASSISTANT

## 2024-08-22 PROCEDURE — 80053 COMPREHEN METABOLIC PANEL: CPT | Performed by: PHYSICIAN ASSISTANT

## 2024-08-22 PROCEDURE — 99396 PREV VISIT EST AGE 40-64: CPT | Performed by: PHYSICIAN ASSISTANT

## 2024-08-22 PROCEDURE — 99214 OFFICE O/P EST MOD 30 MIN: CPT | Mod: 25 | Performed by: PHYSICIAN ASSISTANT

## 2024-08-22 ASSESSMENT — PATIENT HEALTH QUESTIONNAIRE - PHQ9
10. IF YOU CHECKED OFF ANY PROBLEMS, HOW DIFFICULT HAVE THESE PROBLEMS MADE IT FOR YOU TO DO YOUR WORK, TAKE CARE OF THINGS AT HOME, OR GET ALONG WITH OTHER PEOPLE: SOMEWHAT DIFFICULT
SUM OF ALL RESPONSES TO PHQ QUESTIONS 1-9: 9
SUM OF ALL RESPONSES TO PHQ QUESTIONS 1-9: 9

## 2024-08-22 ASSESSMENT — PAIN SCALES - GENERAL: PAINLEVEL: NO PAIN (0)

## 2024-08-23 ENCOUNTER — PATIENT OUTREACH (OUTPATIENT)
Dept: GASTROENTEROLOGY | Facility: CLINIC | Age: 44
End: 2024-08-23
Payer: COMMERCIAL

## 2024-08-26 ENCOUNTER — HOSPITAL ENCOUNTER (OUTPATIENT)
Dept: CARDIOLOGY | Facility: CLINIC | Age: 44
Discharge: HOME | End: 2024-08-26
Attending: PHYSICIAN ASSISTANT | Admitting: PHYSICIAN ASSISTANT
Payer: COMMERCIAL

## 2024-08-26 DIAGNOSIS — I47.10 SVT (SUPRAVENTRICULAR TACHYCARDIA) (H): ICD-10-CM

## 2024-08-26 DIAGNOSIS — R07.89 ATYPICAL CHEST PAIN: ICD-10-CM

## 2024-08-26 PROCEDURE — 93325 DOPPLER ECHO COLOR FLOW MAPG: CPT | Mod: 26 | Performed by: INTERNAL MEDICINE

## 2024-08-26 PROCEDURE — 93325 DOPPLER ECHO COLOR FLOW MAPG: CPT | Mod: TC

## 2024-08-26 PROCEDURE — 93016 CV STRESS TEST SUPVJ ONLY: CPT | Performed by: INTERNAL MEDICINE

## 2024-08-26 PROCEDURE — 93350 STRESS TTE ONLY: CPT | Mod: 26 | Performed by: INTERNAL MEDICINE

## 2024-08-26 PROCEDURE — 93018 CV STRESS TEST I&R ONLY: CPT | Performed by: INTERNAL MEDICINE

## 2024-08-26 PROCEDURE — 255N000002 HC RX 255 OP 636: Performed by: PHYSICIAN ASSISTANT

## 2024-08-26 PROCEDURE — 93321 DOPPLER ECHO F-UP/LMTD STD: CPT | Mod: 26 | Performed by: INTERNAL MEDICINE

## 2024-08-26 RX ADMIN — HUMAN ALBUMIN MICROSPHERES AND PERFLUTREN 2 ML: 10; .22 INJECTION, SOLUTION INTRAVENOUS at 11:30

## 2024-08-30 ENCOUNTER — MYC MEDICAL ADVICE (OUTPATIENT)
Dept: FAMILY MEDICINE | Facility: OTHER | Age: 44
End: 2024-08-30
Payer: COMMERCIAL

## 2024-09-03 NOTE — TELEPHONE ENCOUNTER
Hello,     We are showing we do not yet have that denial on file. We would need to have the denial on file sent to us so our insurance follow-up team can review. They should review once we get the denial. We would advise having the patient allow more time so we can get that on file. If the patient has further billing concerns please have him message billing directly by going to     Menu   Messages   Ask a Question   Ask a Customer Service Question   Billing question     Thank you,     [facility] Patient Financial Services Team.

## 2024-09-26 ENCOUNTER — OFFICE VISIT (OUTPATIENT)
Dept: FAMILY MEDICINE | Facility: OTHER | Age: 44
End: 2024-09-26
Payer: COMMERCIAL

## 2024-09-26 VITALS
HEIGHT: 70 IN | DIASTOLIC BLOOD PRESSURE: 86 MMHG | HEART RATE: 103 BPM | OXYGEN SATURATION: 97 % | TEMPERATURE: 98.7 F | WEIGHT: 315 LBS | RESPIRATION RATE: 19 BRPM | SYSTOLIC BLOOD PRESSURE: 132 MMHG | BODY MASS INDEX: 45.1 KG/M2

## 2024-09-26 DIAGNOSIS — E66.01 MORBID OBESITY (H): ICD-10-CM

## 2024-09-26 DIAGNOSIS — R07.89 ATYPICAL CHEST PAIN: Primary | ICD-10-CM

## 2024-09-26 DIAGNOSIS — Z23 NEED FOR PROPHYLACTIC VACCINATION AND INOCULATION AGAINST INFLUENZA: ICD-10-CM

## 2024-09-26 DIAGNOSIS — K21.00 GASTROESOPHAGEAL REFLUX DISEASE WITH ESOPHAGITIS, UNSPECIFIED WHETHER HEMORRHAGE: ICD-10-CM

## 2024-09-26 DIAGNOSIS — G44.209 TENSION HEADACHE: ICD-10-CM

## 2024-09-26 PROCEDURE — 90472 IMMUNIZATION ADMIN EACH ADD: CPT | Performed by: PHYSICIAN ASSISTANT

## 2024-09-26 PROCEDURE — 90656 IIV3 VACC NO PRSV 0.5 ML IM: CPT | Performed by: PHYSICIAN ASSISTANT

## 2024-09-26 PROCEDURE — 90715 TDAP VACCINE 7 YRS/> IM: CPT | Performed by: PHYSICIAN ASSISTANT

## 2024-09-26 PROCEDURE — 90471 IMMUNIZATION ADMIN: CPT | Performed by: PHYSICIAN ASSISTANT

## 2024-09-26 PROCEDURE — 99213 OFFICE O/P EST LOW 20 MIN: CPT | Mod: 25 | Performed by: PHYSICIAN ASSISTANT

## 2024-09-26 RX ORDER — TOPIRAMATE 50 MG/1
50 TABLET, FILM COATED ORAL 2 TIMES DAILY
Qty: 180 TABLET | Refills: 3 | Status: SHIPPED | OUTPATIENT
Start: 2024-09-26

## 2024-09-26 RX ORDER — SUCRALFATE 1 G/1
1 TABLET ORAL 4 TIMES DAILY
Qty: 40 TABLET | Refills: 0 | Status: SHIPPED | OUTPATIENT
Start: 2024-09-26 | End: 2024-09-26

## 2024-09-26 RX ORDER — SUCRALFATE 1 G/1
1 TABLET ORAL 4 TIMES DAILY
Qty: 40 TABLET | Refills: 0 | Status: SHIPPED | OUTPATIENT
Start: 2024-09-26

## 2024-09-26 ASSESSMENT — PAIN SCALES - GENERAL: PAINLEVEL: NO PAIN (0)

## 2024-09-26 NOTE — PROGRESS NOTES
Assessment & Plan   The longitudinal plan of care for the diagnosis(es)/condition(s) as documented were addressed during this visit. Due to the added complexity in care, I will continue to support Messi in the subsequent management and with ongoing continuity of care.     Atypical chest pain  Patient reports that he has not had any chest pain since his last visit with us.  This pain that was noted to the central chest with a slight heaviness to it has resolved.  He did have the echocardiogram done and this showed no cardiovascular concern.  Further evaluation related to gastroesophageal reflux disease is discussed with the patient at length as the next reason why he would have any atypical chest pain to the central chest with some heaviness.    Tension headache  Continue with current medications since it seems to be helping.  - topiramate (TOPAMAX) 50 MG tablet; Take 1 tablet (50 mg) by mouth 2 times daily.    Need for prophylactic vaccination and inoculation against influenza    Gastroesophageal reflux disease with esophagitis, unspecified whether hemorrhage  Morbid obesity (H)  Trial of alternative medication and he is to let us know how this goes.  Do recommend that he get into see gastroenterology for consideration of upper GI endoscopy.  Given the nature of his insurance would have GI dictate whether or not they want to do upper GI endoscopy etc.  - Adult GI  Referral - Consult Only; Future  - sucralfate (CARAFATE) 1 GM tablet; Take 1 tablet (1 g) by mouth 4 times daily.            Work on weight loss  Regular exercise    Subjective   Messi is a 43 year old, presenting for the following health issues:  RECHECK      9/26/2024     4:47 PM   Additional Questions   Roomed by sandra   Accompanied by self     History of Present Illness       Reason for visit:  Chest pain follow up    He eats 0-1 servings of fruits and vegetables daily.He consumes 1 sweetened beverage(s) daily.He exercises with enough effort to  "increase his heart rate 20 to 29 minutes per day.  He exercises with enough effort to increase his heart rate 3 or less days per week.   He is taking medications regularly.         Review of Systems  Constitutional, HEENT, cardiovascular, pulmonary, GI, , musculoskeletal, neuro, skin, endocrine and psych systems are negative, except as otherwise noted.      Objective    /86   Pulse 103   Temp 98.7  F (37.1  C) (Temporal)   Resp 19   Ht 1.789 m (5' 10.43\")   Wt (!) 161.5 kg (356 lb)   SpO2 97%   BMI 50.45 kg/m    Body mass index is 50.45 kg/m .  Physical Exam   GENERAL: alert and no distress  RESP: lungs clear to auscultation - no rales, rhonchi or wheezes  CV: regular rate and rhythm, normal S1 S2, no S3 or S4, no murmur, click or rub, no peripheral edema  MS: no gross musculoskeletal defects noted, no edema  NEURO: Normal strength and tone, mentation intact and speech normal  PSYCH: mentation appears normal, affect normal/bright    No results found for this or any previous visit (from the past 24 hour(s)).        Signed Electronically by: Alvin Avila PA-C    "

## 2024-09-26 NOTE — PROGRESS NOTES
Prior to immunization administration, verified patients identity using patient s name and date of birth. Please see Immunization Activity for additional information.     Screening Questionnaire for Adult Immunization    Are you sick today?   No   Do you have allergies to medications, food, a vaccine component or latex?   No   Have you ever had a serious reaction after receiving a vaccination?   No   Do you have a long-term health problem with heart, lung, kidney, or metabolic disease (e.g., diabetes), asthma, a blood disorder, no spleen, complement component deficiency, a cochlear implant, or a spinal fluid leak?  Are you on long-term aspirin therapy?   No   Do you have cancer, leukemia, HIV/AIDS, or any other immune system problem?   No   Do you have a parent, brother, or sister with an immune system problem?   No   In the past 3 months, have you taken medications that affect  your immune system, such as prednisone, other steroids, or anticancer drugs; drugs for the treatment of rheumatoid arthritis, Crohn s disease, or psoriasis; or have you had radiation treatments?   No   Have you had a seizure, or a brain or other nervous system problem?   No   During the past year, have you received a transfusion of blood or blood    products, or been given immune (gamma) globulin or antiviral drug?   No   For women: Are you pregnant or is there a chance you could become       pregnant during the next month?   No   Have you received any vaccinations in the past 4 weeks?   No     Immunization questionnaire answers were all negative.      Patient instructed to remain in clinic for 15 minutes afterwards, and to report any adverse reactions.     Screening performed by Barbara Wisdom CMA on 9/26/2024 at 5:22 PM.

## 2024-09-27 ASSESSMENT — PATIENT HEALTH QUESTIONNAIRE - PHQ9: SUM OF ALL RESPONSES TO PHQ QUESTIONS 1-9: 6

## 2024-11-01 DIAGNOSIS — I10 HTN, GOAL BELOW 140/80: ICD-10-CM

## 2024-11-01 DIAGNOSIS — M51.379 DEGENERATION OF LUMBAR OR LUMBOSACRAL INTERVERTEBRAL DISC: ICD-10-CM

## 2024-11-01 DIAGNOSIS — E78.5 HYPERLIPIDEMIA LDL GOAL <130: ICD-10-CM

## 2024-11-04 RX ORDER — TAMSULOSIN HYDROCHLORIDE 0.4 MG/1
0.4 CAPSULE ORAL DAILY
Qty: 90 CAPSULE | Refills: 2 | Status: SHIPPED | OUTPATIENT
Start: 2024-11-04

## 2024-11-04 RX ORDER — ATORVASTATIN CALCIUM 10 MG/1
10 TABLET, FILM COATED ORAL DAILY
Qty: 90 TABLET | Refills: 2 | Status: SHIPPED | OUTPATIENT
Start: 2024-11-04

## 2024-11-04 RX ORDER — MELOXICAM 15 MG/1
15 TABLET ORAL DAILY
Qty: 90 TABLET | Refills: 0 | Status: SHIPPED | OUTPATIENT
Start: 2024-11-04

## 2024-11-04 RX ORDER — LOSARTAN POTASSIUM AND HYDROCHLOROTHIAZIDE 12.5; 5 MG/1; MG/1
1 TABLET ORAL DAILY
Qty: 90 TABLET | Refills: 2 | Status: SHIPPED | OUTPATIENT
Start: 2024-11-04

## 2025-01-15 ENCOUNTER — MYC REFILL (OUTPATIENT)
Dept: FAMILY MEDICINE | Facility: OTHER | Age: 45
End: 2025-01-15
Payer: COMMERCIAL

## 2025-01-15 DIAGNOSIS — M51.379 DEGENERATION OF LUMBAR OR LUMBOSACRAL INTERVERTEBRAL DISC: ICD-10-CM

## 2025-01-15 DIAGNOSIS — F32.1 MAJOR DEPRESSIVE DISORDER, SINGLE EPISODE, MODERATE (H): ICD-10-CM

## 2025-01-15 DIAGNOSIS — K21.00 GASTROESOPHAGEAL REFLUX DISEASE WITH ESOPHAGITIS, UNSPECIFIED WHETHER HEMORRHAGE: ICD-10-CM

## 2025-01-16 RX ORDER — MELOXICAM 15 MG/1
15 TABLET ORAL DAILY
Qty: 90 TABLET | Refills: 0 | Status: SHIPPED | OUTPATIENT
Start: 2025-01-16

## 2025-01-16 RX ORDER — OMEPRAZOLE 40 MG/1
40 CAPSULE, DELAYED RELEASE ORAL DAILY
Qty: 90 CAPSULE | Refills: 3 | Status: SHIPPED | OUTPATIENT
Start: 2025-01-16

## 2025-01-16 RX ORDER — BUPROPION HYDROCHLORIDE 150 MG/1
150 TABLET ORAL EVERY MORNING
Qty: 90 TABLET | Refills: 3 | Status: SHIPPED | OUTPATIENT
Start: 2025-01-16

## 2025-06-02 ENCOUNTER — MYC REFILL (OUTPATIENT)
Dept: FAMILY MEDICINE | Facility: OTHER | Age: 45
End: 2025-06-02
Payer: COMMERCIAL

## 2025-06-02 DIAGNOSIS — M51.26 LUMBAR DISC HERNIATION: Primary | ICD-10-CM

## 2025-06-02 DIAGNOSIS — M51.379 DEGENERATION OF LUMBAR OR LUMBOSACRAL INTERVERTEBRAL DISC: ICD-10-CM

## 2025-06-02 RX ORDER — MELOXICAM 15 MG/1
15 TABLET ORAL DAILY
Qty: 90 TABLET | Refills: 0 | Status: SHIPPED | OUTPATIENT
Start: 2025-06-02

## 2025-07-23 ENCOUNTER — PATIENT OUTREACH (OUTPATIENT)
Dept: CARE COORDINATION | Facility: CLINIC | Age: 45
End: 2025-07-23
Payer: COMMERCIAL

## 2025-08-06 ENCOUNTER — PATIENT OUTREACH (OUTPATIENT)
Dept: CARE COORDINATION | Facility: CLINIC | Age: 45
End: 2025-08-06
Payer: COMMERCIAL

## (undated) DEVICE — GLOVE EXAM NITRILE LG

## (undated) DEVICE — ENDO TROCAR 05MM VERSAPORT BLADED W/FIX CANNULA 179094F

## (undated) DEVICE — ENDO CLIP CARTIRDGE K2 MED/LG 10 1112

## (undated) DEVICE — ENDO TROCAR 05/11MM VERSAPORT V2 179095PF

## (undated) DEVICE — KIT ENDO TURNOVER/PROCEDURE CARRY-ON 101822

## (undated) DEVICE — ESU HOOK TIP 5MM CONMED

## (undated) DEVICE — TUBING SUCTION 12"X1/4" N612

## (undated) DEVICE — ENDO TROCAR BLADELESS 5X150MM EXCEL B5XT

## (undated) DEVICE — GLOVE PROTEXIS W/NEU-THERA 7.5  2D73TE75

## (undated) DEVICE — SOL NACL 0.9% INJ 1000ML BAG 07983-09

## (undated) DEVICE — SOL WATER IRRIG 1000ML BOTTLE 07139-09

## (undated) DEVICE — STOCKING SLEEVE COMPRESSION CALF LG

## (undated) DEVICE — ENDO FIXATION CANNULA 05MM VERSAPORT 177092F

## (undated) DEVICE — PACK GENERAL LAPAOSCOPY

## (undated) DEVICE — ANTIFOG SOLUTION W/FOAM PAD 31142527

## (undated) DEVICE — ESU SUCTION/IRRIGATION SYSTEM PISTOL GRIP

## (undated) DEVICE — NDL INSUFFLATION 120MM VERRES 172015

## (undated) DEVICE — DRSG PRIMAPORE 03 1/8X6" 66000318

## (undated) DEVICE — ESU GROUND PAD UNIVERSAL W/O CORD

## (undated) DEVICE — STPL SKIN 35W APPOSE 8886803712

## (undated) DEVICE — LUBRICATING JELLY 4.25OZ

## (undated) DEVICE — DRSG BANDAID 2X3 1/2" FABRIC

## (undated) RX ORDER — FENTANYL CITRATE 50 UG/ML
INJECTION, SOLUTION INTRAMUSCULAR; INTRAVENOUS
Status: DISPENSED
Start: 2017-02-13

## (undated) RX ORDER — BUPIVACAINE HYDROCHLORIDE AND EPINEPHRINE 2.5; 5 MG/ML; UG/ML
INJECTION, SOLUTION EPIDURAL; INFILTRATION; INTRACAUDAL; PERINEURAL
Status: DISPENSED
Start: 2017-02-13

## (undated) RX ORDER — ETOMIDATE 2 MG/ML
INJECTION INTRAVENOUS
Status: DISPENSED
Start: 2022-03-06

## (undated) RX ORDER — DEXAMETHASONE SODIUM PHOSPHATE 10 MG/ML
INJECTION INTRAMUSCULAR; INTRAVENOUS
Status: DISPENSED
Start: 2017-02-13

## (undated) RX ORDER — ONDANSETRON 2 MG/ML
INJECTION INTRAMUSCULAR; INTRAVENOUS
Status: DISPENSED
Start: 2017-02-13

## (undated) RX ORDER — GLYCOPYRROLATE 0.2 MG/ML
INJECTION INTRAMUSCULAR; INTRAVENOUS
Status: DISPENSED
Start: 2017-02-13

## (undated) RX ORDER — LIDOCAINE HYDROCHLORIDE 10 MG/ML
INJECTION, SOLUTION EPIDURAL; INFILTRATION; INTRACAUDAL; PERINEURAL
Status: DISPENSED
Start: 2017-02-13

## (undated) RX ORDER — ALBUTEROL SULFATE 90 UG/1
AEROSOL, METERED RESPIRATORY (INHALATION)
Status: DISPENSED
Start: 2017-02-13